# Patient Record
Sex: MALE | Race: WHITE | NOT HISPANIC OR LATINO | Employment: FULL TIME | URBAN - NONMETROPOLITAN AREA
[De-identification: names, ages, dates, MRNs, and addresses within clinical notes are randomized per-mention and may not be internally consistent; named-entity substitution may affect disease eponyms.]

---

## 2020-12-10 DIAGNOSIS — U07.1 COVID-19: ICD-10-CM

## 2020-12-10 PROCEDURE — U0003 INFECTIOUS AGENT DETECTION BY NUCLEIC ACID (DNA OR RNA); SEVERE ACUTE RESPIRATORY SYNDROME CORONAVIRUS 2 (SARS-COV-2) (CORONAVIRUS DISEASE [COVID-19]), AMPLIFIED PROBE TECHNIQUE, MAKING USE OF HIGH THROUGHPUT TECHNOLOGIES AS DESCRIBED BY CMS-2020-01-R: HCPCS | Performed by: INTERNAL MEDICINE

## 2020-12-11 LAB — SARS-COV-2 RNA SPEC QL NAA+PROBE: DETECTED

## 2022-08-18 ENCOUNTER — TELEPHONE (OUTPATIENT)
Dept: NEPHROLOGY | Facility: CLINIC | Age: 62
End: 2022-08-18

## 2022-08-22 ENCOUNTER — TELEPHONE (OUTPATIENT)
Dept: CARDIOLOGY CLINIC | Facility: CLINIC | Age: 62
End: 2022-08-22

## 2022-08-24 ENCOUNTER — APPOINTMENT (EMERGENCY)
Dept: NON INVASIVE DIAGNOSTICS | Facility: HOSPITAL | Age: 62
End: 2022-08-24
Payer: COMMERCIAL

## 2022-08-24 ENCOUNTER — HOSPITAL ENCOUNTER (EMERGENCY)
Facility: HOSPITAL | Age: 62
Discharge: HOME | End: 2022-08-24
Attending: EMERGENCY MEDICINE
Payer: COMMERCIAL

## 2022-08-24 VITALS
HEART RATE: 56 BPM | BODY MASS INDEX: 34.97 KG/M2 | HEIGHT: 70 IN | DIASTOLIC BLOOD PRESSURE: 71 MMHG | TEMPERATURE: 98.1 F | OXYGEN SATURATION: 98 % | RESPIRATION RATE: 20 BRPM | WEIGHT: 244.27 LBS | SYSTOLIC BLOOD PRESSURE: 113 MMHG

## 2022-08-24 DIAGNOSIS — N18.9 CHRONIC RENAL INSUFFICIENCY: Primary | ICD-10-CM

## 2022-08-24 DIAGNOSIS — M79.604 RIGHT LEG PAIN: ICD-10-CM

## 2022-08-24 LAB
ALBUMIN SERPL BCP-MCNC: 3.6 G/DL (ref 3.5–5)
ALP SERPL-CCNC: 59 U/L (ref 46–116)
ALT SERPL W P-5'-P-CCNC: 38 U/L (ref 12–78)
ANION GAP SERPL CALCULATED.3IONS-SCNC: 7 MMOL/L (ref 4–13)
AST SERPL W P-5'-P-CCNC: 26 U/L (ref 5–45)
BASOPHILS # BLD AUTO: 0.02 THOUSANDS/ΜL (ref 0–0.1)
BASOPHILS NFR BLD AUTO: 0 % (ref 0–1)
BILIRUB SERPL-MCNC: 0.48 MG/DL (ref 0.2–1)
BUN SERPL-MCNC: 34 MG/DL (ref 5–25)
CALCIUM SERPL-MCNC: 8.9 MG/DL (ref 8.3–10.1)
CHLORIDE SERPL-SCNC: 106 MMOL/L (ref 96–108)
CO2 SERPL-SCNC: 26 MMOL/L (ref 21–32)
CREAT SERPL-MCNC: 2.41 MG/DL (ref 0.6–1.3)
D DIMER PPP FEU-MCNC: 0.45 UG/ML FEU
EOSINOPHIL # BLD AUTO: 0.31 THOUSAND/ΜL (ref 0–0.61)
EOSINOPHIL NFR BLD AUTO: 5 % (ref 0–6)
ERYTHROCYTE [DISTWIDTH] IN BLOOD BY AUTOMATED COUNT: 12.3 % (ref 11.6–15.1)
GFR SERPL CREATININE-BSD FRML MDRD: 27 ML/MIN/1.73SQ M
GLUCOSE SERPL-MCNC: 129 MG/DL (ref 65–140)
HCT VFR BLD AUTO: 34.1 % (ref 36.5–49.3)
HGB BLD-MCNC: 11.2 G/DL (ref 12–17)
IMM GRANULOCYTES # BLD AUTO: 0.02 THOUSAND/UL (ref 0–0.2)
IMM GRANULOCYTES NFR BLD AUTO: 0 % (ref 0–2)
LYMPHOCYTES # BLD AUTO: 2.38 THOUSANDS/ΜL (ref 0.6–4.47)
LYMPHOCYTES NFR BLD AUTO: 36 % (ref 14–44)
MCH RBC QN AUTO: 33.7 PG (ref 26.8–34.3)
MCHC RBC AUTO-ENTMCNC: 32.8 G/DL (ref 31.4–37.4)
MCV RBC AUTO: 103 FL (ref 82–98)
MONOCYTES # BLD AUTO: 0.55 THOUSAND/ΜL (ref 0.17–1.22)
MONOCYTES NFR BLD AUTO: 8 % (ref 4–12)
NEUTROPHILS # BLD AUTO: 3.43 THOUSANDS/ΜL (ref 1.85–7.62)
NEUTS SEG NFR BLD AUTO: 51 % (ref 43–75)
NRBC BLD AUTO-RTO: 0 /100 WBCS
PLATELET # BLD AUTO: 185 THOUSANDS/UL (ref 149–390)
PMV BLD AUTO: 9.9 FL (ref 8.9–12.7)
POTASSIUM SERPL-SCNC: 4.9 MMOL/L (ref 3.5–5.3)
PROT SERPL-MCNC: 7.6 G/DL (ref 6.4–8.4)
RBC # BLD AUTO: 3.32 MILLION/UL (ref 3.88–5.62)
SODIUM SERPL-SCNC: 139 MMOL/L (ref 135–147)
WBC # BLD AUTO: 6.71 THOUSAND/UL (ref 4.31–10.16)

## 2022-08-24 PROCEDURE — 85379 FIBRIN DEGRADATION QUANT: CPT | Performed by: EMERGENCY MEDICINE

## 2022-08-24 PROCEDURE — 99284 EMERGENCY DEPT VISIT MOD MDM: CPT | Performed by: EMERGENCY MEDICINE

## 2022-08-24 PROCEDURE — 85025 COMPLETE CBC W/AUTO DIFF WBC: CPT | Performed by: EMERGENCY MEDICINE

## 2022-08-24 PROCEDURE — 93971 EXTREMITY STUDY: CPT | Performed by: SURGERY

## 2022-08-24 PROCEDURE — 99284 EMERGENCY DEPT VISIT MOD MDM: CPT

## 2022-08-24 PROCEDURE — 80053 COMPREHEN METABOLIC PANEL: CPT | Performed by: EMERGENCY MEDICINE

## 2022-08-24 PROCEDURE — 36415 COLL VENOUS BLD VENIPUNCTURE: CPT | Performed by: EMERGENCY MEDICINE

## 2022-08-24 PROCEDURE — 93971 EXTREMITY STUDY: CPT

## 2022-08-24 RX ORDER — ALLOPURINOL 100 MG/1
TABLET ORAL
Status: ON HOLD | COMMUNITY

## 2022-08-24 RX ORDER — ASPIRIN 81 MG/1
TABLET ORAL
Status: ON HOLD | COMMUNITY

## 2022-08-24 RX ORDER — LEVOTHYROXINE SODIUM 0.15 MG/1
TABLET ORAL
Status: ON HOLD | COMMUNITY
Start: 2022-06-27

## 2022-08-24 RX ORDER — GLIPIZIDE 5 MG/1
TABLET, FILM COATED, EXTENDED RELEASE ORAL
Status: ON HOLD | COMMUNITY
Start: 2022-03-29

## 2022-08-24 RX ORDER — ATORVASTATIN CALCIUM 20 MG/1
TABLET, FILM COATED ORAL
Status: ON HOLD | COMMUNITY

## 2022-08-24 RX ORDER — LOSARTAN POTASSIUM 50 MG/1
50 TABLET ORAL DAILY
Status: ON HOLD | COMMUNITY

## 2022-08-24 RX ORDER — UBIDECARENONE 100 MG
CAPSULE ORAL
Status: ON HOLD | COMMUNITY

## 2022-08-24 RX ORDER — OMEPRAZOLE 20 MG/1
CAPSULE, DELAYED RELEASE ORAL
Status: ON HOLD | COMMUNITY

## 2022-08-24 RX ORDER — ATENOLOL 50 MG/1
TABLET ORAL
Status: ON HOLD | COMMUNITY
End: 2022-10-30

## 2022-08-24 NOTE — ED PROVIDER NOTES
History  Chief Complaint   Patient presents with    Leg Pain     Saturday started with right leg pain just above the knee radiating into right inner thigh, denies injury, denies SOB     68-year-old male presents emergency room chief complaint of right thigh pain  Patient reports that he started with right upper leg pain on Saturday  Was localized more towards the medial aspect of his right thigh near his knee but has migrated and radiated proximally to his mid thigh  Is tender to palpitation  Is warm to touch  No fevers or chills  Does not feel particularly swollen  No chest pain or shortness of breath  No history of DVT  No risk factors for DVT  Patient did have ankle surgery there many years ago patient has a history of hypertension, hyperlipidemia, and diabetes  Patient does suffer neuropathy  History provided by:  Patient and spouse  Leg Pain  Location:  Leg  Leg location:  R upper leg  Pain details:     Quality:  Aching, pressure and burning    Radiates to:  Does not radiate    Severity:  Moderate    Onset quality:  Gradual    Duration:  4 days    Timing:  Constant    Progression:  Waxing and waning  Chronicity:  New  Relieved by:  None tried  Worsened by:  Bearing weight  Ineffective treatments:  None tried  Associated symptoms: no back pain, no decreased ROM, no fatigue, no fever, no itching, no muscle weakness, no neck pain, no numbness, no stiffness, no swelling and no tingling        Prior to Admission Medications   Prescriptions Last Dose Informant Patient Reported?  Taking?   allopurinol (ZYLOPRIM) 100 mg tablet   Yes No   Sig: Take by mouth   aspirin (Aspir-Low) 81 mg EC tablet   Yes No   Sig: Take by mouth   atenolol (TENORMIN) 50 mg tablet   Yes No   Sig: Take by mouth   atorvastatin (LIPITOR) 20 mg tablet   Yes No   co-enzyme Q-10 100 mg capsule   Yes No   Sig: Take by mouth   glipiZIDE (GLUCOTROL XL) 5 mg 24 hr tablet   Yes Yes   Sig: TAKE 1 TABLET DAILY 30 MINUTES BEFORE A MEAL (REPLACES 2 5 MG)   levothyroxine 150 mcg tablet   Yes Yes   Sig: TAKE 1 TABLET DAILY AT LEAST 30 MINUTES PRIOR TO BREAKFAST OR OTHER MEDICATIONS (REPLACES 137 MCG)   losartan (Cozaar) 100 MG tablet   Yes No   Sig: Take by mouth   omeprazole (PriLOSEC) 20 mg delayed release capsule   Yes No      Facility-Administered Medications: None       Past Medical History:   Diagnosis Date    Chronic kidney failure, stage 2 (mild)     Diabetes mellitus (HCC)     GERD (gastroesophageal reflux disease)     Gout     Hypertension        Past Surgical History:   Procedure Laterality Date    ANKLE FRACTURE SURGERY Right     CHOLECYSTECTOMY         History reviewed  No pertinent family history  I have reviewed and agree with the history as documented  E-Cigarette/Vaping    E-Cigarette Use Never User      E-Cigarette/Vaping Substances     Social History     Tobacco Use    Smoking status: Former Smoker     Types: Cigarettes    Smokeless tobacco: Never Used   Vaping Use    Vaping Use: Never used   Substance Use Topics    Alcohol use: Not Currently    Drug use: Never       Review of Systems   Constitutional: Negative for activity change, fatigue and fever  HENT: Negative for congestion, ear pain, rhinorrhea, sinus pressure and sore throat  Eyes: Negative  Respiratory: Negative  Negative for cough, chest tightness, shortness of breath and wheezing  Cardiovascular: Negative  Negative for chest pain, palpitations and leg swelling  Gastrointestinal: Negative  Negative for abdominal pain, diarrhea, nausea and vomiting  Endocrine: Negative  Genitourinary: Negative  Negative for dysuria, flank pain and frequency  Musculoskeletal: Positive for myalgias  Negative for arthralgias, back pain, gait problem, joint swelling, neck pain, neck stiffness and stiffness  Skin: Negative  Negative for color change, itching, pallor and rash  Allergic/Immunologic: Negative  Neurological: Negative    Negative for dizziness, weakness and headaches  Hematological: Negative  Psychiatric/Behavioral: Negative  All other systems reviewed and are negative  Physical Exam  Physical Exam  Vitals and nursing note reviewed  Constitutional:       General: He is not in acute distress  Appearance: Normal appearance  He is well-developed  He is obese  He is not ill-appearing or toxic-appearing  HENT:      Head: Normocephalic and atraumatic  Hair is normal       Jaw: No pain on movement  Right Ear: External ear normal       Left Ear: External ear normal       Nose: Nose normal       Mouth/Throat:      Mouth: Mucous membranes are moist       Pharynx: Oropharynx is clear  Eyes:      General: Lids are normal  No scleral icterus  Extraocular Movements: Extraocular movements intact  Conjunctiva/sclera: Conjunctivae normal       Pupils: Pupils are equal, round, and reactive to light  Cardiovascular:      Rate and Rhythm: Normal rate and regular rhythm  Heart sounds: Normal heart sounds  No murmur heard  Pulmonary:      Effort: Pulmonary effort is normal  No respiratory distress  Breath sounds: Normal breath sounds  No decreased breath sounds, wheezing, rhonchi or rales  Abdominal:      General: Abdomen is flat  There is no distension  Palpations: Abdomen is soft  Abdomen is not rigid  Tenderness: There is no abdominal tenderness  There is no right CVA tenderness or left CVA tenderness  Musculoskeletal:         General: Tenderness present  No deformity or signs of injury  Normal range of motion  Cervical back: Normal range of motion and neck supple  Thoracic back: Normal       Lumbar back: Normal       Right hip: Normal       Right upper leg: Tenderness present  No swelling, edema, deformity, lacerations or bony tenderness  Right knee: Normal       Right lower leg: Normal    Skin:     General: Skin is warm and dry  Coloration: Skin is not pale        Findings: No rash    Neurological:      General: No focal deficit present  Mental Status: He is alert and oriented to person, place, and time  Mental status is at baseline  Psychiatric:         Attention and Perception: Attention normal          Mood and Affect: Mood normal          Speech: Speech normal          Behavior: Behavior normal          Thought Content: Thought content normal          Judgment: Judgment normal          Vital Signs  ED Triage Vitals [08/24/22 0755]   Temperature Pulse Respirations Blood Pressure SpO2   98 1 °F (36 7 °C) 63 17 154/72 98 %      Temp Source Heart Rate Source Patient Position - Orthostatic VS BP Location FiO2 (%)   Temporal Monitor Lying Right arm --      Pain Score       5           Vitals:    08/24/22 0755 08/24/22 0900 08/24/22 0945 08/24/22 1015   BP: 154/72 122/78 122/76 113/71   Pulse: 63 60 55 56   Patient Position - Orthostatic VS: Lying Lying Lying Lying         Visual Acuity      ED Medications  Medications - No data to display    Diagnostic Studies  Results Reviewed     Procedure Component Value Units Date/Time    D-Dimer [[id number]]  (Normal) Collected: 08/24/22 0816    Lab Status: Final result Specimen: Blood from Arm, Left Updated: 08/24/22 0944     D-Dimer, Quant 0 45 ug/ml FEU     Narrative: In the evaluation for possible pulmonary embolism, in the appropriate (Well's Score of 4 or less) patient, the age adjusted d-dimer cutoff for this patient can be calculated as:    Age x 0 01 (in ug/mL) for Age-adjusted D-dimer exclusion threshold for a patient over 50 years      Comprehensive metabolic panel [[id number]]  (Abnormal) Collected: 08/24/22 0816    Lab Status: Final result Specimen: Blood from Arm, Left Updated: 08/24/22 0840     Sodium 139 mmol/L      Potassium 4 9 mmol/L      Chloride 106 mmol/L      CO2 26 mmol/L      ANION GAP 7 mmol/L      BUN 34 mg/dL      Creatinine 2 41 mg/dL      Glucose 129 mg/dL      Calcium 8 9 mg/dL      AST 26 U/L      ALT 38 U/L Alkaline Phosphatase 59 U/L      Total Protein 7 6 g/dL      Albumin 3 6 g/dL      Total Bilirubin 0 48 mg/dL      eGFR 27 ml/min/1 73sq m     Narrative:      National Kidney Disease Foundation guidelines for Chronic Kidney Disease (CKD):     Stage 1 with normal or high GFR (GFR > 90 mL/min/1 73 square meters)    Stage 2 Mild CKD (GFR = 60-89 mL/min/1 73 square meters)    Stage 3A Moderate CKD (GFR = 45-59 mL/min/1 73 square meters)    Stage 3B Moderate CKD (GFR = 30-44 mL/min/1 73 square meters)    Stage 4 Severe CKD (GFR = 15-29 mL/min/1 73 square meters)    Stage 5 End Stage CKD (GFR <15 mL/min/1 73 square meters)  Note: GFR calculation is accurate only with a steady state creatinine    CBC and differential [[id number]]  (Abnormal) Collected: 08/24/22 0816    Lab Status: Final result Specimen: Blood from Arm, Left Updated: 08/24/22 0821     WBC 6 71 Thousand/uL      RBC 3 32 Million/uL      Hemoglobin 11 2 g/dL      Hematocrit 34 1 %       fL      MCH 33 7 pg      MCHC 32 8 g/dL      RDW 12 3 %      MPV 9 9 fL      Platelets 667 Thousands/uL      nRBC 0 /100 WBCs      Neutrophils Relative 51 %      Immat GRANS % 0 %      Lymphocytes Relative 36 %      Monocytes Relative 8 %      Eosinophils Relative 5 %      Basophils Relative 0 %      Neutrophils Absolute 3 43 Thousands/µL      Immature Grans Absolute 0 02 Thousand/uL      Lymphocytes Absolute 2 38 Thousands/µL      Monocytes Absolute 0 55 Thousand/µL      Eosinophils Absolute 0 31 Thousand/µL      Basophils Absolute 0 02 Thousands/µL                  VAS lower limb venous duplex study, unilateral/limited    (Results Pending)              Procedures  Procedures         ED Course  ED Course as of 08/24/22 1331   Wed Aug 24, 2022   7461 No DVT noted     1019 D-Dimer, Quant: 0 45                               SBIRT 22yo+    Flowsheet Row Most Recent Value   SBIRT (25 yo +)    In order to provide better care to our patients, we are screening all of our patients for alcohol and drug use  Would it be okay to ask you these screening questions? Yes Filed at: 08/24/2022 1873   Initial Alcohol Screen: US AUDIT-C     1  How often do you have a drink containing alcohol? 0 Filed at: 08/24/2022 0802   2  How many drinks containing alcohol do you have on a typical day you are drinking? 0 Filed at: 08/24/2022 0802   3a  Male UNDER 65: How often do you have five or more drinks on one occasion? 0 Filed at: 08/24/2022 0802   3b  FEMALE Any Age, or MALE 65+: How often do you have 4 or more drinks on one occassion? 0 Filed at: 08/24/2022 0802   Audit-C Score 0 Filed at: 08/24/2022 1768   NICOLE: How many times in the past year have you    Used an illegal drug or used a prescription medication for non-medical reasons? Never Filed at: 08/24/2022 0802                    MDM  Number of Diagnoses or Management Options  Chronic renal insufficiency: new and requires workup  Right leg pain: new and requires workup  Diagnosis management comments: No evidence of DVT  No evidence of infection  No clinical evidence arterial compromise  Consider superficial thrombophlebitis  Also consider muscle strain  No further emergent intervention required  Patient is aware of his chronic renal insufficiency         Amount and/or Complexity of Data Reviewed  Clinical lab tests: ordered and reviewed  Tests in the radiology section of CPT®: ordered    Risk of Complications, Morbidity, and/or Mortality  Presenting problems: high  Diagnostic procedures: moderate  Management options: moderate    Patient Progress  Patient progress: improved      Disposition  Final diagnoses:   Right leg pain   Chronic renal insufficiency     Time reflects when diagnosis was documented in both MDM as applicable and the Disposition within this note     Time User Action Codes Description Comment    8/24/2022  8:42 AM Manju Rainey Add [M79 604] Right leg pain     8/24/2022 10:19 AM Alysteve May Modify [N72 708] Right leg pain     8/24/2022 10:19 AM Chandu Ramirez [N18 9] Chronic renal insufficiency       ED Disposition     ED Disposition   Discharge    Condition   Stable    Date/Time   Wed Aug 24, 2022 10:19 AM    Comment   Melva Barba discharge to home/self care  Follow-up Information     Follow up With Specialties Details Why Contact Info    Dimitris De Luna MD Family Medicine In 1 week As needed [address]  [phone]            Discharge Medication List as of 8/24/2022 10:20 AM      CONTINUE these medications which have NOT CHANGED    Details   glipiZIDE (GLUCOTROL XL) 5 mg 24 hr tablet TAKE 1 TABLET DAILY 30 MINUTES BEFORE A MEAL (REPLACES 2 5 MG), Historical Med      levothyroxine 150 mcg tablet TAKE 1 TABLET DAILY AT LEAST 30 MINUTES PRIOR TO BREAKFAST OR OTHER MEDICATIONS (REPLACES 137 MCG), Historical Med      allopurinol (ZYLOPRIM) 100 mg tablet Take by mouth, Historical Med      aspirin (Aspir-Low) 81 mg EC tablet Take by mouth, Historical Med      atenolol (TENORMIN) 50 mg tablet Take by mouth, Historical Med      atorvastatin (LIPITOR) 20 mg tablet Historical Med      co-enzyme Q-10 100 mg capsule Take by mouth, Historical Med      losartan (Cozaar) 100 MG tablet Take by mouth, Historical Med      omeprazole (PriLOSEC) 20 mg delayed release capsule Historical Med             No discharge procedures on file      PDMP Review     None          ED Provider  Electronically Signed by           Suhail Hampton DO  08/24/22 6292

## 2022-08-24 NOTE — DISCHARGE INSTRUCTIONS
Please follow-up with your primary care doctor  Use Tylenol for pain  You also may use warm compresses to the area 3 to 4 times a day for 15 minutes at a time  Return with any worsening  Thank you for choosing the emergency department at [facility]  We appreciated the opportunity and privilege to address your healthcare needs  We remain available to you should you require additional evaluation or assistance  We value your feedback and would appreciate the opportunity to address anything you identified as an opportunity to improve or where we excelled  If there are colleagues who deserve special recognition, please let us know! We hope you are feeling better soon! Please also note that sometimes there are subtle abnormalities in your lab values that you may observe when you access your record online  These are frequently not worrisome and if they are of concern we will have discussed them with you  However, we always encourage that you discuss any concerns you may have or observe on your record with your primary care provider  Please also be aware that voice transcription will occasionally recognize words or grammar differently than what was spoken

## 2022-09-29 ENCOUNTER — HOSPITAL ENCOUNTER (OUTPATIENT)
Dept: RADIOLOGY | Facility: HOSPITAL | Age: 62
End: 2022-09-29
Payer: COMMERCIAL

## 2022-09-29 DIAGNOSIS — E11.69 DIABETIC FOOT ULCER WITH OSTEOMYELITIS (HCC): ICD-10-CM

## 2022-09-29 DIAGNOSIS — L97.411 ULCER OF RIGHT HEEL, LIMITED TO BREAKDOWN OF SKIN (HCC): ICD-10-CM

## 2022-09-29 DIAGNOSIS — M86.9 DIABETIC FOOT ULCER WITH OSTEOMYELITIS (HCC): ICD-10-CM

## 2022-09-29 DIAGNOSIS — L97.509 DIABETIC FOOT ULCER WITH OSTEOMYELITIS (HCC): ICD-10-CM

## 2022-09-29 DIAGNOSIS — E11.621 DIABETIC FOOT ULCER WITH OSTEOMYELITIS (HCC): ICD-10-CM

## 2022-09-29 DIAGNOSIS — N50.811 RIGHT TESTICULAR PAIN: ICD-10-CM

## 2022-09-29 PROCEDURE — 73630 X-RAY EXAM OF FOOT: CPT

## 2022-10-06 ENCOUNTER — HOSPITAL ENCOUNTER (OUTPATIENT)
Dept: ULTRASOUND IMAGING | Facility: HOSPITAL | Age: 62
End: 2022-10-06
Payer: COMMERCIAL

## 2022-10-06 DIAGNOSIS — N50.811 RIGHT TESTICULAR PAIN: ICD-10-CM

## 2022-10-06 PROCEDURE — 76870 US EXAM SCROTUM: CPT

## 2022-10-13 ENCOUNTER — HOSPITAL ENCOUNTER (OUTPATIENT)
Dept: NON INVASIVE DIAGNOSTICS | Facility: HOSPITAL | Age: 62
Discharge: HOME | End: 2022-10-13
Payer: COMMERCIAL

## 2022-10-13 DIAGNOSIS — E08.621 DIABETIC FOOT ULCER ASSOCIATED WITH DIABETES MELLITUS DUE TO UNDERLYING CONDITION, UNSPECIFIED LATERALITY, UNSPECIFIED PART OF FOOT, UNSPECIFIED ULCER STAGE (HCC): ICD-10-CM

## 2022-10-13 DIAGNOSIS — L97.509 DIABETIC FOOT ULCER ASSOCIATED WITH DIABETES MELLITUS DUE TO UNDERLYING CONDITION, UNSPECIFIED LATERALITY, UNSPECIFIED PART OF FOOT, UNSPECIFIED ULCER STAGE (HCC): ICD-10-CM

## 2022-10-13 PROCEDURE — 93923 UPR/LXTR ART STDY 3+ LVLS: CPT

## 2022-10-13 PROCEDURE — 93925 LOWER EXTREMITY STUDY: CPT

## 2022-10-14 PROCEDURE — 93925 LOWER EXTREMITY STUDY: CPT | Performed by: SURGERY

## 2022-10-14 PROCEDURE — 93922 UPR/L XTREMITY ART 2 LEVELS: CPT | Performed by: SURGERY

## 2022-10-29 ENCOUNTER — APPOINTMENT (EMERGENCY)
Dept: RADIOLOGY | Facility: HOSPITAL | Age: 62
End: 2022-10-29

## 2022-10-29 ENCOUNTER — HOSPITAL ENCOUNTER (INPATIENT)
Facility: HOSPITAL | Age: 62
LOS: 5 days | Discharge: HOME | End: 2022-11-03
Attending: STUDENT IN AN ORGANIZED HEALTH CARE EDUCATION/TRAINING PROGRAM | Admitting: FAMILY MEDICINE

## 2022-10-29 DIAGNOSIS — A41.9 SEPSIS (HCC): ICD-10-CM

## 2022-10-29 DIAGNOSIS — E11.621 DIABETIC FOOT ULCER (HCC): Primary | ICD-10-CM

## 2022-10-29 DIAGNOSIS — N18.4 STAGE 4 CHRONIC KIDNEY DISEASE (HCC): ICD-10-CM

## 2022-10-29 DIAGNOSIS — E11.52 TYPE 2 DIABETES MELLITUS WITH DIABETIC PERIPHERAL ANGIOPATHY AND GANGRENE, WITHOUT LONG-TERM CURRENT USE OF INSULIN (HCC): ICD-10-CM

## 2022-10-29 DIAGNOSIS — L97.509 DIABETIC FOOT ULCER (HCC): Primary | ICD-10-CM

## 2022-10-29 DIAGNOSIS — M86.171 OSTEOMYELITIS OF FOOT, RIGHT, ACUTE (HCC): ICD-10-CM

## 2022-10-29 LAB
ALBUMIN SERPL BCP-MCNC: 3.3 G/DL (ref 3.5–5)
ALP SERPL-CCNC: 67 U/L (ref 46–116)
ALT SERPL W P-5'-P-CCNC: 30 U/L (ref 12–78)
ANION GAP SERPL CALCULATED.3IONS-SCNC: 10 MMOL/L (ref 4–13)
AST SERPL W P-5'-P-CCNC: 16 U/L (ref 5–45)
BASOPHILS # BLD AUTO: 0.02 THOUSANDS/ÂΜL (ref 0–0.1)
BASOPHILS NFR BLD AUTO: 0 % (ref 0–1)
BILIRUB SERPL-MCNC: 0.99 MG/DL (ref 0.2–1)
BUN SERPL-MCNC: 28 MG/DL (ref 5–25)
CALCIUM ALBUM COR SERPL-MCNC: 9.1 MG/DL (ref 8.3–10.1)
CALCIUM SERPL-MCNC: 8.5 MG/DL (ref 8.3–10.1)
CHLORIDE SERPL-SCNC: 101 MMOL/L (ref 96–108)
CO2 SERPL-SCNC: 25 MMOL/L (ref 21–32)
CREAT SERPL-MCNC: 2.42 MG/DL (ref 0.6–1.3)
CRP SERPL QL: 120.5 MG/L
EOSINOPHIL # BLD AUTO: 0.05 THOUSAND/ÂΜL (ref 0–0.61)
EOSINOPHIL NFR BLD AUTO: 0 % (ref 0–6)
ERYTHROCYTE [DISTWIDTH] IN BLOOD BY AUTOMATED COUNT: 12.2 % (ref 11.6–15.1)
ERYTHROCYTE [SEDIMENTATION RATE] IN BLOOD: 71 MM/HOUR (ref 0–19)
GFR SERPL CREATININE-BSD FRML MDRD: 27 ML/MIN/1.73SQ M
GLUCOSE SERPL-MCNC: 126 MG/DL (ref 65–140)
GLUCOSE SERPL-MCNC: 151 MG/DL (ref 65–140)
HCT VFR BLD AUTO: 33 % (ref 36.5–49.3)
HGB BLD-MCNC: 11.1 G/DL (ref 12–17)
IMM GRANULOCYTES # BLD AUTO: 0.06 THOUSAND/UL (ref 0–0.2)
IMM GRANULOCYTES NFR BLD AUTO: 0 % (ref 0–2)
LACTATE SERPL-SCNC: 1.2 MMOL/L (ref 0.5–2)
LYMPHOCYTES # BLD AUTO: 1.72 THOUSANDS/ÂΜL (ref 0.6–4.47)
LYMPHOCYTES NFR BLD AUTO: 12 % (ref 14–44)
MCH RBC QN AUTO: 33.6 PG (ref 26.8–34.3)
MCHC RBC AUTO-ENTMCNC: 33.6 G/DL (ref 31.4–37.4)
MCV RBC AUTO: 100 FL (ref 82–98)
MONOCYTES # BLD AUTO: 1.96 THOUSAND/ÂΜL (ref 0.17–1.22)
MONOCYTES NFR BLD AUTO: 13 % (ref 4–12)
NEUTROPHILS # BLD AUTO: 10.89 THOUSANDS/ÂΜL (ref 1.85–7.62)
NEUTS SEG NFR BLD AUTO: 75 % (ref 43–75)
NRBC BLD AUTO-RTO: 0 /100 WBCS
PLATELET # BLD AUTO: 241 THOUSANDS/UL (ref 149–390)
PMV BLD AUTO: 10.2 FL (ref 8.9–12.7)
POTASSIUM SERPL-SCNC: 4.6 MMOL/L (ref 3.5–5.3)
PROCALCITONIN SERPL-MCNC: 0.27 NG/ML
PROT SERPL-MCNC: 8 G/DL (ref 6.4–8.4)
RBC # BLD AUTO: 3.3 MILLION/UL (ref 3.88–5.62)
SODIUM SERPL-SCNC: 136 MMOL/L (ref 135–147)
WBC # BLD AUTO: 14.7 THOUSAND/UL (ref 4.31–10.16)

## 2022-10-29 RX ORDER — CEFEPIME HYDROCHLORIDE 2 G/50ML
2000 INJECTION, SOLUTION INTRAVENOUS ONCE
Status: COMPLETED | OUTPATIENT
Start: 2022-10-29 | End: 2022-10-29

## 2022-10-29 RX ORDER — HYDROCODONE BITARTRATE AND ACETAMINOPHEN 5; 325 MG/1; MG/1
1 TABLET ORAL EVERY 6 HOURS PRN
Status: DISCONTINUED | OUTPATIENT
Start: 2022-10-29 | End: 2022-11-03 | Stop reason: HOSPADM

## 2022-10-29 RX ORDER — ALLOPURINOL 100 MG/1
100 TABLET ORAL DAILY
Status: DISCONTINUED | OUTPATIENT
Start: 2022-10-30 | End: 2022-11-03 | Stop reason: HOSPADM

## 2022-10-29 RX ORDER — ASPIRIN 81 MG/1
81 TABLET ORAL DAILY
Status: DISCONTINUED | OUTPATIENT
Start: 2022-10-30 | End: 2022-11-03 | Stop reason: HOSPADM

## 2022-10-29 RX ORDER — METRONIDAZOLE 500 MG/100ML
500 INJECTION, SOLUTION INTRAVENOUS EVERY 8 HOURS
Status: DISCONTINUED | OUTPATIENT
Start: 2022-10-30 | End: 2022-11-02

## 2022-10-29 RX ORDER — ACETAMINOPHEN 325 MG/1
650 TABLET ORAL ONCE
Status: COMPLETED | OUTPATIENT
Start: 2022-10-29 | End: 2022-10-29

## 2022-10-29 RX ORDER — INSULIN LISPRO 100 [IU]/ML
1-6 INJECTION, SOLUTION INTRAVENOUS; SUBCUTANEOUS
Status: DISCONTINUED | OUTPATIENT
Start: 2022-10-29 | End: 2022-11-03 | Stop reason: HOSPADM

## 2022-10-29 RX ORDER — HEPARIN SODIUM 5000 [USP'U]/ML
5000 INJECTION, SOLUTION INTRAVENOUS; SUBCUTANEOUS EVERY 8 HOURS SCHEDULED
Status: DISCONTINUED | OUTPATIENT
Start: 2022-10-30 | End: 2022-11-01

## 2022-10-29 RX ORDER — INSULIN LISPRO 100 [IU]/ML
1-6 INJECTION, SOLUTION INTRAVENOUS; SUBCUTANEOUS
Status: DISCONTINUED | OUTPATIENT
Start: 2022-10-30 | End: 2022-11-03 | Stop reason: HOSPADM

## 2022-10-29 RX ORDER — PANTOPRAZOLE SODIUM 40 MG/1
40 TABLET, DELAYED RELEASE ORAL
Status: DISCONTINUED | OUTPATIENT
Start: 2022-10-30 | End: 2022-11-03 | Stop reason: HOSPADM

## 2022-10-29 RX ORDER — LEVOTHYROXINE SODIUM 0.15 MG/1
150 TABLET ORAL
Status: DISCONTINUED | OUTPATIENT
Start: 2022-10-30 | End: 2022-11-03 | Stop reason: HOSPADM

## 2022-10-29 RX ORDER — CEFAZOLIN SODIUM 2 G/50ML
2000 SOLUTION INTRAVENOUS EVERY 8 HOURS
Status: DISCONTINUED | OUTPATIENT
Start: 2022-10-30 | End: 2022-11-01

## 2022-10-29 RX ADMIN — ACETAMINOPHEN 650 MG: 325 TABLET ORAL at 20:39

## 2022-10-29 RX ADMIN — SODIUM CHLORIDE 1000 ML: 0.9 INJECTION, SOLUTION INTRAVENOUS at 22:22

## 2022-10-29 RX ADMIN — SODIUM CHLORIDE 1000 ML: 0.9 INJECTION, SOLUTION INTRAVENOUS at 21:27

## 2022-10-29 RX ADMIN — VANCOMYCIN HYDROCHLORIDE 1250 MG: 5 INJECTION, POWDER, LYOPHILIZED, FOR SOLUTION INTRAVENOUS at 22:11

## 2022-10-29 RX ADMIN — SODIUM CHLORIDE 1000 ML: 0.9 INJECTION, SOLUTION INTRAVENOUS at 22:52

## 2022-10-29 RX ADMIN — CEFEPIME HYDROCHLORIDE 2000 MG: 2 INJECTION, SOLUTION INTRAVENOUS at 21:28

## 2022-10-30 PROBLEM — N18.4 STAGE 4 CHRONIC KIDNEY DISEASE (HCC): Status: ACTIVE | Noted: 2022-10-30

## 2022-10-30 PROBLEM — N18.32 STAGE 3B CHRONIC KIDNEY DISEASE (HCC): Status: ACTIVE | Noted: 2022-10-30

## 2022-10-30 PROBLEM — D64.9 CHRONIC ANEMIA: Status: ACTIVE | Noted: 2022-10-30

## 2022-10-30 PROBLEM — M86.172 OSTEOMYELITIS OF FOOT, LEFT, ACUTE (HCC): Status: ACTIVE | Noted: 2022-10-30

## 2022-10-30 PROBLEM — E03.9 ACQUIRED HYPOTHYROIDISM: Status: ACTIVE | Noted: 2022-10-30

## 2022-10-30 PROBLEM — I10 ESSENTIAL HYPERTENSION: Status: ACTIVE | Noted: 2022-10-30

## 2022-10-30 PROBLEM — E11.59 TYPE 2 DIABETES MELLITUS WITH CIRCULATORY DISORDER, WITHOUT LONG-TERM CURRENT USE OF INSULIN (HCC): Status: ACTIVE | Noted: 2022-10-30

## 2022-10-30 PROBLEM — L03.115 CELLULITIS OF RIGHT FOOT: Status: ACTIVE | Noted: 2022-10-30

## 2022-10-30 PROBLEM — A41.9 SEPSIS (HCC): Status: ACTIVE | Noted: 2022-10-30

## 2022-10-30 PROBLEM — M86.171 OSTEOMYELITIS OF FOOT, RIGHT, ACUTE (HCC): Status: ACTIVE | Noted: 2022-10-30

## 2022-10-30 LAB
ANION GAP SERPL CALCULATED.3IONS-SCNC: 8 MMOL/L (ref 4–13)
BACTERIA UR QL AUTO: NORMAL /HPF
BASOPHILS # BLD AUTO: 0.02 THOUSANDS/ÂΜL (ref 0–0.1)
BASOPHILS NFR BLD AUTO: 0 % (ref 0–1)
BILIRUB UR QL STRIP: NEGATIVE
BUN SERPL-MCNC: 27 MG/DL (ref 5–25)
CALCIUM SERPL-MCNC: 8 MG/DL (ref 8.3–10.1)
CHLORIDE SERPL-SCNC: 106 MMOL/L (ref 96–108)
CLARITY UR: CLEAR
CO2 SERPL-SCNC: 25 MMOL/L (ref 21–32)
COLOR UR: YELLOW
CREAT SERPL-MCNC: 2.22 MG/DL (ref 0.6–1.3)
CREAT UR-MCNC: 87.2 MG/DL
CREAT UR-MCNC: 87.2 MG/DL
EOSINOPHIL # BLD AUTO: 0.08 THOUSAND/ÂΜL (ref 0–0.61)
EOSINOPHIL NFR BLD AUTO: 1 % (ref 0–6)
ERYTHROCYTE [DISTWIDTH] IN BLOOD BY AUTOMATED COUNT: 12.3 % (ref 11.6–15.1)
GFR SERPL CREATININE-BSD FRML MDRD: 30 ML/MIN/1.73SQ M
GLUCOSE SERPL-MCNC: 162 MG/DL (ref 65–140)
GLUCOSE SERPL-MCNC: 167 MG/DL (ref 65–140)
GLUCOSE SERPL-MCNC: 172 MG/DL (ref 65–140)
GLUCOSE SERPL-MCNC: 84 MG/DL (ref 65–140)
GLUCOSE SERPL-MCNC: 85 MG/DL (ref 65–140)
GLUCOSE UR STRIP-MCNC: NEGATIVE MG/DL
HCT VFR BLD AUTO: 29.4 % (ref 36.5–49.3)
HGB BLD-MCNC: 9.7 G/DL (ref 12–17)
HGB UR QL STRIP.AUTO: NEGATIVE
IMM GRANULOCYTES # BLD AUTO: 0.08 THOUSAND/UL (ref 0–0.2)
IMM GRANULOCYTES NFR BLD AUTO: 1 % (ref 0–2)
KETONES UR STRIP-MCNC: NEGATIVE MG/DL
LEUKOCYTE ESTERASE UR QL STRIP: NEGATIVE
LYMPHOCYTES # BLD AUTO: 2.08 THOUSANDS/ÂΜL (ref 0.6–4.47)
LYMPHOCYTES NFR BLD AUTO: 17 % (ref 14–44)
MAGNESIUM SERPL-MCNC: 1.6 MG/DL (ref 1.6–2.6)
MCH RBC QN AUTO: 34.4 PG (ref 26.8–34.3)
MCHC RBC AUTO-ENTMCNC: 33 G/DL (ref 31.4–37.4)
MCV RBC AUTO: 104 FL (ref 82–98)
MICROALBUMIN UR-MCNC: 99.9 MG/L (ref 0–20)
MICROALBUMIN/CREAT 24H UR: 115 MG/G CREATININE (ref 0–30)
MONOCYTES # BLD AUTO: 1.52 THOUSAND/ÂΜL (ref 0.17–1.22)
MONOCYTES NFR BLD AUTO: 13 % (ref 4–12)
NEUTROPHILS # BLD AUTO: 8.42 THOUSANDS/ÂΜL (ref 1.85–7.62)
NEUTS SEG NFR BLD AUTO: 68 % (ref 43–75)
NITRITE UR QL STRIP: NEGATIVE
NON-SQ EPI CELLS URNS QL MICRO: NORMAL /HPF
NRBC BLD AUTO-RTO: 0 /100 WBCS
PH UR STRIP.AUTO: 6 [PH]
PHOSPHATE SERPL-MCNC: 3.4 MG/DL (ref 2.3–4.1)
PLATELET # BLD AUTO: 173 THOUSANDS/UL (ref 149–390)
PMV BLD AUTO: 9.9 FL (ref 8.9–12.7)
POTASSIUM SERPL-SCNC: 4.5 MMOL/L (ref 3.5–5.3)
PROT UR STRIP-MCNC: NEGATIVE MG/DL
PROT UR-MCNC: 32 MG/DL
PROT/CREAT UR: 0.37 MG/G{CREAT} (ref 0–0.1)
RBC # BLD AUTO: 2.82 MILLION/UL (ref 3.88–5.62)
RBC #/AREA URNS AUTO: NORMAL /HPF
SODIUM SERPL-SCNC: 139 MMOL/L (ref 135–147)
SP GR UR STRIP.AUTO: 1.01 (ref 1–1.03)
TSH SERPL DL<=0.05 MIU/L-ACNC: 0.62 UIU/ML (ref 0.45–4.5)
UROBILINOGEN UR QL STRIP.AUTO: 0.2 E.U./DL
WBC # BLD AUTO: 12.2 THOUSAND/UL (ref 4.31–10.16)
WBC #/AREA URNS AUTO: NORMAL /HPF

## 2022-10-30 RX ORDER — HYDROMORPHONE HCL/PF 1 MG/ML
0.5 SYRINGE (ML) INJECTION ONCE
Status: COMPLETED | OUTPATIENT
Start: 2022-10-30 | End: 2022-10-30

## 2022-10-30 RX ORDER — METOPROLOL SUCCINATE 100 MG/1
100 TABLET, EXTENDED RELEASE ORAL DAILY
Status: DISCONTINUED | OUTPATIENT
Start: 2022-10-30 | End: 2022-11-03 | Stop reason: HOSPADM

## 2022-10-30 RX ORDER — METOPROLOL SUCCINATE 100 MG/1
100 TABLET, EXTENDED RELEASE ORAL DAILY
COMMUNITY

## 2022-10-30 RX ADMIN — INFLUENZA A VIRUS A/WISCONSIN/588/2019 (H1N1) RECOMBINANT HEMAGGLUTININ ANTIGEN, INFLUENZA A VIRUS A/DARWIN/6/2021 (H3N2) RECOMBINANT HEMAGGLUTININ ANTIGEN, INFLUENZA B VIRUS B/AUSTRIA/1359417/2021 RECOMBINANT HEMAGGLUTININ ANTIGEN, AND INFLUENZA B VIRUS B/PHUKET/3073/2013 RECOMBINANT HEMAGGLUTININ ANTIGEN 0.5 ML: 45; 45; 45; 45 INJECTION INTRAMUSCULAR at 16:38

## 2022-10-30 RX ADMIN — HYDROCODONE BITARTRATE AND ACETAMINOPHEN 1 TABLET: 5; 325 TABLET ORAL at 22:45

## 2022-10-30 RX ADMIN — LEVOTHYROXINE SODIUM 150 MCG: 150 TABLET ORAL at 05:38

## 2022-10-30 RX ADMIN — HYDROCODONE BITARTRATE AND ACETAMINOPHEN 1 TABLET: 5; 325 TABLET ORAL at 00:18

## 2022-10-30 RX ADMIN — METRONIDAZOLE 500 MG: 500 INJECTION, SOLUTION INTRAVENOUS at 00:21

## 2022-10-30 RX ADMIN — HYDROMORPHONE HYDROCHLORIDE 0.5 MG: 1 INJECTION, SOLUTION INTRAMUSCULAR; INTRAVENOUS; SUBCUTANEOUS at 06:13

## 2022-10-30 RX ADMIN — METRONIDAZOLE 500 MG: 500 INJECTION, SOLUTION INTRAVENOUS at 17:42

## 2022-10-30 RX ADMIN — HYDROCODONE BITARTRATE AND ACETAMINOPHEN 1 TABLET: 5; 325 TABLET ORAL at 09:10

## 2022-10-30 RX ADMIN — INSULIN LISPRO 1 UNITS: 100 INJECTION, SOLUTION INTRAVENOUS; SUBCUTANEOUS at 16:42

## 2022-10-30 RX ADMIN — CEFAZOLIN SODIUM 2000 MG: 2 SOLUTION INTRAVENOUS at 05:39

## 2022-10-30 RX ADMIN — CEFAZOLIN SODIUM 2000 MG: 2 SOLUTION INTRAVENOUS at 13:57

## 2022-10-30 RX ADMIN — ASPIRIN 81 MG: 81 TABLET, COATED ORAL at 09:10

## 2022-10-30 RX ADMIN — PANTOPRAZOLE SODIUM 40 MG: 40 TABLET, DELAYED RELEASE ORAL at 05:38

## 2022-10-30 RX ADMIN — HYDROCODONE BITARTRATE AND ACETAMINOPHEN 1 TABLET: 5; 325 TABLET ORAL at 16:38

## 2022-10-30 RX ADMIN — ALLOPURINOL 100 MG: 100 TABLET ORAL at 09:10

## 2022-10-30 RX ADMIN — VANCOMYCIN HYDROCHLORIDE 1750 MG: 1 INJECTION, POWDER, LYOPHILIZED, FOR SOLUTION INTRAVENOUS at 15:13

## 2022-10-30 RX ADMIN — METOPROLOL SUCCINATE 100 MG: 100 TABLET, EXTENDED RELEASE ORAL at 09:10

## 2022-10-30 RX ADMIN — CEFAZOLIN SODIUM 2000 MG: 2 SOLUTION INTRAVENOUS at 20:51

## 2022-10-30 RX ADMIN — INSULIN LISPRO 1 UNITS: 100 INJECTION, SOLUTION INTRAVENOUS; SUBCUTANEOUS at 12:34

## 2022-10-30 RX ADMIN — METRONIDAZOLE 500 MG: 500 INJECTION, SOLUTION INTRAVENOUS at 09:11

## 2022-10-30 RX ADMIN — HEPARIN SODIUM 5000 UNITS: 5000 INJECTION INTRAVENOUS; SUBCUTANEOUS at 20:51

## 2022-10-30 RX ADMIN — HEPARIN SODIUM 5000 UNITS: 5000 INJECTION INTRAVENOUS; SUBCUTANEOUS at 13:57

## 2022-10-30 NOTE — ASSESSMENT & PLAN NOTE
Lab Results   Component Value Date    EGFR 30 10/30/2022    EGFR 27 10/29/2022    EGFR 27 08/24/2022    CREATININE 2 22 (H) 10/30/2022    CREATININE 2 42 (H) 10/29/2022    CREATININE 2 41 (H) 08/24/2022     · Review of care everywhere, patient has CKD with creatinine > 2 since 2020  · Creatinine at baseline on admission- history CKD 4  · Referred to Nephrology by PCP but declined  · Follow nephrology recommends  · Trend creatinine  · Renally dose medications  · PTA losartan on hold  · Check urinalysis

## 2022-10-30 NOTE — UTILIZATION REVIEW
Initial Clinical Review    Admission: Date/Time/Statement:   Admission Orders (From admission, onward)     Ordered        10/29/22 2117  INPATIENT ADMISSION  Once                      Orders Placed This Encounter   Procedures   • INPATIENT ADMISSION     Standing Status:   Standing     Number of Occurrences:   1     Order Specific Question:   Level of Care     Answer:   Med Surg [16]     Order Specific Question:   Estimated length of stay     Answer:   More than 2 Midnights     Order Specific Question:   Certification     Answer:   I certify that inpatient services are medically necessary for this patient for a duration of greater than two midnights  See H&P and MD Progress Notes for additional information about the patient's course of treatment  ED Arrival Information     Expected   -    Arrival   10/29/2022 20:03    Acuity   Urgent            Means of arrival   Walk-In    Escorted by   Spouse    Service   Hospitalist    Admission type   Emergency            Arrival complaint   wound check           Chief Complaint   Patient presents with   • Fever - 9 weeks to 74 years     Pt reports he is receiving treatment for diabetic ulcer on R foot  Pr reports swelling in affected extremity, fever beginning yesterday        Initial Presentation: 57 y o  male to ED from home w/ developing fever, chills , R foot swelling w/ redness and pain on 10/28  In ED found to have suspected OM along w/ significant cellulitis w/ possible abscess of R foot  PMHX DM , CKD , HTN , R ankle ORIF recent right plantar aspect diabetic foot infection   Admitted IP status w/ suspected OM + leukocytosis and elevated inflammatory markers , lactic acid 1 2  Plan for podiatry eval , IV abx , wound cx pending , MRI pending   CKD Cr 2 42 , baseline cont to trend , check ua,losartan on hold, consult nephrology   BC pending   DM SSI and monitor   PE: mm dry , swelling tenderness and deformity , erythema                  Date: 10/30   Day 2: cont to have DC from foot infection   Feels feverish   Cr improved to 2 22 con to trend   Wound cx and MRI pending   Cont IV abx   ED Triage Vitals [10/29/22 2010]   Temperature Pulse Respirations Blood Pressure SpO2   (!) 102 4 °F (39 1 °C) 98 16 138/85 98 %      Temp Source Heart Rate Source Patient Position - Orthostatic VS BP Location FiO2 (%)   Oral -- Lying Right arm --      Pain Score       8          Wt Readings from Last 1 Encounters:   10/29/22 112 kg (247 lb 2 2 oz)     Additional Vital Signs:   10/30/22 0900 -- -- -- -- -- 96 % None (Room air) --   10/30/22 07:38:11 98 8 °F (37 1 °C) 72 16 141/70 94 95 % -- --   10/30/22 05:54:18 98 8 °F (37 1 °C) 75 -- -- -- 99 % -- --   10/29/22 2345 -- -- -- -- -- 95 % None (Room air) --   10/29/22 23:44:56 99 5 °F (37 5 °C) 87 18 127/66 86 95 % -- --   10/29/22 2315 -- 92 18 142/70 100 97 % -- --   10/29/22 2300 -- 91 -- 139/73 97 98 % -- --   10/29/22 2230 -- 93 -- 118/58 84 96 % -- --   10/29/22 2200 -- 91 18 142/69 99 97 % -- --   10/29/22 2130 99 5 °F (37 5 °C) 93 -- 126/60 86 96 %         Pertinent Labs/Diagnostic Test Results:   10/30 MRI   XR foot 2 views RIGHT   Final Result by Lamar Greer MD (10/30 8856)      No sign of osteomyelitis        Workstation performed: [id number]         MRI inpatient order    (Results Pending)     Results from last 7 days   Lab Units 10/30/22  0552 10/29/22  2032   WBC Thousand/uL 12 20* 14 70*   HEMOGLOBIN g/dL 9 7* 11 1*   HEMATOCRIT % 29 4* 33 0*   PLATELETS Thousands/uL 173 241   NEUTROS ABS Thousands/µL 8 42* 10 89*     Results from last 7 days   Lab Units 10/30/22  0552 10/29/22  2032   SODIUM mmol/L 139 136   POTASSIUM mmol/L 4 5 4 6   CHLORIDE mmol/L 106 101   CO2 mmol/L 25 25   ANION GAP mmol/L 8 10   BUN mg/dL 27* 28*   CREATININE mg/dL 2 22* 2 42*   EGFR ml/min/1 73sq m 30 27   CALCIUM mg/dL 8 0* 8 5   MAGNESIUM mg/dL 1 6  --    PHOSPHORUS mg/dL 3 4  --      Results from last 7 days   Lab Units 10/29/22  2032   AST U/L 16   ALT U/L 30   ALK PHOS U/L 67   TOTAL PROTEIN g/dL 8 0   ALBUMIN g/dL 3 3*   TOTAL BILIRUBIN mg/dL 0 99     Results from last 7 days   Lab Units 10/30/22  1112 10/30/22  0732 10/29/22  2343   POC GLUCOSE mg/dl 172* 85 126     Results from last 7 days   Lab Units 10/30/22  0552 10/29/22  2032   GLUCOSE RANDOM mg/dL 84 151*     Results from last 7 days   Lab Units 10/30/22  0552   TSH 3RD GENERATON uIU/mL 0 618     Results from last 7 days   Lab Units 10/29/22  2032   PROCALCITONIN ng/ml 0 27*     Results from last 7 days   Lab Units 10/29/22  2032   LACTIC ACID mmol/L 1 2     Results from last 7 days   Lab Units 10/29/22  2032   CRP mg/L 120 5*   SED RATE mm/hour 71*     Results from last 7 days   Lab Units 10/30/22  0909   CLARITY UA  Clear   COLOR UA  Yellow   SPEC GRAV UA  1 015   PH UA  6 0   GLUCOSE UA mg/dl Negative   KETONES UA mg/dl Negative   BLOOD UA  Negative   PROTEIN UA mg/dl Negative   NITRITE UA  Negative   BILIRUBIN UA  Negative   UROBILINOGEN UA E U /dl 0 2   LEUKOCYTES UA  Negative   WBC UA /hpf None Seen   RBC UA /hpf None Seen   BACTERIA UA /hpf None Seen   EPITHELIAL CELLS WET PREP /hpf None Seen       ED Treatment:   Medication Administration from 10/29/2022 2002 to 10/29/2022 2336       Date/Time Order Dose Route Action     10/29/2022 2039 acetaminophen (TYLENOL) tablet 650 mg 650 mg Oral Given     10/29/2022 2211 vancomycin (VANCOCIN) 1250 mg in sodium chloride 0 9% 250 mL IVPB 1,250 mg Intravenous New Bag     10/29/2022 2128 cefepime (MAXIPIME) IVPB (premix in dextrose) 2,000 mg 50 mL 2,000 mg Intravenous New Bag     10/29/2022 2127 sodium chloride 0 9 % bolus 1,000 mL 1,000 mL Intravenous New Bag     10/29/2022 2222 sodium chloride 0 9 % bolus 1,000 mL 1,000 mL Intravenous New Bag     10/29/2022 2252 sodium chloride 0 9 % bolus 1,000 mL 1,000 mL Intravenous New Bag        Past Medical History:   Diagnosis Date   • Chronic kidney failure, stage 2 (mild)    • Diabetes mellitus ([place] [address] )    • GERD (gastroesophageal reflux disease)    • Gout    • Hypertension      Present on Admission:  • Acquired hypothyroidism  • Stage 4 chronic kidney disease ([place] [address] )  • Essential hypertension  • Chronic anemia  • Type 2 diabetes mellitus with circulatory disorder, without long-term current use of insulin ([facility])      Admitting Diagnosis: Diabetic foot ulcer ([place] [address] ) [V59 784, L97 509]  Fever [R50 9]  Sepsis ([place] [address] ) [A41 9]  Age/Sex: 57 y o  male  Admission Orders:  Scheduled Medications:  allopurinol, 100 mg, Oral, Daily  aspirin, 81 mg, Oral, Daily  cefazolin, 2,000 mg, Intravenous, Q8H  heparin (porcine), 5,000 Units, Subcutaneous, Q8H TRAVIS  insulin lispro, 1-6 Units, Subcutaneous, TID AC  insulin lispro, 1-6 Units, Subcutaneous, HS  levothyroxine, 150 mcg, Oral, Early Morning  metoprolol succinate, 100 mg, Oral, Daily  metroNIDAZOLE, 500 mg, Intravenous, Q8H  pantoprazole, 40 mg, Oral, Early Morning  vancomycin, 20 mg/kg (Adjusted), Intravenous, Q24H  Iv dilaudid 0 5 mg IV x1     Continuous IV Infusions:     PRN Meds:  HYDROcodone-acetaminophen, 1 tablet, Oral, Q6H PRN  influenza vaccine, 0 5 mL, Intramuscular, Prior to discharge      Fingerstick ac and hs     IP CONSULT TO CASE MANAGEMENT  IP CONSULT TO PHARMACY  IP CONSULT TO PODIATRY  IP CONSULT TO NEPHROLOGY    Network Utilization Review Department  ATTENTION: Please call with any questions or concerns to [phone] and carefully listen to the prompts so that you are directed to the right person  All voicemails are confidential   Nadira Bold all requests for admission clinical reviews, approved or denied determinations and any other requests to dedicated fax number below belonging to the campus where the patient is receiving treatment   List of dedicated fax numbers for the Facilities:  [address] [place] [address] DENIALS (Administrative/Medical Necessity) [phone]   [address] (Maternity/NICU/Pediatrics) [phone]     [address] Carleen [place]ne [address] [phone]   [address] [place] [address] Al[place]  [facility] [address] [place] [address] 310 [facility] 134 365 [facility] [phone]

## 2022-10-30 NOTE — PROGRESS NOTES
Vancomycin Assessment    Gianna Rivera is a 57 y o  male who is currently receiving vancomycin 1250mg load in ED (15mg/kg ABW) for skin-soft tissue infection     Relevant clinical data and objective history reviewed:  Creatinine   Date Value Ref Range Status   10/29/2022 2 42 (H) 0 60 - 1 30 mg/dL Final     Comment:     Standardized to IDMS reference method   08/24/2022 2 41 (H) 0 60 - 1 30 mg/dL Final     Comment:     Standardized to IDMS reference method     /66   Pulse 87   Temp 99 5 °F (37 5 °C) (Oral)   Resp 18   Wt 109 kg (240 lb)   SpO2 95%   BMI 34 44 kg/m²   No intake/output data recorded  Lab Results   Component Value Date/Time    BUN 28 (H) 10/29/2022 08:32 PM    WBC 14 70 (H) 10/29/2022 08:32 PM    HGB 11 1 (L) 10/29/2022 08:32 PM    HCT 33 0 (L) 10/29/2022 08:32 PM     (H) 10/29/2022 08:32 PM     10/29/2022 08:32 PM     Temp Readings from Last 3 Encounters:   10/29/22 99 5 °F (37 5 °C) (Oral)   08/24/22 98 1 °F (36 7 °C) (Temporal)     Vancomycin Days of Therapy: 1    Assessment/Plan  The patient is currently on vancomycin utilizing scheduled dosing based on adjusted body weight (due to obesity)  Baseline risks associated with therapy include: dehydration  The patient is currently receiving 1250mg load in ED (15mg/kg ABW) and after clinical evaluation will be changed to 1750mg q24h (20mg/kg ABW), starting 17hr from first dose  [facility] will also follow closely for s/sx of nephrotoxicity, infusion reactions, and appropriateness of therapy  BMP and CBC will be ordered per protocol  Plan for trough as patient approaches steady state, prior to the 4th  dose at approximately 1430 on 11/1/22  Due to infection severity, will target a trough of 15-20 (appropriate for most indications)   Pharmacy will continue to follow the patient’s culture results and clinical progress daily      Nile Patel, Pharmacist

## 2022-10-30 NOTE — ASSESSMENT & PLAN NOTE
· Secondary to cellulitis of foot-suspected osteomyelitis  · Presents with leukocytosis, tachycardia, fever  · Lactic acid 1 2  · Right foot osteomyelitis  · Blood cultures pending  · Empiric antibiotic therapy  · Trend fever curve, leukocytosis

## 2022-10-30 NOTE — PROGRESS NOTES
[address]  Progress Note - Rosemary Fearing 1960, 57 y o  male MRN: [id number]  Unit/Bed#: [facility] Encounter: [id number]  Primary Care Provider: Alison Ruggiero MD   Date and time admitted to hospital: 10/29/2022  8:12 PM    Suspected osteomyelitis of foot, right, acute [facility])  Assessment & Plan  · Infected diabetic plantar ulcer  · Plantar wound present for 1 month and has been followed by [facility] outpatient  · Vascular duplex obtained bilaterally on 10/13/2022 without evidence of significant stenosis  · Completed ABX course of Keflex  · Developed acute pain, fever, chills, redness and swelling of right foot on 10/28  · Presents with significant edema/erythema of right foot, 2nd and 3rd digits with sausage like appearance  · Leukocytosis, elevated inflammatory markers  · X-ray right foot- bony erosion of distal 2nd digit  · Follow-up podiatry recommendation, continue IV antibiotic  · Wound culture pending  · MRI when available for further characterization and evaluation of possible abscess-as per patient, he does have plate and screw and right ankle from previous fracture    * Sepsis ([address] )  Assessment & Plan  · Secondary to cellulitis of foot-suspected osteomyelitis  · Presents with leukocytosis, tachycardia, fever  · Lactic acid 1 2  · Right foot osteomyelitis  · Blood cultures pending  · Empiric antibiotic therapy  · Trend fever curve, leukocytosis    Type 2 diabetes mellitus with circulatory disorder, without long-term current use of insulin [facility])  Assessment & Plan  Lab Results   Component Value Date    HGBA1C 6 5 (H) 08/17/2022       Recent Labs     10/29/22  2343 10/30/22  0732 10/30/22  1112   POCGLU 126 85 172*       Blood Sugar Average: Last 72 hrs:  (P) 931 [id number]     · Glipizide on hold with CKD 4  ·  sliding scale insulin coverage with Accu-Cheks  · Carbohydrate controlled diet  · Hypoglycemia protocol    Chronic anemia  Assessment & Plan  · Chronic anemia  · Hemoglobin 11 1 on admission appears baseline  · Probable secondary to CKD 4  · Monitor closely    Essential hypertension  Assessment & Plan  · Continue PTA metoprolol succinate 100 mg daily  · PTA losartan on hold due to CKD 4  · Trend blood pressures    Stage 4 chronic kidney disease [facility])  Assessment & Plan  Lab Results   Component Value Date    EGFR 30 10/30/2022    EGFR 27 10/29/2022    EGFR 27 08/24/2022    CREATININE 2 22 (H) 10/30/2022    CREATININE 2 42 (H) 10/29/2022    CREATININE 2 41 (H) 08/24/2022     · Review of care everywhere, patient has CKD with creatinine > 2 since 2020  · Creatinine at baseline on admission- history CKD 4  · Referred to Nephrology by PCP but declined  · Follow nephrology recommends  · Trend creatinine  · Renally dose medications  · PTA losartan on hold  · Check urinalysis    Acquired hypothyroidism  Assessment & Plan  · Continue PTA levothyroxine 150 mcg daily          VTE Pharmacologic Prophylaxis: VTE Score: 5 Moderate Risk (Score 3-4) - Pharmacological DVT Prophylaxis Ordered: heparin  Patient Centered Rounds: I performed bedside rounds with nursing staff today  Discussions with Specialists or Other Care Team Provider:  None    Education and Discussions with Family / Patient: Updated  (friend) via phone -left voice message    Time Spent for Care: 15 minutes  More than 50% of total time spent on counseling and coordination of care as described above  Current Length of Stay: 1 day(s)  Current Patient Status: Inpatient   Certification Statement: The patient will continue to require additional inpatient hospital stay due to To monitor above condition  Discharge Plan: Anticipate discharge in 48-72 hrs to To be determined    Code Status: Level 1 - Full Code    Subjective:   Seen and evaluated during the round  Resting comfortably  Feels feverish  Denies any nausea, vomiting    But reports he has this foot infection and recently has department but still having some discharge  Objective:     Vitals:   Temp (24hrs), Av 8 °F (37 7 °C), Min:98 8 °F (37 1 °C), Max:102 4 °F (39 1 °C)    Temp:  [98 8 °F (37 1 °C)-102 4 °F (39 1 °C)] 98 8 °F (37 1 °C)  HR:  [72-98] 72  Resp:  [16-18] 16  BP: (118-142)/(58-85) 141/70  SpO2:  [95 %-99 %] 96 %  Body mass index is 35 46 kg/m²  Input and Output Summary (last 24 hours): Intake/Output Summary (Last 24 hours) at 10/30/2022 1143  Last data filed at 10/30/2022 0910  Gross per 24 hour   Intake 2600 ml   Output 400 ml   Net 2200 ml       Physical Exam:   Physical Exam  Vitals and nursing note reviewed  Constitutional:       Appearance: Normal appearance  He is obese  He is not ill-appearing or diaphoretic  HENT:      Mouth/Throat:      Mouth: Mucous membranes are moist       Pharynx: Oropharynx is clear  No oropharyngeal exudate  Eyes:      General: No scleral icterus  Left eye: No discharge  Extraocular Movements: Extraocular movements intact  Conjunctiva/sclera: Conjunctivae normal       Pupils: Pupils are equal, round, and reactive to light  Neck:      Vascular: No carotid bruit  Cardiovascular:      Rate and Rhythm: Normal rate  Heart sounds: Normal heart sounds  No murmur heard  No friction rub  No gallop  Pulmonary:      Effort: Pulmonary effort is normal  No respiratory distress  Breath sounds: No stridor  No wheezing or rhonchi  Abdominal:      General: Abdomen is flat  Bowel sounds are normal  There is no distension  Palpations: There is no mass  Tenderness: There is no abdominal tenderness  Hernia: No hernia is present  Musculoskeletal:         General: Tenderness (Right foot) present  Cervical back: Normal range of motion  No rigidity or tenderness  Right lower leg: Edema present  Left lower leg: No edema        Comments: Right foot is covered with dry, clean, intact dressing   Lymphadenopathy:      Cervical: No cervical adenopathy  Skin:     General: Skin is warm  Capillary Refill: Capillary refill takes less than 2 seconds  Findings: Erythema present  No lesion  Neurological:      General: No focal deficit present  Mental Status: He is alert and oriented to person, place, and time  Cranial Nerves: No cranial nerve deficit  Sensory: No sensory deficit  Motor: No weakness  Psychiatric:         Mood and Affect: Mood normal          Additional Data:     Labs:  Results from last 7 days   Lab Units 10/30/22  0552   WBC Thousand/uL 12 20*   HEMOGLOBIN g/dL 9 7*   HEMATOCRIT % 29 4*   PLATELETS Thousands/uL 173   NEUTROS PCT % 68   LYMPHS PCT % 17   MONOS PCT % 13*   EOS PCT % 1     Results from last 7 days   Lab Units 10/30/22  0552 10/29/22  2032   SODIUM mmol/L 139 136   POTASSIUM mmol/L 4 5 4 6   CHLORIDE mmol/L 106 101   CO2 mmol/L 25 25   BUN mg/dL 27* 28*   CREATININE mg/dL 2 22* 2 42*   ANION GAP mmol/L 8 10   CALCIUM mg/dL 8 0* 8 5   ALBUMIN g/dL  --  3 3*   TOTAL BILIRUBIN mg/dL  --  0 99   ALK PHOS U/L  --  67   ALT U/L  --  30   AST U/L  --  16   GLUCOSE RANDOM mg/dL 84 151*         Results from last 7 days   Lab Units 10/30/22  1112 10/30/22  0732 10/29/22  2343   POC GLUCOSE mg/dl 172* 85 126         Results from last 7 days   Lab Units 10/29/22  2032   LACTIC ACID mmol/L 1 2   PROCALCITONIN ng/ml 0 27*       Lines/Drains:  Invasive Devices  Report    Peripheral Intravenous Line  Duration           Peripheral IV 08/24/22 Left Antecubital 67 days    Peripheral IV 10/29/22 Left Antecubital 1 day                      Imaging: No pertinent imaging reviewed      Recent Cultures (last 7 days):         Last 24 Hours Medication List:   Current Facility-Administered Medications   Medication Dose Route Frequency Provider Last Rate   • allopurinol  100 mg Oral Daily Yolanda S Sudhir, CRNP     • aspirin  81 mg Oral Daily Yolanda S Sudhir, CRNP     • cefazolin  2,000 mg Intravenous Q8H Yolanda S Sudhir, JUANYNP 2,000 mg (10/30/22 0539)   • heparin (porcine)  5,000 Units Subcutaneous Q8H [facility] Yolanda S Sudhir, CRNP     • HYDROcodone-acetaminophen  1 tablet Oral Q6H PRN Yolanda S Sudhir, CRNP     • influenza vaccine  0 5 mL Intramuscular Prior to discharge Sena Muñiz MD     • insulin lispro  1-6 Units Subcutaneous TID AC Yolanda S Sudhir, CRNP     • insulin lispro  1-6 Units Subcutaneous HS Yolanda S Sudhir, CRNP     • levothyroxine  150 mcg Oral Early Morning Yolanda S Sudhir, CRNP     • metoprolol succinate  100 mg Oral Daily Yolanda S Sudhir, CRNP     • metroNIDAZOLE  500 mg Intravenous Q8H Yolanda S Sudhir, CRNP 500 mg (10/30/22 0911)   • pantoprazole  40 mg Oral Early Morning Yolanda S Sudhir, CRNP     • vancomycin  20 mg/kg (Adjusted) Intravenous Q24H Yolanda S Sudhir, CRNP          Today, Patient Was Seen By: Sena Muñiz    **Please Note: This note may have been constructed using a voice recognition system  **

## 2022-10-30 NOTE — ASSESSMENT & PLAN NOTE
· Infected diabetic plantar ulcer  · Plantar wound present for 1 month and has been followed by [facility] Care outpatient  · Vascular duplex obtained bilaterally on 10/13/2022 without evidence of significant stenosis  · Completed ABX course of Keflex  · Developed acute pain, fever, chills, redness and swelling of right foot on 10/28  · Presents with significant edema/erythema of right foot, 2nd and 3rd digits with sausage like appearance  · Leukocytosis, elevated inflammatory markers  · X-ray right foot- bony erosion of distal 2nd digit  · Follow-up podiatry recommendation, continue IV antibiotic  · Wound culture pending  · MRI when available for further characterization and evaluation of possible abscess-as per patient, he does have plate and screw and right ankle from previous fracture

## 2022-10-30 NOTE — ASSESSMENT & PLAN NOTE
· Continue PTA metoprolol succinate 100 mg daily  · PTA losartan on hold due to CKD 4  · Trend blood pressures

## 2022-10-30 NOTE — PLAN OF CARE
Problem: PAIN - ADULT  Goal: Verbalizes/displays adequate comfort level or baseline comfort level  Description: Interventions:  - Encourage patient to monitor pain and request assistance  - Assess pain using appropriate pain scale  - Administer analgesics based on type and severity of pain and evaluate response  - Implement non-pharmacological measures as appropriate and evaluate response  - Consider cultural and social influences on pain and pain management  - Notify physician/advanced practitioner if interventions unsuccessful or patient reports new pain  Outcome: Progressing     Problem: INFECTION - ADULT  Goal: Absence or prevention of progression during hospitalization  Description: INTERVENTIONS:  - Assess and monitor for signs and symptoms of infection  - Monitor lab/diagnostic results  - Monitor all insertion sites, i e  indwelling lines, tubes, and drains  - Monitor endotracheal if appropriate and nasal secretions for changes in amount and color  - Windber appropriate cooling/warming therapies per order  - Administer medications as ordered  - Instruct and encourage patient and family to use good hand hygiene technique  - Identify and instruct in appropriate isolation precautions for identified infection/condition  Outcome: Progressing  Goal: Absence of fever/infection during neutropenic period  Description: INTERVENTIONS:  - Monitor WBC    Outcome: Progressing     Problem: SAFETY ADULT  Goal: Patient will remain free of falls  Description: INTERVENTIONS:  - Educate patient/family on patient safety including physical limitations  - Instruct patient to call for assistance with activity   - Consult OT/PT to assist with strengthening/mobility   - Keep Call bell within reach  - Keep bed low and locked with side rails adjusted as appropriate  - Keep care items and personal belongings within reach  - Initiate and maintain comfort rounds  - Make Fall Risk Sign visible to staff  - Offer Toileting every 2 Hours, in advance of need    - Apply yellow socks and bracelet for high fall risk patients  - Consider moving patient to room near nurses station  Outcome: Progressing  Goal: Maintain or return to baseline ADL function  Description: INTERVENTIONS:  -  Assess patient's ability to carry out ADLs; assess patient's baseline for ADL function and identify physical deficits which impact ability to perform ADLs (bathing, care of mouth/teeth, toileting, grooming, dressing, etc )  - Assess/evaluate cause of self-care deficits   - Assess range of motion  - Assess patient's mobility; develop plan if impaired  - Assess patient's need for assistive devices and provide as appropriate  - Encourage maximum independence but intervene and supervise when necessary  - Involve family in performance of ADLs  - Assess for home care needs following discharge   - Consider OT consult to assist with ADL evaluation and planning for discharge  - Provide patient education as appropriate  Outcome: Progressing  Goal: Maintains/Returns to pre admission functional level  Description: INTERVENTIONS:  - Perform BMAT or MOVE assessment daily    - Set and communicate daily mobility goal to care team and patient/family/caregiver     - Collaborate with rehabilitation services on mobility goals if consulted  - Out of bed for toileting  - Record patient progress and toleration of activity level   Outcome: Progressing     Problem: DISCHARGE PLANNING  Goal: Discharge to home or other facility with appropriate resources  Description: INTERVENTIONS:  - Identify barriers to discharge w/patient and caregiver  - Arrange for needed discharge resources and transportation as appropriate  - Identify discharge learning needs (meds, wound care, etc )  - Arrange for interpretive services to assist at discharge as needed  - Refer to Case Management Department for coordinating discharge planning if the patient needs post-hospital services based on physician/advanced practitioner order or complex needs related to functional status, cognitive ability, or social support system  Outcome: Progressing     Problem: Knowledge Deficit  Goal: Patient/family/caregiver demonstrates understanding of disease process, treatment plan, medications, and discharge instructions  Description: Complete learning assessment and assess knowledge base    Interventions:  - Provide teaching at level of understanding  - Provide teaching via preferred learning methods  Outcome: Progressing     Problem: METABOLIC, FLUID AND ELECTROLYTES - ADULT  Goal: Electrolytes maintained within normal limits  Description: INTERVENTIONS:  - Monitor labs and assess patient for signs and symptoms of electrolyte imbalances  - Administer electrolyte replacement as ordered  - Monitor response to electrolyte replacements, including repeat lab results as appropriate  - Instruct patient on fluid and nutrition as appropriate  Outcome: Progressing  Goal: Fluid balance maintained  Description: INTERVENTIONS:  - Monitor labs   - Monitor I/O and WT  - Instruct patient on fluid and nutrition as appropriate  - Assess for signs & symptoms of volume excess or deficit  Outcome: Progressing  Goal: Glucose maintained within target range  Description: INTERVENTIONS:  - Monitor Blood Glucose as ordered  - Assess for signs and symptoms of hyperglycemia and hypoglycemia  - Administer ordered medications to maintain glucose within target range  - Assess nutritional intake and initiate nutrition service referral as needed  Outcome: Progressing     Problem: SKIN/TISSUE INTEGRITY - ADULT  Goal: Skin Integrity remains intact(Skin Breakdown Prevention)  Description: Assess:  -Perform [place] assessment every shift  -Clean and moisturize skin every shift and PRN  -Inspect skin when repositioning, toileting, and assisting with ADLS=  -Assess extremities for adequate circulation and sensation         Activity:  -Mobilize patient 3 times a day  -Encourage activity and walks on unit  -Encourage or provide ROM exercises   -Use appropriate equipment to lift or move patient in bed    Skin Care:  -Avoid use of baby powder, tape, friction and shearing, hot water or constrictive clothing  -Do not massage red bony areas    Outcome: Progressing  Goal: Incision(s), wounds(s) or drain site(s) healing without S/S of infection  Description: INTERVENTIONS  - Assess and document dressing, incision, wound bed, drain sites and surrounding tissue  - Provide patient and family education  - Perform skin care every shift and prn    Outcome: Progressing

## 2022-10-30 NOTE — ASSESSMENT & PLAN NOTE
Lab Results   Component Value Date    EGFR 27 10/29/2022    EGFR 27 08/24/2022    CREATININE 2 42 (H) 10/29/2022    CREATININE 2 41 (H) 08/24/2022     · Review of care everywhere, patient has CKD with creatinine > 2 since 2020  · Creatinine at baseline on admission- history CKD 4  · Referred to Nephrology by PCP but declined  · Will ask Nephrology to evaluate during admission due to probable surgical procedure for optimization  · Trend creatinine  · Renally dose medications  · PTA losartan on hold  · Check urinalysis

## 2022-10-30 NOTE — ED PROVIDER NOTES
History  Chief Complaint   Patient presents with   • Fever - 9 weeks to 74 years     Pt reports he is receiving treatment for diabetic ulcer on R foot  Pr reports swelling in affected extremity, fever beginning yesterday      57year old male presents emergency department for evaluation of fever  Patient is diabetic with foot ulcer on the bottom of the right foot and the top of the right 2nd toe  Has been following with Wound Care with recent debridement  Patient reports over the past 2 days he has had fever which has not responded to Tylenol  States that has been more red and swollen than previously  Reports he does have neuropathy in the foot however does have pain due to wound  History provided by:  Patient  Medical Problem  Location:  Right foot  Quality:  Wound  Severity:  Severe  Onset quality:  Gradual  Timing:  Constant  Progression:  Worsening  Chronicity:  New  Context:  Diabetic foot ulcer  Ineffective treatments:  Wound care, debridement  Associated symptoms: fever    Associated symptoms: no fatigue        Prior to Admission Medications   Prescriptions Last Dose Informant Patient Reported?  Taking?   allopurinol (ZYLOPRIM) 100 mg tablet   Yes No   Sig: Take by mouth   aspirin (Aspir-Low) 81 mg EC tablet   Yes No   Sig: Take by mouth   atenolol (TENORMIN) 50 mg tablet   Yes No   Sig: Take by mouth   atorvastatin (LIPITOR) 20 mg tablet   Yes No   co-enzyme Q-10 100 mg capsule   Yes No   Sig: Take by mouth   glipiZIDE (GLUCOTROL XL) 5 mg 24 hr tablet   Yes No   Sig: TAKE 1 TABLET DAILY 30 MINUTES BEFORE A MEAL (REPLACES 2 5 MG)   levothyroxine 150 mcg tablet   Yes No   Sig: TAKE 1 TABLET DAILY AT LEAST 30 MINUTES PRIOR TO BREAKFAST OR OTHER MEDICATIONS (REPLACES 137 MCG)   losartan (Cozaar) 100 MG tablet   Yes No   Sig: Take by mouth   omeprazole (PriLOSEC) 20 mg delayed release capsule   Yes No      Facility-Administered Medications: None       Past Medical History:   Diagnosis Date   • Chronic kidney failure, stage 2 (mild)    • Diabetes mellitus (HCC)    • GERD (gastroesophageal reflux disease)    • Gout    • Hypertension        Past Surgical History:   Procedure Laterality Date   • ANKLE FRACTURE SURGERY Right    • CHOLECYSTECTOMY         History reviewed  No pertinent family history  I have reviewed and agree with the history as documented  E-Cigarette/Vaping   • E-Cigarette Use Never User      E-Cigarette/Vaping Substances     Social History     Tobacco Use   • Smoking status: Former Smoker     Types: Cigarettes   • Smokeless tobacco: Never Used   Vaping Use   • Vaping Use: Never used   Substance Use Topics   • Alcohol use: Not Currently   • Drug use: Never       Review of Systems   Constitutional: Positive for fever  Negative for appetite change, chills, diaphoresis and fatigue  HENT: Negative  Respiratory: Negative  Cardiovascular: Negative  Musculoskeletal:        Right foot swelling, pain   Skin: Positive for color change and wound  All other systems reviewed and are negative  Physical Exam  Physical Exam  Vitals and nursing note reviewed  Constitutional:       General: He is not in acute distress  Appearance: Normal appearance  He is normal weight  He is not ill-appearing, toxic-appearing or diaphoretic  HENT:      Head: Normocephalic and atraumatic  Eyes:      Conjunctiva/sclera: Conjunctivae normal       Pupils: Pupils are equal, round, and reactive to light  Cardiovascular:      Rate and Rhythm: Normal rate and regular rhythm  Pulmonary:      Effort: Pulmonary effort is normal       Breath sounds: Normal breath sounds  Musculoskeletal:         General: Swelling and tenderness present  Normal range of motion  Comments: Patient with swelling and tenderness of the right foot  Please see skin exam   Skin:     General: Skin is warm and dry  Capillary Refill: Capillary refill takes less than 2 seconds  Findings: Erythema present        Comments: Patient with foot ulcer on the plantar aspect of the right foot as well as ulcer on distal aspect of the 2nd right toe  Please see photos below   Neurological:      General: No focal deficit present  Mental Status: He is alert and oriented to person, place, and time     Psychiatric:         Mood and Affect: Mood normal          Behavior: Behavior normal                  Vital Signs  ED Triage Vitals [10/29/22 2010]   Temperature Pulse Respirations Blood Pressure SpO2   (!) 102 4 °F (39 1 °C) 98 16 138/85 98 %      Temp Source Heart Rate Source Patient Position - Orthostatic VS BP Location FiO2 (%)   Oral -- Lying Right arm --      Pain Score       8           Vitals:    10/29/22 2010 10/29/22 2130   BP: 138/85 126/60   Pulse: 98 93   Patient Position - Orthostatic VS: Lying          Visual Acuity      ED Medications  Medications   vancomycin (VANCOCIN) 1250 mg in sodium chloride 0 9% 250 mL IVPB (has no administration in time range)   cefepime (MAXIPIME) IVPB (premix in dextrose) 2,000 mg 50 mL (2,000 mg Intravenous New Bag 10/29/22 2128)   sodium chloride 0 9 % bolus 1,000 mL (1,000 mL Intravenous New Bag 10/29/22 2127)     Followed by   sodium chloride 0 9 % bolus 1,000 mL (has no administration in time range)     Followed by   sodium chloride 0 9 % bolus 1,000 mL (has no administration in time range)   acetaminophen (TYLENOL) tablet 650 mg (650 mg Oral Given 10/29/22 2039)       Diagnostic Studies  Results Reviewed     Procedure Component Value Units Date/Time    Procalcitonin [[id number]]  (Abnormal) Collected: 10/29/22 2032    Lab Status: Final result Specimen: Blood from Arm, Left Updated: 10/29/22 2133     Procalcitonin 0 27 ng/ml     Comprehensive metabolic panel [[id number]]  (Abnormal) Collected: 10/29/22 2032    Lab Status: Final result Specimen: Blood from Arm, Left Updated: 10/29/22 2109     Sodium 136 mmol/L      Potassium 4 6 mmol/L      Chloride 101 mmol/L      CO2 25 mmol/L      ANION GAP 10 mmol/L      BUN 28 mg/dL      Creatinine 2 42 mg/dL      Glucose 151 mg/dL      Calcium 8 5 mg/dL      Corrected Calcium 9 1 mg/dL      AST 16 U/L      ALT 30 U/L      Alkaline Phosphatase 67 U/L      Total Protein 8 0 g/dL      Albumin 3 3 g/dL      Total Bilirubin 0 99 mg/dL      eGFR 27 ml/min/1 73sq m     Narrative:      [place] guidelines for Chronic Kidney Disease (CKD):   •  Stage 1 with normal or high GFR (GFR > 90 mL/min/1 73 square meters)  •  Stage 2 Mild CKD (GFR = 60-89 mL/min/1 73 square meters)  •  Stage 3A Moderate CKD (GFR = 45-59 mL/min/1 73 square meters)  •  Stage 3B Moderate CKD (GFR = 30-44 mL/min/1 73 square meters)  •  Stage 4 Severe CKD (GFR = 15-29 mL/min/1 73 square meters)  •  Stage 5 End Stage CKD (GFR <15 mL/min/1 73 square meters)  Note: GFR calculation is accurate only with a steady state creatinine    C-reactive protein [[id number]]  (Abnormal) Collected: 10/29/22 2032    Lab Status: Final result Specimen: Blood from Arm, Left Updated: 10/29/22 2109      5 mg/L     Lactic acid [[id number]]  (Normal) Collected: 10/29/22 2032    Lab Status: Final result Specimen: Blood from Arm, Left Updated: 10/29/22 2103     LACTIC ACID 1 2 mmol/L     Narrative:      Result may be elevated if tourniquet was used during collection      Sedimentation rate, automated [[id number]]  (Abnormal) Collected: 10/29/22 2032    Lab Status: Final result Specimen: Blood from Arm, Left Updated: 10/29/22 2050     Sed Rate 71 mm/hour     CBC and differential [[id number]]  (Abnormal) Collected: 10/29/22 2032    Lab Status: Final result Specimen: Blood from Arm, Left Updated: 10/29/22 2043     WBC 14 70 Thousand/uL      RBC 3 30 Million/uL      Hemoglobin 11 1 g/dL      Hematocrit 33 0 %       fL      MCH 33 6 pg      MCHC 33 6 g/dL      RDW 12 2 %      MPV 10 2 fL      Platelets 333 Thousands/uL      nRBC 0 /100 WBCs      Neutrophils Relative 75 %      Immat GRANS % 0 % Lymphocytes Relative 12 %      Monocytes Relative 13 %      Eosinophils Relative 0 %      Basophils Relative 0 %      Neutrophils Absolute 10 89 Thousands/µL      Immature Grans Absolute 0 06 Thousand/uL      Lymphocytes Absolute 1 72 Thousands/µL      Monocytes Absolute 1 96 Thousand/µL      Eosinophils Absolute 0 05 Thousand/µL      Basophils Absolute 0 02 Thousands/µL     Wound culture and Gram stain [[id number]] Collected: 10/29/22 2032    Lab Status: In process Specimen: Wound from Foot, Right Updated: 10/29/22 2040    Blood culture #1 [[id number]] Collected: 10/29/22 2032    Lab Status: In process Specimen: Blood from Hand, Left Updated: 10/29/22 2039    Blood culture #2 [[id number]] Collected: 10/29/22 2032    Lab Status: In process Specimen: Blood from Arm, Left Updated: 10/29/22 2039                 XR foot 2 views RIGHT    (Results Pending)              Procedures  Procedures         ED Course  ED Course as of 10/29/22 2138   Sat Oct 29, 2022   2012 Temperature(!): 102 4 °F (39 1 °C)   2053 WBC(!): 14 70   2111 C-REACTIVE PROTEIN(!): 120 5   2111 Creatinine(!): 2 42  baseline   2114 TT sent to medicine   2114 Accepted by Sudhir under Dr Mary Jo Payne on medicine service               MDM  Number of Diagnoses or Management Options  Diabetic foot ulcer [facility]): new and requires workup  Sepsis [facility]): new and requires workup  Diagnosis management comments: 57year old diabetic male presents to the emergency department for evaluation of right foot wound  Vitals and medical record reviewed  Patient with sepsis, source of the right foot wound  Please see photos of wound above  Interpretation of x-ray was questionable for possible osteomyelitis of the tip of the 2nd right toe  Patient was treated with fluids, antibiotics, Tylenol    Discussed with medicine who accepted the patient for admission       Amount and/or Complexity of Data Reviewed  Clinical lab tests: ordered and reviewed  Tests in the radiology section of CPT®: ordered and reviewed  Review and summarize past medical records: yes  Independent visualization of images, tracings, or specimens: yes        Disposition  Final diagnoses:   Diabetic foot ulcer ([facility] [address] )   Sepsis [facility] )     Time reflects when diagnosis was documented in both MDM as applicable and the Disposition within this note     Time User Action Codes Description Comment    10/29/2022  9:16 PM Radha Page [[id number],  [id number]] Diabetic foot ulcer ([facility] )     10/29/2022  9:16 PM Radha Page [[id number]] Sepsis [facility])       ED Disposition     ED Disposition   Admit    Condition   Stable    Date/Time   Sat Oct 29, 2022  9:16 PM    Comment   Case was discussed with Sudhir and the patient's admission status was agreed to be Admission Status: inpatient status to the service of Dr Briseyda Gaspar             Follow-up Information    None         Patient's Medications   Discharge Prescriptions    No medications on file       No discharge procedures on file      PDMP Review     None          ED Provider  Electronically Signed by           Lucille Alvarez PA-C  10/29/22 2649

## 2022-10-30 NOTE — PLAN OF CARE
Problem: Potential for Falls  Goal: Patient will remain free of falls  Description: INTERVENTIONS:  - Educate patient/family on patient safety including physical limitations  - Instruct patient to call for assistance with activity   - Consult OT/PT to assist with strengthening/mobility   - Keep Call bell within reach  - Keep bed low and locked with side rails adjusted as appropriate  - Keep care items and personal belongings within reach  - Initiate and maintain comfort rounds  - Make Fall Risk Sign visible to staff  - Offer Toileting every 2 Hours, in advance of need  - Initiate/Maintain alarm  - Obtain necessary fall risk management equipment  - Apply yellow socks and bracelet for high fall risk patients  - Consider moving patient to room near nurses station  Outcome: Progressing     Problem: PAIN - ADULT  Goal: Verbalizes/displays adequate comfort level or baseline comfort level  Description: Interventions:  - Encourage patient to monitor pain and request assistance  - Assess pain using appropriate pain scale  - Administer analgesics based on type and severity of pain and evaluate response  - Implement non-pharmacological measures as appropriate and evaluate response  - Consider cultural and social influences on pain and pain management  - Notify physician/advanced practitioner if interventions unsuccessful or patient reports new pain  Outcome: Progressing     Problem: INFECTION - ADULT  Goal: Absence or prevention of progression during hospitalization  Description: INTERVENTIONS:  - Assess and monitor for signs and symptoms of infection  - Monitor lab/diagnostic results  - Monitor all insertion sites, i e  indwelling lines, tubes, and drains  - Monitor endotracheal if appropriate and nasal secretions for changes in amount and color  - [place] appropriate cooling/warming therapies per order  - Administer medications as ordered  - Instruct and encourage patient and family to use good hand hygiene technique  - Identify and instruct in appropriate isolation precautions for identified infection/condition  Outcome: Progressing

## 2022-10-30 NOTE — ASSESSMENT & PLAN NOTE
· Infected diabetic plantar ulcer  · Plantar wound present for 1 month and has been followed by [facility] outpatient  · Vascular duplex obtained bilaterally on 10/13/2022 without evidence of significant stenosis  · Completed ABX course of Keflex  · Developed acute pain, fever, chills, redness and swelling of right foot on 10/28  · Presents with significant edema/erythema of right foot, 2nd and 3rd digits with sausage like appearance  · Leukocytosis, elevated inflammatory markers  · X-ray right foot- bony erosion of distal 2nd digit  · Appreciate podiatry evaluation  · Empiric IV antibiotic therapy  · Wound culture pending  · MRI when available for further characterization and evaluation of possible abscess

## 2022-10-30 NOTE — ASSESSMENT & PLAN NOTE
· Presents with leukocytosis, tachycardia, fever  · Lactic acid 1 2  · Right foot osteomyelitis  · Blood cultures pending  · Empiric antibiotic therapy  · Trend fever curve, leukocytosis

## 2022-10-30 NOTE — ASSESSMENT & PLAN NOTE
Lab Results   Component Value Date    HGBA1C 6 5 (H) 08/17/2022       Recent Labs     10/29/22  2343 10/30/22  0732 10/30/22  1112   POCGLU 126 85 172*       Blood Sugar Average: Last 72 hrs:  (P) 035 [id number]     · Glipizide on hold with CKD 4  ·  sliding scale insulin coverage with Accu-Cheks  · Carbohydrate controlled diet  · Hypoglycemia protocol

## 2022-10-30 NOTE — H&P
[address] [facility] 1960, 57 y o  male MRN: [id number]  Unit/Bed#: -01 Encounter: [id number]  Primary Care Provider: Juliana Mc MD   Date and time admitted to hospital: 10/29/2022  8:12 PM    * Suspected osteomyelitis of foot, right, acute [facility])  Assessment & Plan  · Infected diabetic plantar ulcer  · Plantar wound present for 1 month and has been followed by [facility] outpatient  · Vascular duplex obtained bilaterally on 10/13/2022 without evidence of significant stenosis  · Completed ABX course of Keflex  · Developed acute pain, fever, chills, redness and swelling of right foot on 10/28  · Presents with significant edema/erythema of right foot, 2nd and 3rd digits with sausage like appearance  · Leukocytosis, elevated inflammatory markers  · X-ray right foot- bony erosion of distal 2nd digit  · Appreciate podiatry evaluation  · Empiric IV antibiotic therapy  · Wound culture pending  · MRI when available for further characterization and evaluation of possible abscess    Stage 4 chronic kidney disease ([facility] [address] )  Assessment & Plan  Lab Results   Component Value Date    EGFR 27 10/29/2022    EGFR 27 08/24/2022    CREATININE 2 42 (H) 10/29/2022    CREATININE 2 41 (H) 08/24/2022     · Review of care everywhere, patient has CKD with creatinine > 2 since 2020  · Creatinine at baseline on admission- history CKD 4  · Referred to Nephrology by PCP but declined  · Will ask Nephrology to evaluate during admission due to probable surgical procedure for optimization  · Trend creatinine  · Renally dose medications  · PTA losartan on hold  · Check urinalysis    Sepsis ([facility] [address] )  Assessment & Plan  · Presents with leukocytosis, tachycardia, fever  · Lactic acid 1 2  · Right foot osteomyelitis  · Blood cultures pending  · Empiric antibiotic therapy  · Trend fever curve, leukocytosis    Type 2 diabetes mellitus with circulatory disorder, without long-term current use of insulin [facility])  Assessment & Plan  Lab Results   Component Value Date    HGBA1C 6 5 (H) 08/17/2022       Recent Labs     10/29/22  2343   POCGLU 126       Blood Sugar Average: Last 72 hrs:  (P) 126     · Glipizide on hold with CKD 4  · Will utilize sliding scale insulin coverage with Accu-Cheks  · Carbohydrate controlled diet  · Hypoglycemia protocol    Chronic anemia  Assessment & Plan  · Chronic anemia  · Hemoglobin 11 1 on admission appears baseline  · Probable secondary to CKD 4  · Monitor closely    Essential hypertension  Assessment & Plan  · Continue PTA metoprolol succinate 100 mg daily  · PTA losartan on hold due to CKD 4  · Trend blood pressures    Acquired hypothyroidism  Assessment & Plan  · Continue PTA levothyroxine 150 mcg daily    VTE Pharmacologic Prophylaxis: VTE Score: 5 High Risk (Score >/= 5) - Pharmacological DVT Prophylaxis Ordered: heparin  Sequential Compression Devices Ordered  Code Status: Level 1 - Full Code   Discussion with family: Patient declined call to   Anticipated Length of Stay: Patient will be admitted on an inpatient basis with an anticipated length of stay of greater than 2 midnights secondary to Sepsis, osteomyelitis suspected  Total Time for Visit, including Counseling / Coordination of Care: 30 minutes Greater than 50% of this total time spent on direct patient counseling and coordination of care  Chief Complaint:  Fevers chills right foot infection    History of Present Illness:  Almas Vizcarra is a 57 y o  male with a PMH of diabetes mellitus, CKD, hypertension, obesity, right ankle ORIF, recent right plantar aspect diabetic foot infection that is followed by outpatient podiatry and wound care  Patient has been evaluated with arterial duplex showing no significant stenosis and completed oral course of antibiotic therapy  He presents to the ED after acutely developing fever, chills, right foot swelling with redness and pain on 10/28    In the ED found to have suspected osteomyelitis along with significant cellulitis with possible abscess of right foot  Presented to Medical Service for further evaluation  Review of Systems:  Review of Systems   Constitutional: Positive for chills and fever  HENT: Negative for ear pain and sore throat  Eyes: Negative for pain and visual disturbance  Respiratory: Negative for cough and shortness of breath  Cardiovascular: Negative for chest pain and palpitations  Gastrointestinal: Negative for abdominal pain and vomiting  Genitourinary: Negative for dysuria and hematuria  Musculoskeletal: Positive for joint swelling  Negative for arthralgias and back pain  Skin: Positive for rash and wound  Negative for color change  Neurological: Negative for seizures and syncope  All other systems reviewed and are negative  Past Medical and Surgical History:   Past Medical History:   Diagnosis Date   • Chronic kidney failure, stage 2 (mild)    • Diabetes mellitus (HCC)    • GERD (gastroesophageal reflux disease)    • Gout    • Hypertension        Past Surgical History:   Procedure Laterality Date   • ANKLE FRACTURE SURGERY Right    • CHOLECYSTECTOMY         Meds/Allergies:  Prior to Admission medications    Medication Sig Start Date End Date Taking?  Authorizing Provider   allopurinol (ZYLOPRIM) 100 mg tablet Take by mouth   Yes Historical Provider, MD   aspirin (ECOTRIN LOW STRENGTH) 81 mg EC tablet Take by mouth   Yes Historical Provider, MD   atorvastatin (LIPITOR) 20 mg tablet    Yes Historical Provider, MD   co-enzyme Q-10 100 mg capsule Take by mouth   Yes Historical Provider, MD   glipiZIDE (GLUCOTROL XL) 5 mg 24 hr tablet TAKE 1 TABLET DAILY 30 MINUTES BEFORE A MEAL (REPLACES 2 5 MG) 3/29/22  Yes Historical Provider, MD   levothyroxine 150 mcg tablet TAKE 1 TABLET DAILY AT LEAST 30 MINUTES PRIOR TO BREAKFAST OR OTHER MEDICATIONS (REPLACES 137 MCG) 6/27/22  Yes Historical Provider, MD   losartan (COZAAR) 50 mg tablet Take 50 mg by mouth daily   Yes Historical Provider, MD   metoprolol succinate (TOPROL-XL) 100 mg 24 hr tablet Take 100 mg by mouth daily   Yes Historical Provider, MD   omeprazole (PriLOSEC) 20 mg delayed release capsule    Yes Historical Provider, MD   atenolol (TENORMIN) 50 mg tablet Take by mouth  10/30/22 Yes Historical Provider, MD     I have reviewed home medications with patient personally  Allergies: No Known Allergies    Social History:  Marital Status: Single   Patient Pre-hospital Living Situation: Home  Patient Pre-hospital Level of Mobility: walks  Patient Pre-hospital Diet Restrictions:  None  Substance Use History:   Social History     Substance and Sexual Activity   Alcohol Use Not Currently     Social History     Tobacco Use   Smoking Status Former Smoker   • Packs/day: 1 00   • Types: Cigarettes   Smokeless Tobacco Never Used     Social History     Substance and Sexual Activity   Drug Use Never       Family History:  History reviewed  No pertinent family history  Physical Exam:     Vitals:   Blood Pressure: 127/66 (10/29/22 2344)  Pulse: 87 (10/29/22 2344)  Temperature: 99 5 °F (37 5 °C) (10/29/22 2344)  Temp Source: Oral (10/29/22 2130)  Respirations: 18 (10/29/22 2344)  Weight - Scale: 112 kg (247 lb 2 2 oz) (10/29/22 2344)  SpO2: 95 % (10/29/22 2344)    Physical Exam  Vitals and nursing note reviewed  Constitutional:       Appearance: He is well-developed  He is obese  He is ill-appearing  HENT:      Head: Normocephalic and atraumatic  Mouth/Throat:      Mouth: Mucous membranes are dry  Eyes:      Conjunctiva/sclera: Conjunctivae normal    Cardiovascular:      Rate and Rhythm: Regular rhythm  Tachycardia present  Heart sounds: No murmur heard  Pulmonary:      Effort: Pulmonary effort is normal  No respiratory distress  Breath sounds: Normal breath sounds  Abdominal:      Palpations: Abdomen is soft  Tenderness: There is no abdominal tenderness  Musculoskeletal:         General: Swelling, tenderness and deformity present  Cervical back: Neck supple  Comments: See photos in media   Skin:     General: Skin is warm and dry  Capillary Refill: Capillary refill takes less than 2 seconds  Findings: Erythema present  Neurological:      General: No focal deficit present  Mental Status: He is alert and oriented to person, place, and time  Psychiatric:         Mood and Affect: Mood normal          Behavior: Behavior normal                   Additional Data:     Lab Results:  Results from last 7 days   Lab Units 10/29/22  2032   WBC Thousand/uL 14 70*   HEMOGLOBIN g/dL 11 1*   HEMATOCRIT % 33 0*   PLATELETS Thousands/uL 241   NEUTROS PCT % 75   LYMPHS PCT % 12*   MONOS PCT % 13*   EOS PCT % 0     Results from last 7 days   Lab Units 10/29/22  2032   SODIUM mmol/L 136   POTASSIUM mmol/L 4 6   CHLORIDE mmol/L 101   CO2 mmol/L 25   BUN mg/dL 28*   CREATININE mg/dL 2 42*   ANION GAP mmol/L 10   CALCIUM mg/dL 8 5   ALBUMIN g/dL 3 3*   TOTAL BILIRUBIN mg/dL 0 99   ALK PHOS U/L 67   ALT U/L 30   AST U/L 16   GLUCOSE RANDOM mg/dL 151*         Results from last 7 days   Lab Units 10/29/22  2343   POC GLUCOSE mg/dl 126         Results from last 7 days   Lab Units 10/29/22  2032   LACTIC ACID mmol/L 1 2   PROCALCITONIN ng/ml 0 27*       Imaging: Reviewed radiology reports from this admission including: xray(s)  XR foot 2 views RIGHT    (Results Pending)   MRI inpatient order    (Results Pending)   Right foot osseous erosions of 2nd digit distal aspect    EKG and Other Studies Reviewed on Admission:   · EKG:  Pending    ** Please Note: This note has been constructed using a voice recognition system   **

## 2022-10-30 NOTE — ASSESSMENT & PLAN NOTE
· Chronic anemia  · Hemoglobin 11 1 on admission appears baseline  · Probable secondary to CKD 4  · Monitor closely

## 2022-10-30 NOTE — CONSULTS
Sergio Almonte 57 y o  male MRN: [id number]  Unit/Bed#: -01 Encounter: [id number]        Assessment and Plan:    1  CKD3b/4 Cr 2 2-2 4 at baseline  CR 2 22 today with egfr 30 ml/min  Likely etiology DM nephropathy  Reviewed CKD stages, Cr and eGFR trends  Quantify proteinuria and check renal US for baseline  Can check PTH as op  He is optimized from a renal perspective for surgery if needed  Hold ACE/ARB 24 hr pre surgery and day of surgery if able  2  Essential HTN under reasonable control on ACE  3  Type 2 D without insulin use   Goal A1C of 7 in CKD to avoid hypoglycemia  A1C 6 5 in August 4  Anemia macrocytic 2/2 renal disease vs MARTHA vs nutritional deificency  Would avoid IV iron with ongoing infection even if deficient  5  Suspected osteomyelitis of foot right acute,   Dose abx with help of clinical pharmacy for Cr Cl < 30 ml/min  Follow vanco by level  Podiatry eval     HPI:    Angel Paz is a 57 y o  male with hx CKD presented to ED with concern for fever, chills, right foot swelling on 10/28  He is being evaluated for suspected right foot osteomyelitis  He is followed by podiatry and wound care as an outpatient for right plantar diabetic foot infection  He had arterial duplex without significant stenosis  Completed abx as op  Denies following with nephrology as op  Stated he has appt 11/15 in [facility] for CKD eval   Denies hematuria,protienuria,nephrolithiasis,pyelo  Denies NSAID use  He has very foamy urine  Cr 2 4 in August  Cr 2 4 on 10/29 and 2 22 today  Cr at baseline with eGFR 30ML/MIN  No recent renal imaging  Ua done on 10/30 negative for protein/blood  No RBC/WBC  Reason for Consult: CKD    Review of Systems:    A complete 10-point review of systems was performed  Aside from what was mentioned in the HPI, it is otherwise negative        Historical Information   Past Medical History:   Diagnosis Date   • Chronic kidney failure, stage 2 (mild)    • Diabetes mellitus (HCC)    • GERD (gastroesophageal reflux disease)    • Gout    • Hypertension      Past Surgical History:   Procedure Laterality Date   • ANKLE FRACTURE SURGERY Right    • CHOLECYSTECTOMY       Social History   Social History     Substance and Sexual Activity   Alcohol Use Not Currently     Social History     Substance and Sexual Activity   Drug Use Never     Social History     Tobacco Use   Smoking Status Former Smoker   • Packs/day: 1 00   • Types: Cigarettes   Smokeless Tobacco Never Used       Family History:   History reviewed  No pertinent family history      Medications:  Pertinent medications were reviewed  Current Facility-Administered Medications   Medication Dose Route Frequency Provider Last Rate   • allopurinol  100 mg Oral Daily Yolanda S Sudhir, CRNP     • aspirin  81 mg Oral Daily Yolanda S Sudhir, CRNP     • cefazolin  2,000 mg Intravenous Q8H Yolanda S Sudhir, CRNP 2,000 mg (10/30/22 1357)   • heparin (porcine)  5,000 Units Subcutaneous Q8H [facility] Yolanda S Sudhir, CRNP     • HYDROcodone-acetaminophen  1 tablet Oral Q6H PRN Yolanda S Sudhir, CRNP     • influenza vaccine  0 5 mL Intramuscular Prior to discharge Hali Morrison MD     • influenza vaccine  0 5 mL Intramuscular Once Vito Berrios MD     • insulin lispro  1-6 Units Subcutaneous TID AC Yolanda S Sudhir, CRNP     • insulin lispro  1-6 Units Subcutaneous HS Yolanda S Sudhir, CRNP     • levothyroxine  150 mcg Oral Early Morning Yolanda S Sudhir, CRNP     • metoprolol succinate  100 mg Oral Daily Yolanda S Sudhir, CRNP     • metroNIDAZOLE  500 mg Intravenous Q8H Yolanda S Sudhir, CRNP 500 mg (10/30/22 0911)   • pantoprazole  40 mg Oral Early Morning Yolanda S Sudhir, CRNP     • vancomycin  20 mg/kg (Adjusted) Intravenous Q24H Yolanda S Sudhir, CRNP 1,750 mg (10/30/22 1513)         No Known Allergies      Vitals:   /68   Pulse 67   Temp 98 1 °F (36 7 °C)   Resp 20   Wt 112 kg (247 lb 2 2 oz)   SpO2 99%   BMI 35 46 kg/m²   Body mass index is 35 46 kg/m²  SpO2: 99 %,   SpO2 Activity: At Rest,   O2 Device: None (Room air)      Intake/Output Summary (Last 24 hours) at 10/30/2022 1620  Last data filed at 10/30/2022 1457  Gross per 24 hour   Intake 3080 ml   Output 1150 ml   Net 1930 ml     Invasive Devices  Report    Peripheral Intravenous Line  Duration           Peripheral IV 10/29/22 Left Antecubital 1 day                Physical Exam:  General: conscious, cooperative, in no acute distress  Eyes: conjunctivae pink, anicteric sclerae  ENT: lips and mucous membranes moist  Neck: supple, no JVD, no masses  Chest: clear breath sounds bilateral, no crackles, ronchus or wheezings  CVS: distinct S1 & S2, normal rate, regular rhythm  Abdomen: soft, non-tender, non-distended, normoactive bowel sounds  Extremities: +1 RLE edema, Right foot wrapped   Skin: no rash  Neuro: awake, alert, oriented      Diagnostic Data:  Lab: I have personally reviewed pertinent lab results  ,   CBC:  Results from last 7 days   Lab Units 10/30/22  0552   WBC Thousand/uL 12 20*   HEMOGLOBIN g/dL 9 7*   HEMATOCRIT % 29 4*   PLATELETS Thousands/uL 173      CMP:   Lab Results   Component Value Date    SODIUM 139 10/30/2022    K 4 5 10/30/2022     10/30/2022    CO2 25 10/30/2022    BUN 27 (H) 10/30/2022    CREATININE 2 22 (H) 10/30/2022    CALCIUM 8 0 (L) 10/30/2022    AST 16 10/29/2022    ALT 30 10/29/2022    ALKPHOS 67 10/29/2022    EGFR 30 10/30/2022   ,   PT/INR: No results found for: PT, INR,   Magnesium: No components found for: MAG,  Phosphorous:   Lab Results   Component Value Date    PHOS 3 4 10/30/2022       Microbiology:  @[email or web address],(urinecx:7)@        Mery Murillo    Portions of the record may have been created with voice recognition software  Occasional wrong word or "sound a like" substitutions may have occurred due to the inherent limitations of voice recognition software   Read the chart carefully and recognize, using context, where substitutions have occurred

## 2022-10-30 NOTE — PROGRESS NOTES
Vancomycin IV Pharmacy-to-Dose Consultation    Alaina Diallo is a 57 y o  male who is currently receiving Vancomycin IV with management by the Pharmacy Consult service  Assessment/Plan:  The patient was reviewed  Renal function is stable and no signs or symptoms of nephrotoxicity and/or infusion reactions were documented in the chart  Based on today’s assessment, continue current vancomycin (day # 2) dosing of 1750 mg IV q 24 hrs, with a plan for trough to be drawn at 1430 on 11/01/2022  We will continue to follow the patient’s culture results and clinical progress daily      Pop Paz

## 2022-10-30 NOTE — ASSESSMENT & PLAN NOTE
Lab Results   Component Value Date    HGBA1C 6 5 (H) 08/17/2022       Recent Labs     10/29/22  2343   POCGLU 126       Blood Sugar Average: Last 72 hrs:  (P) 126     · Glipizide on hold with CKD 4  · Will utilize sliding scale insulin coverage with Accu-Cheks  · Carbohydrate controlled diet  · Hypoglycemia protocol

## 2022-10-30 NOTE — PROGRESS NOTES
Vancomycin Assessment    Amber Friend is a 57 y o  male who is currently receiving vancomycin 1250mg load in ED (15mg/kg ABW) for skin-soft tissue infection     Relevant clinical data and objective history reviewed:  Creatinine   Date Value Ref Range Status   10/29/2022 2 42 (H) 0 60 - 1 30 mg/dL Final     Comment:     Standardized to IDMS reference method   08/24/2022 2 41 (H) 0 60 - 1 30 mg/dL Final     Comment:     Standardized to IDMS reference method     /66   Pulse 87   Temp 99 5 °F (37 5 °C) (Oral)   Resp 18   Wt 109 kg (240 lb)   SpO2 95%   BMI 34 44 kg/m²   No intake/output data recorded  Lab Results   Component Value Date/Time    BUN 28 (H) 10/29/2022 08:32 PM    WBC 14 70 (H) 10/29/2022 08:32 PM    HGB 11 1 (L) 10/29/2022 08:32 PM    HCT 33 0 (L) 10/29/2022 08:32 PM     (H) 10/29/2022 08:32 PM     10/29/2022 08:32 PM     Temp Readings from Last 3 Encounters:   10/29/22 99 5 °F (37 5 °C) (Oral)   08/24/22 98 1 °F (36 7 °C) (Temporal)     Vancomycin Days of Therapy: 1    Assessment/Plan  The patient is currently on vancomycin utilizing scheduled dosing based on adjusted body weight (due to obesity)  Baseline risks associated with therapy include: dehydration  The patient is currently receiving 1250mg load in ED (15mg/kg ABW) and after clinical evaluation will be changed to 1750mg q24h (20mg/kg ABW), starting 17hr from first dose  [facility] for s/sx of nephrotoxicity, infusion reactions, and appropriateness of therapy  BMP and CBC will be ordered per protocol  Plan for trough as patient approaches steady state, prior to the 4th  dose at approximately 1430 on 11/1/22  Due to infection severity, will target a trough of 15-20 (appropriate for most indications)   Pharmacy will continue to follow the patient’s culture results and clinical progress daily      Dar Tijerina, Pharmacist

## 2022-10-31 ENCOUNTER — APPOINTMENT (INPATIENT)
Dept: ULTRASOUND IMAGING | Facility: HOSPITAL | Age: 62
End: 2022-10-31

## 2022-10-31 ENCOUNTER — APPOINTMENT (INPATIENT)
Dept: MRI IMAGING | Facility: HOSPITAL | Age: 62
End: 2022-10-31

## 2022-10-31 LAB
ANION GAP SERPL CALCULATED.3IONS-SCNC: 6 MMOL/L (ref 4–13)
BUN SERPL-MCNC: 30 MG/DL (ref 5–25)
CALCIUM SERPL-MCNC: 8.3 MG/DL (ref 8.3–10.1)
CHLORIDE SERPL-SCNC: 106 MMOL/L (ref 96–108)
CO2 SERPL-SCNC: 25 MMOL/L (ref 21–32)
CREAT SERPL-MCNC: 2.28 MG/DL (ref 0.6–1.3)
ERYTHROCYTE [DISTWIDTH] IN BLOOD BY AUTOMATED COUNT: 12.3 % (ref 11.6–15.1)
FERRITIN SERPL-MCNC: 299 NG/ML (ref 8–388)
FOLATE SERPL-MCNC: 14.3 NG/ML (ref 3.1–17.5)
GFR SERPL CREATININE-BSD FRML MDRD: 29 ML/MIN/1.73SQ M
GLUCOSE SERPL-MCNC: 104 MG/DL (ref 65–140)
GLUCOSE SERPL-MCNC: 105 MG/DL (ref 65–140)
GLUCOSE SERPL-MCNC: 121 MG/DL (ref 65–140)
GLUCOSE SERPL-MCNC: 131 MG/DL (ref 65–140)
GLUCOSE SERPL-MCNC: 145 MG/DL (ref 65–140)
HCT VFR BLD AUTO: 28.4 % (ref 36.5–49.3)
HCV AB SER QL: NORMAL
HGB BLD-MCNC: 9.3 G/DL (ref 12–17)
IRON SATN MFR SERPL: 12 % (ref 20–50)
IRON SERPL-MCNC: 20 UG/DL (ref 65–175)
MCH RBC QN AUTO: 33.9 PG (ref 26.8–34.3)
MCHC RBC AUTO-ENTMCNC: 32.7 G/DL (ref 31.4–37.4)
MCV RBC AUTO: 104 FL (ref 82–98)
PLATELET # BLD AUTO: 179 THOUSANDS/UL (ref 149–390)
PMV BLD AUTO: 9.9 FL (ref 8.9–12.7)
POTASSIUM SERPL-SCNC: 4.5 MMOL/L (ref 3.5–5.3)
RBC # BLD AUTO: 2.74 MILLION/UL (ref 3.88–5.62)
SODIUM SERPL-SCNC: 137 MMOL/L (ref 135–147)
TIBC SERPL-MCNC: 164 UG/DL (ref 250–450)
VIT B12 SERPL-MCNC: 379 PG/ML (ref 100–900)
WBC # BLD AUTO: 10.49 THOUSAND/UL (ref 4.31–10.16)

## 2022-10-31 RX ADMIN — METOPROLOL SUCCINATE 100 MG: 100 TABLET, EXTENDED RELEASE ORAL at 08:26

## 2022-10-31 RX ADMIN — ALLOPURINOL 100 MG: 100 TABLET ORAL at 08:26

## 2022-10-31 RX ADMIN — CEFAZOLIN SODIUM 2000 MG: 2 SOLUTION INTRAVENOUS at 21:11

## 2022-10-31 RX ADMIN — METRONIDAZOLE 500 MG: 500 INJECTION, SOLUTION INTRAVENOUS at 07:36

## 2022-10-31 RX ADMIN — CEFAZOLIN SODIUM 2000 MG: 2 SOLUTION INTRAVENOUS at 06:21

## 2022-10-31 RX ADMIN — VANCOMYCIN HYDROCHLORIDE 1750 MG: 1 INJECTION, POWDER, LYOPHILIZED, FOR SOLUTION INTRAVENOUS at 14:33

## 2022-10-31 RX ADMIN — METRONIDAZOLE 500 MG: 500 INJECTION, SOLUTION INTRAVENOUS at 16:58

## 2022-10-31 RX ADMIN — HEPARIN SODIUM 5000 UNITS: 5000 INJECTION INTRAVENOUS; SUBCUTANEOUS at 06:22

## 2022-10-31 RX ADMIN — CEFAZOLIN SODIUM 2000 MG: 2 SOLUTION INTRAVENOUS at 13:08

## 2022-10-31 RX ADMIN — HEPARIN SODIUM 5000 UNITS: 5000 INJECTION INTRAVENOUS; SUBCUTANEOUS at 21:11

## 2022-10-31 RX ADMIN — PANTOPRAZOLE SODIUM 40 MG: 40 TABLET, DELAYED RELEASE ORAL at 06:22

## 2022-10-31 RX ADMIN — LEVOTHYROXINE SODIUM 150 MCG: 150 TABLET ORAL at 06:22

## 2022-10-31 RX ADMIN — HEPARIN SODIUM 5000 UNITS: 5000 INJECTION INTRAVENOUS; SUBCUTANEOUS at 13:07

## 2022-10-31 RX ADMIN — HYDROCODONE BITARTRATE AND ACETAMINOPHEN 1 TABLET: 5; 325 TABLET ORAL at 13:06

## 2022-10-31 RX ADMIN — ASPIRIN 81 MG: 81 TABLET, COATED ORAL at 08:26

## 2022-10-31 RX ADMIN — METRONIDAZOLE 500 MG: 500 INJECTION, SOLUTION INTRAVENOUS at 00:10

## 2022-10-31 NOTE — ASSESSMENT & PLAN NOTE
Lab Results   Component Value Date    EGFR 29 10/31/2022    EGFR 30 10/30/2022    EGFR 27 10/29/2022    CREATININE 2 28 (H) 10/31/2022    CREATININE 2 22 (H) 10/30/2022    CREATININE 2 42 (H) 10/29/2022     · Review of care everywhere, patient has CKD with creatinine > 2 since 2020  · Creatinine at baseline on admission- history CKD 4  · Referred to Nephrology by PCP but declined  · Follow nephrology recommends  · Trend creatinine  · Renally dose medications  · PTA losartan on hold

## 2022-10-31 NOTE — CONSULTS
Consult - Podiatry   Kylee Bagley 57 y o  male MRN: [id number]  Unit/Bed#: -01 Encounter: [id number]    Assessment/Plan     Assessment:  1  Right 2nd toe diabetic ulceration, suspected osteomyelitis    2  Right plantar forefoot diabetic ulceration  3  Right foot cellulitis and septic on admission due to #1/2  4  DM (A1c 6 5% 8/17/22)  5  CKD 4    Plan:  - Await results of 10/31/22 MRI right foot to determine further plan  Right 2nd toe wound does probe to bone of distal phalanx with erosion to bone noted, likely OM  Right plantar foot ulceration does probe deep however not straight to bone, R/O abscess  - 10/29/22 XR right foot 2 views: There appears to be some osseous erosion to distal phalanx of 2nd toe  No GABRIELA noted  Large plantar soft tissue deficit corresponding with ulceration clinically  - 10/13/22 LEADs: R 1 15/128/122 WNL  L 1 13/>255/120 WNL  - ESR/CRP elevated, leukocytosis on admission  - Rest of care per primary team  Thank you for consultation  Abx: per primary team  Wcx pending, tailor per results  WBS: WBAT toe unloading shoe    History of Present Illness     HPI:  Kylee Bagley is a 57 y o  male w/ PMH of diabetes mellitus, CKD, hypertension, obesity, right ankle ORIF presenting to ED 10/29/22 for right foot redness, pain, swelling with fever, chills  Note she has had right plantar foot and 2nd toe ulcerations for over one month  Going to advanced wound care center in [facility] care  Recently had LEADs done  Inpatient consult to Podiatry  Consult performed by: Jennifer Alves DPM  Consult ordered by: Pranay Reyes MD        Review of Systems   Constitutional: Chills, fever on admission    HENT: Negative  Eyes: Negative  Respiratory: Negative  Cardiovascular: Negative  Gastrointestinal: Negative  Musculoskeletal: right foot pain  Skin: Right foot ulceration x2   Neurological: Negative  Psych: negative         Historical Information   Past Medical History:   Diagnosis Date   • Chronic kidney failure, stage 2 (mild)    • Diabetes mellitus (HCC)    • GERD (gastroesophageal reflux disease)    • Gout    • Hypertension      Past Surgical History:   Procedure Laterality Date   • ANKLE FRACTURE SURGERY Right    • CHOLECYSTECTOMY       Social History   Social History     Substance and Sexual Activity   Alcohol Use Not Currently     Social History     Substance and Sexual Activity   Drug Use Never     Social History     Tobacco Use   Smoking Status Former Smoker   • Packs/day: 1 00   • Types: Cigarettes   Smokeless Tobacco Never Used     Family History: History reviewed  No pertinent family history      Meds/Allergies   Medications Prior to Admission   Medication   • allopurinol (ZYLOPRIM) 100 mg tablet   • aspirin (ECOTRIN LOW STRENGTH) 81 mg EC tablet   • atorvastatin (LIPITOR) 20 mg tablet   • co-enzyme Q-10 100 mg capsule   • glipiZIDE (GLUCOTROL XL) 5 mg 24 hr tablet   • levothyroxine 150 mcg tablet   • losartan (COZAAR) 50 mg tablet   • metoprolol succinate (TOPROL-XL) 100 mg 24 hr tablet   • omeprazole (PriLOSEC) 20 mg delayed release capsule     No Known Allergies    Objective   First Vitals:   Blood Pressure: 138/85 (10/29/22 2010)  Pulse: 98 (10/29/22 2010)  Temperature: (!) 102 4 °F (39 1 °C) (10/29/22 2010)  Temp Source: Oral (10/29/22 2010)  Respirations: 16 (10/29/22 2010)  Weight - Scale: 109 kg (240 lb) (10/29/22 2010)  SpO2: 98 % (10/29/22 2010)    Current Vitals:   Blood Pressure: 119/72 (10/31/22 0714)  Pulse: 64 (10/31/22 0714)  Temperature: 98 4 °F (36 9 °C) (10/31/22 0714)  Temp Source: Temporal (10/30/22 2328)  Respirations: 18 (10/31/22 0714)  Weight - Scale: 112 kg (247 lb 2 2 oz) (10/29/22 2344)  SpO2: 97 % (10/31/22 0714)        /72   Pulse 64   Temp 98 4 °F (36 9 °C)   Resp 18   Wt 112 kg (247 lb 2 2 oz)   SpO2 97%   BMI 35 46 kg/m²      General Appearance:    Alert, cooperative, no distress   Head:    Normocephalic, without obvious abnormality, atraumatic   Eyes:    PERRL, conjunctiva/corneas clear, EOM's intact        Nose:   Moist mucous membranes   Neck:   Supple, symmetrical, trachea midline   Back:     Symmetric   Lungs:     Respirations unlabored   Heart:    Regular rate and rhythm, S1 and S2 normal, no murmur, rub   or gallop   Abdomen:     Soft, non-tender      B/L LE EXAM   Extremities:   ROM ankles and toes WNL  Mild right foot pain  Pulses:   B/L DP & PT pulses 1/4  Legs to toes warm to warm  Pedal hair diminished  CRT WNL  Skin:   No open wounds left foot  There is edema, erythema to dorsal 2/3 toes and forefoot  Some erythema to plantar foot  No purulence, crepitus, fluctuance noted to sites of wounds  Right distal 2nd toe ulceration measuring 0 2x0 2x0 1cm with direct probe to bone of distal phalanx, fibrous base  Right plantar foot ulceration measures approx 1 0x1 0x3 0cm with full thickness deficit, does probe deep and undermines at 6 o'clock, this does not probe directly to bone however   Neurologic:   Gross sensation is intact  Protective sensation is Diminished  Does noted minor N/T/B to feet                         Lab Results:   Admission on 10/29/2022   Component Date Value   • WBC 10/29/2022 14 70 (A)   • RBC 10/29/2022 3 30 (A)   • Hemoglobin 10/29/2022 11 1 (A)   • Hematocrit 10/29/2022 33 0 (A)   • MCV 10/29/2022 100 (A)   • MCH 10/29/2022 33 6    • MCHC 10/29/2022 33 6    • RDW 10/29/2022 12 2    • MPV 10/29/2022 10 2    • Platelets 78/97/1628 241    • nRBC 10/29/2022 0    • Neutrophils Relative 10/29/2022 75    • Immat GRANS % 10/29/2022 0    • Lymphocytes Relative 10/29/2022 12 (A)   • Monocytes Relative 10/29/2022 13 (A)   • Eosinophils Relative 10/29/2022 0    • Basophils Relative 10/29/2022 0    • Neutrophils Absolute 10/29/2022 10 89 (A)   • Immature Grans Absolute 10/29/2022 0 06    • Lymphocytes Absolute 10/29/2022 1 72    • Monocytes Absolute 10/29/2022 1 96 (A)   • Eosinophils Absolute 10/29/2022 0 05    • Basophils Absolute 10/29/2022 0 02    • Sodium 10/29/2022 136    • Potassium 10/29/2022 4 6    • Chloride 10/29/2022 101    • CO2 10/29/2022 25    • ANION GAP 10/29/2022 10    • BUN 10/29/2022 28 (A)   • Creatinine 10/29/2022 2 42 (A)   • Glucose 10/29/2022 151 (A)   • Calcium 10/29/2022 8 5    • Corrected Calcium 10/29/2022 9 1    • AST 10/29/2022 16    • ALT 10/29/2022 30    • Alkaline Phosphatase 10/29/2022 67    • Total Protein 10/29/2022 8 0    • Albumin 10/29/2022 3 3 (A)   • Total Bilirubin 10/29/2022 0 99    • eGFR 10/29/2022 27    • LACTIC ACID 10/29/2022 1 2    • Procalcitonin 10/29/2022 0 27 (A)   • Sed Rate 10/29/2022 71 (A)   • CRP 10/29/2022 120 5 (A)   • Blood Culture 10/29/2022 Received in Microbiology Lab  Culture in Progress  • Blood Culture 10/29/2022 Received in Microbiology Lab  Culture in Progress      • Gram Stain Result 10/29/2022 4+ Gram positive cocci in pairs and chains (A)   • Gram Stain Result 10/29/2022 4+ Gram negative coccobacilli (A)   • Gram Stain Result 10/29/2022 No polys seen (A)   • POC Glucose 10/29/2022 126    • Color, UA 10/30/2022 Yellow    • Clarity, UA 10/30/2022 Clear    • Specific Gravity, UA 10/30/2022 1 015    • pH, UA 10/30/2022 6 0    • Leukocytes, UA 10/30/2022 Negative    • Nitrite, UA 10/30/2022 Negative    • Protein, UA 10/30/2022 Negative    • Glucose, UA 10/30/2022 Negative    • Ketones, UA 10/30/2022 Negative    • Urobilinogen, UA 10/30/2022 0 2    • Bilirubin, UA 10/30/2022 Negative    • Occult Blood, UA 10/30/2022 Negative    • RBC, UA 10/30/2022 None Seen    • WBC, UA 10/30/2022 None Seen    • Epithelial Cells 10/30/2022 None Seen    • Bacteria, UA 10/30/2022 None Seen    • Sodium 10/30/2022 139    • Potassium 10/30/2022 4 5    • Chloride 10/30/2022 106    • CO2 10/30/2022 25    • ANION GAP 10/30/2022 8    • BUN 10/30/2022 27 (A)   • Creatinine 10/30/2022 2 22 (A)   • Glucose 10/30/2022 84    • Calcium 10/30/2022 8 0 (A)   • eGFR 10/30/2022 30    • Magnesium 10/30/2022 1 6    • Phosphorus 10/30/2022 3 4    • WBC 10/30/2022 12 20 (A)   • RBC 10/30/2022 2 82 (A)   • Hemoglobin 10/30/2022 9 7 (A)   • Hematocrit 10/30/2022 29 4 (A)   • MCV 10/30/2022 104 (A)   • MCH 10/30/2022 34 4 (A)   • MCHC 10/30/2022 33 0    • RDW 10/30/2022 12 3    • MPV 10/30/2022 9 9    • Platelets 74/62/0223 173    • nRBC 10/30/2022 0    • Neutrophils Relative 10/30/2022 68    • Immat GRANS % 10/30/2022 1    • Lymphocytes Relative 10/30/2022 17    • Monocytes Relative 10/30/2022 13 (A)   • Eosinophils Relative 10/30/2022 1    • Basophils Relative 10/30/2022 0    • Neutrophils Absolute 10/30/2022 8 42 (A)   • Immature Grans Absolute 10/30/2022 0 08    • Lymphocytes Absolute 10/30/2022 2 08    • Monocytes Absolute 10/30/2022 1 52 (A)   • Eosinophils Absolute 10/30/2022 0 08    • Basophils Absolute 10/30/2022 0 02    • Hepatitis C Ab 10/30/2022 Non-reactive    • TSH 3RD GENERATON 10/30/2022 0 618    • POC Glucose 10/30/2022 85    • POC Glucose 10/30/2022 172 (A)   • POC Glucose 10/30/2022 162 (A)   • Creatinine, Ur 10/30/2022 87 2    • Protein Urine Random 10/30/2022 32    • Prot/Creat Ratio, Ur 10/30/2022 0 37 (A)   • Creatinine, Ur 10/30/2022 87 2    • Microalbum  ,U,Random 10/30/2022 99 9 (A)   • Microalb Creat Ratio 10/30/2022 115 (A)   • POC Glucose 10/30/2022 167 (A)   • Sodium 10/31/2022 137    • Potassium 10/31/2022 4 5    • Chloride 10/31/2022 106    • CO2 10/31/2022 25    • ANION GAP 10/31/2022 6    • BUN 10/31/2022 30 (A)   • Creatinine 10/31/2022 2 28 (A)   • Glucose 10/31/2022 121    • Calcium 10/31/2022 8 3    • eGFR 10/31/2022 29    • WBC 10/31/2022 10 49 (A)   • RBC 10/31/2022 2 74 (A)   • Hemoglobin 10/31/2022 9 3 (A)   • Hematocrit 10/31/2022 28 4 (A)   • MCV 10/31/2022 104 (A)   • MCH 10/31/2022 33 9    • MCHC 10/31/2022 32 7    • RDW 10/31/2022 12 3    • Platelets 49/12/2104 179    • MPV 10/31/2022 9 9    • Vitamin B-12 10/31/2022 379    • Folate 10/31/2022 14 3    • Iron Saturation 10/31/2022 12 (A)   • TIBC 10/31/2022 164 (A)   • Iron 10/31/2022 20 (A)   • Ferritin 10/31/2022 299    • POC Glucose 10/31/2022 105        Results from last 7 days   Lab Units 10/29/22  2032   GRAM STAIN RESULT  4+ Gram positive cocci in pairs and chains*  4+ Gram negative coccobacilli*  No polys seen*       Results from last 7 days   Lab Units 10/29/22  2032   BLOOD CULTURE  Received in Microbiology Lab  Culture in Progress  Received in Microbiology Lab  Culture in Progress  Invalid input(s): LABAEARO            Imaging: I have personally reviewed pertinent films in PACS  EKG, Pathology, and Other Studies: I have personally reviewed pertinent reports        Code Status: Level 1 - Full Code  Advance Directive and Living Will:      Power of :    POLST:

## 2022-10-31 NOTE — ASSESSMENT & PLAN NOTE
· Infected diabetic plantar ulcer  · Plantar wound present for 1 month and has been followed by [facility] outpatient  · Vascular duplex obtained bilaterally on 10/13/2022 without evidence of significant stenosis  · Completed ABX course of Keflex  · Developed acute pain, fever, chills, redness and swelling of right foot on 10/28  · Presents with significant edema/erythema of right foot, 2nd and 3rd digits with sausage like appearance  · Leukocytosis, elevated inflammatory markers  · X-ray right foot- bony erosion of distal 2nd digit  · MRI of the foot shows osteomyelitis  · Podiatry consult appreciated-plan is to do surgery on 11/02/22-patient is to be NPO after midnight, and also hold heparin after night dose on 11/01/2022  · Patient remained medically optimize for upcoming podiatry could procedure, patient has 2 points on revised cardiac risk index for preoperative risk

## 2022-10-31 NOTE — ASSESSMENT & PLAN NOTE
· Secondary to cellulitis of foot-suspected osteomyelitis  · Continue IV antibiotic  · Podiatry consult appreciated  · Follow ID recommendation

## 2022-10-31 NOTE — PROGRESS NOTES
Vancomycin IV Pharmacy-to-Dose Consultation    Paul Vicente is a 57 y o  male who is currently receiving Vancomycin IV with management by the Pharmacy Consult service  Assessment/Plan:  The patient was reviewed  Renal function is stable and no signs or symptoms of nephrotoxicity and/or infusion reactions were documented in the chart  Based on today’s assessment, continue current vancomycin (day # 3) dosing of 1750 mg IV q 24 hrs, with a plan for trough to be drawn at 1430 on 11/01/2022  We will continue to follow the patient’s culture results and clinical progress daily      Janet Cisneros

## 2022-10-31 NOTE — PROGRESS NOTES
[address]  Progress Note - Paul Later 1960, 57 y o  male MRN: [id number]  Unit/Bed#: MS Emerson-Guillermo Encounter: [id number]  Primary Care Provider: Sunshine Espitia MD   Date and time admitted to hospital: 10/29/2022  8:12 PM    Osteomyelitis of foot, right, acute [facility])  Assessment & Plan  · Infected diabetic plantar ulcer  · Plantar wound present for 1 month and has been followed by [facility] outpatient  · Vascular duplex obtained bilaterally on 10/13/2022 without evidence of significant stenosis  · Completed ABX course of Keflex  · Developed acute pain, fever, chills, redness and swelling of right foot on 10/28  · Presents with significant edema/erythema of right foot, 2nd and 3rd digits with sausage like appearance  · Leukocytosis, elevated inflammatory markers  · X-ray right foot- bony erosion of distal 2nd digit  · MRI of the foot shows osteomyelitis  · Podiatry consult appreciated-plan is to do surgery on 11/02/22-patient is to be NPO after midnight, and also hold heparin after night dose on 11/01/2022  · Patient remained medically optimize for upcoming podiatry could procedure, patient has 2 points on revised cardiac risk index for preoperative risk    * Sepsis ([facility] [address] )  Assessment & Plan  · Secondary to cellulitis of foot-suspected osteomyelitis  · Continue IV antibiotic  · Podiatry consult appreciated  · Follow ID recommendation    Type 2 diabetes mellitus with circulatory disorder, without long-term current use of insulin [facility])  Assessment & Plan  Lab Results   Component Value Date    HGBA1C 6 5 (H) 08/17/2022       Recent Labs     10/30/22  1557 10/30/22  2054 10/31/22  0711 10/31/22  1051   POCGLU 162* 167* 105 145*       Blood Sugar Average: Last 72 hrs:  (P) 137 [id number]     · Glipizide on hold with CKD 4  ·  sliding scale insulin coverage with Accu-Cheks  · Carbohydrate controlled diet  · Hypoglycemia protocol    Chronic anemia  Assessment & Plan  · Chronic anemia  · Hemoglobin 11 1 on admission appears baseline  · Probable secondary to CKD 4  · Monitor closely    Essential hypertension  Assessment & Plan  · Continue PTA metoprolol succinate 100 mg daily  · PTA losartan on hold due to CKD 4  · Trend blood pressures    Stage 4 chronic kidney disease [facility])  Assessment & Plan  Lab Results   Component Value Date    EGFR 29 10/31/2022    EGFR 30 10/30/2022    EGFR 27 10/29/2022    CREATININE 2 28 (H) 10/31/2022    CREATININE 2 22 (H) 10/30/2022    CREATININE 2 42 (H) 10/29/2022     · Review of care everywhere, patient has CKD with creatinine > 2 since   · Creatinine at baseline on admission- history CKD 4  · Referred to Nephrology by PCP but declined  · Follow nephrology recommends  · Trend creatinine  · Renally dose medications  · PTA losartan on hold      Acquired hypothyroidism  Assessment & Plan  · Continue PTA levothyroxine 150 mcg daily      VTE Pharmacologic Prophylaxis: VTE Score: 5 High Risk (Score >/= 5) - Pharmacological DVT Prophylaxis Ordered: heparin  Sequential Compression Devices Ordered  Patient Centered Rounds: I performed bedside rounds with nursing staff today  Discussions with Specialists or Other Care Team Provider:  Podiatry    Education and Discussions with Family / Patient: Updated  (Family member on the bedside) at bedside  Time Spent for Care: 15 minutes  More than 50% of total time spent on counseling and coordination of care as described above  Current Length of Stay: 2 day(s)  Current Patient Status: Inpatient   Certification Statement: The patient will continue to require additional inpatient hospital stay due to To monitor above condition  Discharge Plan: Anticipate discharge in 48-72 hrs to To be determined    Code Status: Level 1 - Full Code    Subjective:   Seen and evaluated during the round  Resting comfortably  Denies any significant complaint      Objective:     Vitals:   Temp (24hrs), Av 5 °F (36 9 °C), Min:98 1 °F (36 7 °C), Max:99 °F (37 2 °C)    Temp:  [98 1 °F (36 7 °C)-99 °F (37 2 °C)] 98 4 °F (36 9 °C)  HR:  [64-80] 64  Resp:  [18-20] 18  BP: (107-142)/(57-72) 119/72  SpO2:  [95 %-99 %] 97 %  Body mass index is 35 46 kg/m²  Input and Output Summary (last 24 hours): Intake/Output Summary (Last 24 hours) at 10/31/2022 1410  Last data filed at 10/31/2022 1319  Gross per 24 hour   Intake 900 ml   Output 3075 ml   Net -2175 ml       Physical Exam:   Physical Exam  Vitals and nursing note reviewed  Exam conducted with a chaperone present  Constitutional:       Appearance: Normal appearance  He is not ill-appearing or diaphoretic  HENT:      Head: Normocephalic and atraumatic  Mouth/Throat:      Mouth: Mucous membranes are moist       Pharynx: Oropharynx is clear  No oropharyngeal exudate  Eyes:      General: No scleral icterus  Left eye: No discharge  Extraocular Movements: Extraocular movements intact  Conjunctiva/sclera: Conjunctivae normal       Pupils: Pupils are equal, round, and reactive to light  Neck:      Vascular: No carotid bruit  Cardiovascular:      Rate and Rhythm: Normal rate  Heart sounds: Normal heart sounds  No murmur heard  No friction rub  No gallop  Pulmonary:      Effort: Pulmonary effort is normal  No respiratory distress  Breath sounds: No stridor  No wheezing or rhonchi  Abdominal:      General: Abdomen is flat  Bowel sounds are normal  There is no distension  Palpations: There is no mass  Tenderness: There is no abdominal tenderness  Hernia: No hernia is present  Musculoskeletal:         General: No swelling, tenderness, deformity or signs of injury  Cervical back: Normal range of motion  No rigidity or tenderness  Lymphadenopathy:      Cervical: No cervical adenopathy  Skin:     General: Skin is warm        Comments: Foot ulcer documented in the med section   Neurological:      General: No focal deficit present  Mental Status: He is alert and oriented to person, place, and time  Cranial Nerves: No cranial nerve deficit  Sensory: No sensory deficit  Motor: No weakness  Coordination: Coordination normal    Psychiatric:         Mood and Affect: Mood normal          Additional Data:     Labs:  Results from last 7 days   Lab Units 10/31/22  0502 10/30/22  0552   WBC Thousand/uL 10 49* 12 20*   HEMOGLOBIN g/dL 9 3* 9 7*   HEMATOCRIT % 28 4* 29 4*   PLATELETS Thousands/uL 179 173   NEUTROS PCT %  --  68   LYMPHS PCT %  --  17   MONOS PCT %  --  13*   EOS PCT %  --  1     Results from last 7 days   Lab Units 10/31/22  0502 10/30/22  0552 10/29/22  2032   SODIUM mmol/L 137   < > 136   POTASSIUM mmol/L 4 5   < > 4 6   CHLORIDE mmol/L 106   < > 101   CO2 mmol/L 25   < > 25   BUN mg/dL 30*   < > 28*   CREATININE mg/dL 2 28*   < > 2 42*   ANION GAP mmol/L 6   < > 10   CALCIUM mg/dL 8 3   < > 8 5   ALBUMIN g/dL  --   --  3 3*   TOTAL BILIRUBIN mg/dL  --   --  0 99   ALK PHOS U/L  --   --  67   ALT U/L  --   --  30   AST U/L  --   --  16   GLUCOSE RANDOM mg/dL 121   < > 151*    < > = values in this interval not displayed  Results from last 7 days   Lab Units 10/31/22  1051 10/31/22  0711 10/30/22  2054 10/30/22  1557 10/30/22  1112 10/30/22  0732 10/29/22  2343   POC GLUCOSE mg/dl 145* 105 167* 162* 172* 85 126         Results from last 7 days   Lab Units 10/29/22  2032   LACTIC ACID mmol/L 1 2   PROCALCITONIN ng/ml 0 27*       Lines/Drains:  Invasive Devices  Report    Peripheral Intravenous Line  Duration           Peripheral IV 10/29/22 Left Antecubital 2 days                      Imaging: No pertinent imaging reviewed  Recent Cultures (last 7 days):   Results from last 7 days   Lab Units 10/29/22  2032   BLOOD CULTURE  Received in Microbiology Lab  Culture in Progress  Received in Microbiology Lab  Culture in Progress     GRAM STAIN RESULT  4+ Gram positive cocci in pairs and chains*  4+ Gram negative coccobacilli*  No polys seen*   WOUND CULTURE  4+ Growth of Beta Hemolytic Streptococcus Group G*  1+ Growth of Gram Negative Miko*       Last 24 Hours Medication List:   Current Facility-Administered Medications   Medication Dose Route Frequency Provider Last Rate   • allopurinol  100 mg Oral Daily Yolanda S Sudhir, CRNP     • aspirin  81 mg Oral Daily Yolanda S Sudhir, CRNP     • cefazolin  2,000 mg Intravenous Q8H Yolanda S Sudhir, CRNP 2,000 mg (10/31/22 1308)   • heparin (porcine)  5,000 Units Subcutaneous Q8H [address] Yolanda S Sudhir, CRNP     • HYDROcodone-acetaminophen  1 tablet Oral Q6H PRN Yolanda S Sudhir, CRNP     • insulin lispro  1-6 Units Subcutaneous TID AC Yolanda S Sudhir, CRNP     • insulin lispro  1-6 Units Subcutaneous HS Yolanda S Sudhir, CRNP     • levothyroxine  150 mcg Oral Early Morning Yolanda S Sudhir, CRNP     • metoprolol succinate  100 mg Oral Daily Yolanda S Sudhir, CRNP     • metroNIDAZOLE  500 mg Intravenous Q8H Yolanda S Sudhir, CRNP 500 mg (10/31/22 4536)   • pantoprazole  40 mg Oral Early Morning Yolanda S Sudhir, CRNP     • vancomycin  20 mg/kg (Adjusted) Intravenous Q24H Yolanda S Sudhir, CRNP 1,750 mg (10/30/22 4873)        Today, Patient Was Seen By: Leonides Berrios    **Please Note: This note may have been constructed using a voice recognition system  **

## 2022-10-31 NOTE — WOUND OSTOMY CARE
Consult Note - Wound   Kelly Wilde 57 y o  male MRN: [id number]  Unit/Bed#: -01 Encounter: [id number]        History and Present Illness:  57year old male admitted with Sepsis,PMH suspected osteomyelitis of right foot, acute  Stage 4 chronic kidney disease  Type 2 DM ,chronic anemia,Alert and oriented   Seated our of bed in recliner  Seen today for initial wound consult  Patient follows with Dr Carmelina Moon in [place]  States he has this wound about one month which began as a callous  He had the wound debrided about 3 weeks ago at a wound care center in [place]  His next scheduled appointment at the wound center was for tomorrow, 11/01/22  Pt had podiatry consult ordered, no other skin issues will sign off  Independent with mobility  Continent of bowel and bladder  Assessment Findings:   1)Buttocks and sacrum intact  3)Right foot dsd clara intact, wet to dry ordered with consult to podiatry  Skin care plans:  1-Hydraguard to bilateral sacrum, buttock and heels BID and PRN  2-Elevate heels to offload pressure  3-Ehob cushion in chair when out of bed  4-Moisturize skin daily with skin nourishing cream   5-Turn/reposition q2h or when medically stable for pressure re-distribution on skin         Wounds:        Wound Care will sign off  Call or Tigertext with any questions    Edna VELARDE RN

## 2022-10-31 NOTE — ASSESSMENT & PLAN NOTE
Lab Results   Component Value Date    HGBA1C 6 5 (H) 08/17/2022       Recent Labs     10/30/22  1557 10/30/22  2054 10/31/22  0711 10/31/22  1051   POCGLU 162* 167* 105 145*       Blood Sugar Average: Last 72 hrs:  (P) 137 [id number]     · Glipizide on hold with CKD 4  ·  sliding scale insulin coverage with Accu-Cheks  · Carbohydrate controlled diet  · Hypoglycemia protocol

## 2022-10-31 NOTE — PLAN OF CARE
Problem: PAIN - ADULT  Goal: Verbalizes/displays adequate comfort level or baseline comfort level  Description: Interventions:  - Encourage patient to monitor pain and request assistance  - Assess pain using appropriate pain scale  - Administer analgesics based on type and severity of pain and evaluate response  - Implement non-pharmacological measures as appropriate and evaluate response  - Consider cultural and social influences on pain and pain management  - Notify physician/advanced practitioner if interventions unsuccessful or patient reports new pain  Outcome: Progressing     Problem: INFECTION - ADULT  Goal: Absence or prevention of progression during hospitalization  Description: INTERVENTIONS:  - Assess and monitor for signs and symptoms of infection  - Monitor lab/diagnostic results  - Monitor all insertion sites, i e  indwelling lines, tubes, and drains  - Monitor endotracheal if appropriate and nasal secretions for changes in amount and color  - [place] appropriate cooling/warming therapies per order  - Administer medications as ordered  - Instruct and encourage patient and family to use good hand hygiene technique  - Identify and instruct in appropriate isolation precautions for identified infection/condition  Outcome: Progressing  Goal: Absence of fever/infection during neutropenic period  Description: INTERVENTIONS:  - Monitor WBC    Outcome: Progressing     Problem: SAFETY ADULT  Goal: Patient will remain free of falls  Description: INTERVENTIONS:  - Educate patient/family on patient safety including physical limitations  - Instruct patient to call for assistance with activity   - Consult OT/PT to assist with strengthening/mobility   - Keep Call bell within reach  - Keep bed low and locked with side rails adjusted as appropriate  - Keep care items and personal belongings within reach  - Initiate and maintain comfort rounds  - Make Fall Risk Sign visible to staff  - Offer Toileting every 2 Hours, in advance of need  - Initiate/Maintain bed/chair alarm  - Obtain necessary fall risk management equipment:   - Apply yellow socks and bracelet for high fall risk patients  - Consider moving patient to room near nurses station  Outcome: Progressing  Goal: Maintain or return to baseline ADL function  Description: INTERVENTIONS:  - Educate patient/family on patient safety including physical limitations  - Instruct patient to call for assistance with activity   - Consult OT/PT to assist with strengthening/mobility   - Keep Call bell within reach  - Keep bed low and locked with side rails adjusted as appropriate  - Keep care items and personal belongings within reach  - Initiate and maintain comfort rounds  - Make Fall Risk Sign visible to staff  - Offer Toileting every 2 Hours, in advance of need  - Initiate/Maintain bed/chair alarm  - Obtain necessary fall risk management equipment:   - Apply yellow socks and bracelet for high fall risk patients  - Consider moving patient to room near nurses station  Outcome: Progressing  Goal: Maintains/Returns to pre admission functional level  Description: INTERVENTIONS:  - Perform BMAT or MOVE assessment daily    - Set and communicate daily mobility goal to care team and patient/family/caregiver     - Out of bed for meals 3 times a day  - Out of bed for toileting  - Record patient progress and toleration of activity level   Outcome: Progressing     Problem: DISCHARGE PLANNING  Goal: Discharge to home or other facility with appropriate resources  Description: INTERVENTIONS:  - Identify barriers to discharge w/patient and caregiver  - Arrange for needed discharge resources and transportation as appropriate  - Identify discharge learning needs (meds, wound care, etc )  - Arrange for interpretive services to assist at discharge as needed  - Refer to Case Management Department for coordinating discharge planning if the patient needs post-hospital services based on physician/advanced practitioner order or complex needs related to functional status, cognitive ability, or social support system  Outcome: Progressing     Problem: Knowledge Deficit  Goal: Patient/family/caregiver demonstrates understanding of disease process, treatment plan, medications, and discharge instructions  Description: Complete learning assessment and assess knowledge base    Interventions:  - Provide teaching at level of understanding  - Provide teaching via preferred learning methods  Outcome: Progressing     Problem: METABOLIC, FLUID AND ELECTROLYTES - ADULT  Goal: Electrolytes maintained within normal limits  Description: INTERVENTIONS:  - Monitor labs and assess patient for signs and symptoms of electrolyte imbalances  - Administer electrolyte replacement as ordered  - Monitor response to electrolyte replacements, including repeat lab results as appropriate  - Instruct patient on fluid and nutrition as appropriate  Outcome: Progressing  Goal: Fluid balance maintained  Description: INTERVENTIONS:  - Monitor labs   - Monitor I/O and WT  - Instruct patient on fluid and nutrition as appropriate  - Assess for signs & symptoms of volume excess or deficit  Outcome: Progressing  Goal: Glucose maintained within target range  Description: INTERVENTIONS:  - Monitor Blood Glucose as ordered  - Assess for signs and symptoms of hyperglycemia and hypoglycemia  - Administer ordered medications to maintain glucose within target range  - Assess nutritional intake and initiate nutrition service referral as needed  Outcome: Progressing     Problem: SKIN/TISSUE INTEGRITY - ADULT  Goal: Skin Integrity remains intact(Skin Breakdown Prevention)  Description: Assess:  -Perform [place] assessment every shift  -Clean and moisturize skin every shift  -Inspect skin when repositioning, toileting, and assisting with ADLS  -Assess extremities for adequate circulation and sensation     Bed Management:  -Have minimal linens on bed & keep smooth, unwrinkled  -Change linens as needed when moist or perspiring  -Avoid sitting or lying in one position for more than 2 hours while in bed  -Keep HOB at [address]:  -Offer bedside commode  -Assess for incontinence every 3 hours  -Use incontinent care products after each incontinent episode such as pad/condom cath    Activity:  -Mobilize patient 3 times a day  -Encourage activity and walks on unit  -Encourage or provide ROM exercises   -Turn and reposition patient every 2 Hours  -Use appropriate equipment to lift or move patient in bed  -Instruct/ Assist with weight shifting every 15 minutes when out of bed in chair  -Consider limitation of chair time 3 hour intervals    Skin Care:  -Avoid use of baby powder, tape, friction and shearing, hot water or constrictive clothing  -Relieve pressure over bony prominences using mepilex  -Do not massage red bony areas    Outcome: Progressing  Goal: Incision(s), wounds(s) or drain site(s) healing without S/S of infection  Description: INTERVENTIONS  - Assess and document dressing, incision, wound bed, drain sites and surrounding tissue  - Provide patient and family education  - Perform skin care/dressing changes every shift  Outcome: Progressing

## 2022-10-31 NOTE — PLAN OF CARE
Problem: Potential for Falls  Goal: Patient will remain free of falls  Description: INTERVENTIONS:  - Educate patient/family on patient safety including physical limitations  - Instruct patient to call for assistance with activity   - Consult OT/PT to assist with strengthening/mobility   - Keep Call bell within reach  - Keep bed low and locked with side rails adjusted as appropriate  - Keep care items and personal belongings within reach  - Initiate and maintain comfort rounds  - Make Fall Risk Sign visible to staff  - Offer Toileting every 2 Hours, in advance of need  - Initiate/Maintain alarm  - Obtain necessary fall risk management equipment  - Apply yellow socks and bracelet for high fall risk patients  - Consider moving patient to room near nurses station  Outcome: Progressing     Problem: PAIN - ADULT  Goal: Verbalizes/displays adequate comfort level or baseline comfort level  Description: Interventions:  - Encourage patient to monitor pain and request assistance  - Assess pain using appropriate pain scale  - Administer analgesics based on type and severity of pain and evaluate response  - Implement non-pharmacological measures as appropriate and evaluate response  - Consider cultural and social influences on pain and pain management  - Notify physician/advanced practitioner if interventions unsuccessful or patient reports new pain  Outcome: Progressing     Problem: INFECTION - ADULT  Goal: Absence or prevention of progression during hospitalization  Description: INTERVENTIONS:  - Assess and monitor for signs and symptoms of infection  - Monitor lab/diagnostic results  - Monitor all insertion sites, i e  indwelling lines, tubes, and drains  - Monitor endotracheal if appropriate and nasal secretions for changes in amount and color  - [place] appropriate cooling/warming therapies per order  - Administer medications as ordered  - Instruct and encourage patient and family to use good hand hygiene technique  - Identify and instruct in appropriate isolation precautions for identified infection/condition  Outcome: Progressing     Problem: SAFETY ADULT  Goal: Patient will remain free of falls  Description: INTERVENTIONS:  - Educate patient/family on patient safety including physical limitations  - Instruct patient to call for assistance with activity   - Consult OT/PT to assist with strengthening/mobility   - Keep Call bell within reach  - Keep bed low and locked with side rails adjusted as appropriate  - Keep care items and personal belongings within reach  - Initiate and maintain comfort rounds  - Make Fall Risk Sign visible to staff  - Offer Toileting every 2 Hours, in advance of need  - Initiate/Maintain alarm  - Obtain necessary fall risk management equipment  - Apply yellow socks and bracelet for high fall risk patients  - Consider moving patient to room near nurses station  Outcome: Progressing

## 2022-10-31 NOTE — PROGRESS NOTES
Progress Note - Nephrology   Ange Vang 57 y o  male MRN: [id number]  Unit/Bed#: -01 Encounter: [id number]    A/P:  1  Chronic kidney disease stage 4 with baseline creatinine between 2 2-2 4 mg/dL   Kidney function stable, continue to monitor  Continue avoid potential nephrotoxins and optimize care in general   2  Proteinuria   Potentially due to diabetic nephropathy, would reassess in the outpatient setting when he is in his usual state health  Patient may be candidate for an SGLT -2 inhibitor, however he is borderline due to advanced chronic kidney disease at this time  Patient was on losartan in the outpatient setting, is currently on hold  Patient should be ruled out for monoclonal once he is back to his usual state health in the outpatient setting  3  Probable underlying diabetic nephropathy   Optimize care especially once the patient returns to his usual state health  4  Hypertension the setting of chronic kidney disease stage 4   Continue current medications, blood pressures currently appropriate  5  Osteomyelitis of the right foot   Plan for surgical intervention on November 2nd, patient to be optimized from the renal perspective  Follow up reason for today's visit:  Chronic kidney disease stage 4/proteinuria/diabetic nephropathy/hypertension    Sepsis [facility])    Patient Active Problem List   Diagnosis   • Osteomyelitis of foot, right, acute ([facility] [place] [address] )   • Sepsis ([facility] [place] [address] )   • Acquired hypothyroidism   • Stage 4 chronic kidney disease ([facility] [place] [address] )   • Essential hypertension   • Chronic anemia   • Type 2 diabetes mellitus with circulatory disorder, without long-term current use of insulin ([facility])         Subjective:   Patient is eating and drinking with no nausea vomiting at this time  Objective:     Vitals: Blood pressure 119/72, pulse 64, temperature 98 4 °F (36 9 °C), resp  rate 18, height 5' 10" (1 778 m), weight 112 kg (247 lb 2 2 oz), SpO2 97 %  ,Body mass index is 35 46 kg/m²      Weight (last 2 days)     Date/Time Weight    10/29/22 23:44:56 112 (247 14)    10/29/22 2010 109 (240)            Intake/Output Summary (Last 24 hours) at 10/31/2022 1539  Last data filed at 10/31/2022 1436  Gross per 24 hour   Intake 900 ml   Output 2675 ml   Net -1775 ml     I/O last 3 completed shifts: In: 1 [P O :1200; IV Piggyback:2240]  Out: 1850 [Urine:1850]         Physical Exam: /72   Pulse 64   Temp 98 4 °F (36 9 °C)   Resp 18   Ht 5' 10" (1 778 m)   Wt 112 kg (247 lb 2 2 oz)   SpO2 97%   BMI 35 46 kg/m²     General Appearance:    Alert, cooperative, no distress, appears stated age   Head:    Normocephalic, without obvious abnormality, atraumatic   Eyes:    Conjunctiva/corneas clear   Ears:    Normal external ears   Nose:   Nares normal, septum midline, mucosa normal, no drainage    or sinus tenderness   Throat:   Lips, mucosa, and tongue normal; teeth and gums normal   Neck:   Supple   Back:     Symmetric, no curvature, ROM normal, no CVA tenderness   Lungs:     Clear to auscultation bilaterally, respirations unlabored   Chest wall:    No tenderness or deformity   Heart:    Regular rate and rhythm, S1 and S2 normal, no murmur, rub   or gallop   Abdomen:     Soft, non-tender, bowel sounds active   Extremities:   Extremities normal, atraumatic, no cyanosis, mild bilateral lower extremity edema   Skin:   Skin color, texture, turgor normal, no rashes or lesions   Lymph nodes:   Cervical normal   Neurologic:   CNII-XII intact            Lab, Imaging and other studies: I have personally reviewed pertinent labs    CBC:   Lab Results   Component Value Date    WBC 10 49 (H) 10/31/2022    HGB 9 3 (L) 10/31/2022    HCT 28 4 (L) 10/31/2022     (H) 10/31/2022     10/31/2022    MCH 33 9 10/31/2022    MCHC 32 7 10/31/2022    RDW 12 3 10/31/2022    MPV 9 9 10/31/2022     CMP:   Lab Results   Component Value Date    K 4 5 10/31/2022     10/31/2022    CO2 25 10/31/2022    BUN 30 (H) 10/31/2022 CREATININE 2 28 (H) 10/31/2022    CALCIUM 8 3 10/31/2022    EGFR 29 10/31/2022         Results from last 7 days   Lab Units 10/31/22  0502 10/30/22  0552 10/29/22  2032   POTASSIUM mmol/L 4 5 4 5 4 6   CHLORIDE mmol/L 106 106 101   CO2 mmol/L 25 25 25   BUN mg/dL 30* 27* 28*   CREATININE mg/dL 2 28* 2 22* 2 42*   CALCIUM mg/dL 8 3 8 0* 8 5   ALK PHOS U/L  --   --  67   ALT U/L  --   --  30   AST U/L  --   --  16         Phosphorus: No results found for: PHOS  Magnesium: No results found for: MG  Urinalysis: No results found for: COLORU, CLARITYU, SPECGRAV, PHUR, LEUKOCYTESUR, NITRITE, PROTEINUA, GLUCOSEU, KETONESU, BILIRUBINUR, BLOODU  Ionized Calcium: No results found for: CAION  Coagulation: No results found for: PT, INR, APTT  Troponin: No results found for: TROPONINI  ABG: No results found for: PHART, BBP2UCT, PO2ART, BPN3IPN, C8PNRFNC, BEART, SOURCE  Radiology review:     IMAGING  Procedure: XR foot 2 views RIGHT    Result Date: 10/30/2022  Narrative: RIGHT FOOT INDICATION:   Diabetic foot wound, rule out osteomyelitis  COMPARISON:  None VIEWS:  XR FOOT 2 VW RIGHT FINDINGS: Partially imaged fixation hardware at the ankle  There is no acute fracture or dislocation  Mild hallux valgus with mild 1st MTP degenerative changes  No lytic or blastic osseous lesion  Planter ulcer along the forefoot  Impression: No sign of osteomyelitis  Workstation performed: [id number]     Procedure: MRI foot/forefoot toes right wo contrast    Result Date: 10/31/2022  Narrative: MRI FOOT/FOREFOOT TOES RIGHT WO CONTRAST INDICATION:   Right foot infection  COMPARISON: Correlation is made with the prior radiograph dated 10/29/2022  TECHNIQUE:  MR sequences were obtained of the right foot including the following:  Localizer, sagittal T1/STIR, coronal T1/T2 fat/ sat/STIR, axial T2 fat sat/STIR/PD  Images were acquired on a 1 5 Geeta unit   Gadolinium was not used FINDINGS: SUBCUTANEOUS TISSUES: There is ulceration along the tip of the 2nd toe with surrounding cellulitis  No well-defined abscess seen  There is a plantar surface ulceration along the forefoot at the level of the 3rd MTP joint  There is surrounding cellulitis without discrete abscess  There is dorsal subcutaneous edema  BONES:  There is patchy decreased T1 and increased T2 signal through the distal half of the 2nd toe distal phalanx consistent with osteomyelitis  There is minimal marrow edema within the 3rd toe proximal phalanx without confluent decreased T1 marrow signal to suggest osteomyelitis  This minimal marrow edema is nonspecific  FIRST MTP JOINT:  Mild osteoarthrosis with mild hallux valgus  SESAMOID BONES:  Intact  OTHER ARTICULAR SURFACE:  There is a small joint effusion seen within the 3rd and 4th MTP joints  PLANTAR FASCIA:  Intact  LISFRANC LIGAMENT:  Intact  FOREFOOT TENDONS: Intact  INTERMETATARSAL REGIONS: No bursitis or Alicea's neuroma  MUSCULATURE: There is prominent fatty atrophy of the tarsal muscles  Impression: Ulceration tip of the 2nd toe with osteomyelitis involving the distal half of the 2nd toe distal phalanx  Plantar soft tissue ulcer along the forefoot at the level of the 3rd MTP joint  There is minimal marrow edema within the 3rd proximal phalanx, nonspecific without confluent decreased T1 marrow signal to definitively suggest osteomyelitis  Small joint effusions seen within the 3rd and 4th MTP joints  Mild 1st MTP joint osteoarthrosis and moderate hallux valgus  Prominent fatty atrophy of the tarsal muscles   Workstation performed: [id number]       Current Facility-Administered Medications   Medication Dose Route Frequency   • allopurinol (ZYLOPRIM) tablet 100 mg  100 mg Oral Daily   • aspirin (ECOTRIN LOW STRENGTH) EC tablet 81 mg  81 mg Oral Daily   • ceFAZolin (ANCEF) IVPB (premix in dextrose) 2,000 mg 50 mL  2,000 mg Intravenous Q8H   • heparin (porcine) subcutaneous injection 5,000 Units  5,000 Units Subcutaneous Q8H [address]   • HYDROcodone-acetaminophen (NORCO) 5-325 mg per tablet 1 tablet  1 tablet Oral Q6H PRN   • insulin lispro (HumaLOG) 100 units/mL subcutaneous injection 1-6 Units  1-6 Units Subcutaneous TID AC   • insulin lispro (HumaLOG) 100 units/mL subcutaneous injection 1-6 Units  1-6 Units Subcutaneous HS   • levothyroxine tablet 150 mcg  150 mcg Oral Early Morning   • metoprolol succinate (TOPROL-XL) 24 hr tablet 100 mg  100 mg Oral Daily   • metroNIDAZOLE (FLAGYL) IVPB (premix) 500 mg 100 mL  500 mg Intravenous Q8H   • pantoprazole (PROTONIX) EC tablet 40 mg  40 mg Oral Early Morning   • vancomycin (VANCOCIN) 1,750 mg in sodium chloride 0 9 % 500 mL IVPB  20 mg/kg (Adjusted) Intravenous Q24H     Medications Discontinued During This Encounter   Medication Reason   • atenolol (TENORMIN) 50 mg tablet    • influenza vaccine, recombinant, quadrivalent (FLUBLOK) IM injection 0 5 mL        Kym Biggs      This progress note was produced in part using a dictation device which may document imprecise wording from author's original intent

## 2022-11-01 ENCOUNTER — ANESTHESIA EVENT (INPATIENT)
Dept: PERIOP | Facility: HOSPITAL | Age: 62
End: 2022-11-01

## 2022-11-01 LAB
ANION GAP SERPL CALCULATED.3IONS-SCNC: 8 MMOL/L (ref 4–13)
BACTERIA WND AEROBE CULT: ABNORMAL
BACTERIA WND AEROBE CULT: ABNORMAL
BUN SERPL-MCNC: 30 MG/DL (ref 5–25)
CALCIUM SERPL-MCNC: 9 MG/DL (ref 8.3–10.1)
CHLORIDE SERPL-SCNC: 103 MMOL/L (ref 96–108)
CO2 SERPL-SCNC: 27 MMOL/L (ref 21–32)
CREAT SERPL-MCNC: 2.18 MG/DL (ref 0.6–1.3)
ERYTHROCYTE [DISTWIDTH] IN BLOOD BY AUTOMATED COUNT: 12.4 % (ref 11.6–15.1)
GFR SERPL CREATININE-BSD FRML MDRD: 30 ML/MIN/1.73SQ M
GLUCOSE SERPL-MCNC: 118 MG/DL (ref 65–140)
GLUCOSE SERPL-MCNC: 119 MG/DL (ref 65–140)
GLUCOSE SERPL-MCNC: 152 MG/DL (ref 65–140)
GLUCOSE SERPL-MCNC: 168 MG/DL (ref 65–140)
GLUCOSE SERPL-MCNC: 202 MG/DL (ref 65–140)
GRAM STN SPEC: ABNORMAL
HCT VFR BLD AUTO: 29.6 % (ref 36.5–49.3)
HGB BLD-MCNC: 9.7 G/DL (ref 12–17)
MAGNESIUM SERPL-MCNC: 1.9 MG/DL (ref 1.6–2.6)
MCH RBC QN AUTO: 33.7 PG (ref 26.8–34.3)
MCHC RBC AUTO-ENTMCNC: 32.8 G/DL (ref 31.4–37.4)
MCV RBC AUTO: 103 FL (ref 82–98)
PLATELET # BLD AUTO: 205 THOUSANDS/UL (ref 149–390)
PMV BLD AUTO: 9.2 FL (ref 8.9–12.7)
POTASSIUM SERPL-SCNC: 4.5 MMOL/L (ref 3.5–5.3)
RBC # BLD AUTO: 2.88 MILLION/UL (ref 3.88–5.62)
SODIUM SERPL-SCNC: 138 MMOL/L (ref 135–147)
WBC # BLD AUTO: 6.92 THOUSAND/UL (ref 4.31–10.16)

## 2022-11-01 RX ORDER — MAGNESIUM HYDROXIDE/ALUMINUM HYDROXICE/SIMETHICONE 120; 1200; 1200 MG/30ML; MG/30ML; MG/30ML
30 SUSPENSION ORAL EVERY 4 HOURS PRN
Status: DISCONTINUED | OUTPATIENT
Start: 2022-11-01 | End: 2022-11-03 | Stop reason: HOSPADM

## 2022-11-01 RX ORDER — CEFEPIME HYDROCHLORIDE 2 G/50ML
2000 INJECTION, SOLUTION INTRAVENOUS EVERY 12 HOURS
Status: DISCONTINUED | OUTPATIENT
Start: 2022-11-01 | End: 2022-11-02

## 2022-11-01 RX ORDER — ONDANSETRON 2 MG/ML
4 INJECTION INTRAMUSCULAR; INTRAVENOUS ONCE
Status: COMPLETED | OUTPATIENT
Start: 2022-11-01 | End: 2022-11-01

## 2022-11-01 RX ORDER — HEPARIN SODIUM 5000 [USP'U]/ML
5000 INJECTION, SOLUTION INTRAVENOUS; SUBCUTANEOUS EVERY 8 HOURS SCHEDULED
Status: COMPLETED | OUTPATIENT
Start: 2022-11-01 | End: 2022-11-01

## 2022-11-01 RX ORDER — ONDANSETRON 2 MG/ML
4 INJECTION INTRAMUSCULAR; INTRAVENOUS EVERY 4 HOURS PRN
Status: DISCONTINUED | OUTPATIENT
Start: 2022-11-01 | End: 2022-11-01

## 2022-11-01 RX ORDER — ECHINACEA PURPUREA EXTRACT 125 MG
1 TABLET ORAL EVERY 4 HOURS PRN
Status: DISCONTINUED | OUTPATIENT
Start: 2022-11-01 | End: 2022-11-03 | Stop reason: HOSPADM

## 2022-11-01 RX ORDER — LORAZEPAM 2 MG/ML
0.5 INJECTION INTRAMUSCULAR ONCE
Status: DISCONTINUED | OUTPATIENT
Start: 2022-11-01 | End: 2022-11-01

## 2022-11-01 RX ADMIN — HEPARIN SODIUM 5000 UNITS: 5000 INJECTION INTRAVENOUS; SUBCUTANEOUS at 13:45

## 2022-11-01 RX ADMIN — CEFEPIME HYDROCHLORIDE 2000 MG: 2 INJECTION, SOLUTION INTRAVENOUS at 13:45

## 2022-11-01 RX ADMIN — METRONIDAZOLE 500 MG: 500 INJECTION, SOLUTION INTRAVENOUS at 00:09

## 2022-11-01 RX ADMIN — INSULIN LISPRO 1 UNITS: 100 INJECTION, SOLUTION INTRAVENOUS; SUBCUTANEOUS at 21:32

## 2022-11-01 RX ADMIN — HEPARIN SODIUM 5000 UNITS: 5000 INJECTION INTRAVENOUS; SUBCUTANEOUS at 05:34

## 2022-11-01 RX ADMIN — METOPROLOL SUCCINATE 100 MG: 100 TABLET, EXTENDED RELEASE ORAL at 08:26

## 2022-11-01 RX ADMIN — ALLOPURINOL 100 MG: 100 TABLET ORAL at 08:26

## 2022-11-01 RX ADMIN — HEPARIN SODIUM 5000 UNITS: 5000 INJECTION INTRAVENOUS; SUBCUTANEOUS at 21:32

## 2022-11-01 RX ADMIN — CEFAZOLIN SODIUM 2000 MG: 2 SOLUTION INTRAVENOUS at 05:34

## 2022-11-01 RX ADMIN — PANTOPRAZOLE SODIUM 40 MG: 40 TABLET, DELAYED RELEASE ORAL at 05:34

## 2022-11-01 RX ADMIN — METRONIDAZOLE 500 MG: 500 INJECTION, SOLUTION INTRAVENOUS at 08:26

## 2022-11-01 RX ADMIN — ASPIRIN 81 MG: 81 TABLET, COATED ORAL at 08:27

## 2022-11-01 RX ADMIN — METRONIDAZOLE 500 MG: 500 INJECTION, SOLUTION INTRAVENOUS at 23:25

## 2022-11-01 RX ADMIN — METRONIDAZOLE 500 MG: 500 INJECTION, SOLUTION INTRAVENOUS at 15:58

## 2022-11-01 RX ADMIN — ONDANSETRON 4 MG: 2 INJECTION INTRAMUSCULAR; INTRAVENOUS at 09:26

## 2022-11-01 RX ADMIN — INSULIN LISPRO 2 UNITS: 100 INJECTION, SOLUTION INTRAVENOUS; SUBCUTANEOUS at 11:34

## 2022-11-01 RX ADMIN — LEVOTHYROXINE SODIUM 150 MCG: 150 TABLET ORAL at 05:34

## 2022-11-01 NOTE — ASSESSMENT & PLAN NOTE
· Secondary to cellulitis of foot-suspected osteomyelitis  · Continue IV antibiotic  · Podiatry consult appreciated   · Plan for R 2nd toe partial amputation 11/2 - see more under osteomyelitis   · Follow ID recommendation  · Final abx determined by surgical cure   · Dc vanc and cefazolin and switch to cefepime 2g q 12 hours   · Continue flagyl 500 tid

## 2022-11-01 NOTE — PLAN OF CARE
Problem: PAIN - ADULT  Goal: Verbalizes/displays adequate comfort level or baseline comfort level  Description: Interventions:  - Encourage patient to monitor pain and request assistance  - Assess pain using appropriate pain scale  - Administer analgesics based on type and severity of pain and evaluate response  - Implement non-pharmacological measures as appropriate and evaluate response  - Consider cultural and social influences on pain and pain management  - Notify physician/advanced practitioner if interventions unsuccessful or patient reports new pain  Outcome: Progressing     Problem: SAFETY ADULT  Goal: Patient will remain free of falls  Description: INTERVENTIONS:  - Educate patient/family on patient safety including physical limitations  - Instruct patient to call for assistance with activity   - Consult OT/PT to assist with strengthening/mobility   - Keep Call bell within reach  - Keep bed low and locked with side rails adjusted as appropriate  - Keep care items and personal belongings within reach  - Initiate and maintain comfort rounds  - Make Fall Risk Sign visible to staff  - Offer Toileting every 2 Hours, in advance of need  - Initiate/Maintain bed/chair alarm  - Obtain necessary fall risk management equipment:   - Apply yellow socks and bracelet for high fall risk patients  - Consider moving patient to room near nurses station  Outcome: Progressing

## 2022-11-01 NOTE — CASE MANAGEMENT
Case Management Assessment & Discharge Planning Note    Patient name Alaina Diallo  Location /-68 MRN [id number]  : 1960 Date 2022       Current Admission Date: 10/29/2022  Current Admission Diagnosis:Sepsis [facility])   Patient Active Problem List    Diagnosis Date Noted   • Osteomyelitis of foot, right, acute ([facility] ) 10/30/2022   • Sepsis ([facility] ) 10/30/2022   • Acquired hypothyroidism 10/30/2022   • Stage 4 chronic kidney disease ([facility] ) 10/30/2022   • Essential hypertension 10/30/2022   • Chronic anemia 10/30/2022   • Type 2 diabetes mellitus with circulatory disorder, without long-term current use of insulin ([place] [address] ) 10/30/2022      LOS (days): 3  Geometric Mean LOS (GMLOS) (days):   Days to GMLOS:     OBJECTIVE:    Risk of Unplanned Readmission Score: 16 29         Current admission status: Inpatient  Referral Reason:  (discharge planning  homehealth assistance)    Preferred Pharmacy:   [facility]/pharmacy #[id number] [address]  Phone: [phone] Fax: [phone]    Primary Care Provider: Marilou Nathan MD    Primary Insurance: [address] [place] [address]  Secondary Insurance:     ASSESSMENT:    CM met with patient at the bedside,baseline information  was obtained  CM discussed the role of CM in helping the patient develop a discharge plan and assist the patient in carry out their plan  Patient stated he lives his wife Nicoleen [place] who assist with wound care at home    Patient stated he follows with [address] [facility] wound clinic weekly under Dr Christian Villagomez care for right foot wound    Jennifer 26 Proxies    There are no active Health Care Proxies on file         Advance Directives  Does patient have a [address]?: No  Was patient offered paperwork?: Yes (declined)  Does patient currently have a Health Care decision maker?: Yes, please see Health Care Proxy section Michell Latham spouse)  Does patient have Advance Directives?: No  Was patient offered paperwork?: Yes (declined)  Primary Contact: Katha Meigs Spouse         Readmission Root Cause  30 Day Readmission: No    Patient Information  Admitted from[de-identified] Home  Mental Status: Alert  During Assessment patient was accompanied by: Not accompanied during assessment  Assessment information provided by[de-identified] Patient  Primary Caregiver: Spouse  Caregiver's Name[de-identified] Danya Hernandez Relationship to Patient[de-identified] Significant Other  Caregiver's Telephone Number[de-identified] [address] of Residence: One [facility] do you live in?: [facility] entry access options   Select all that apply : Stairs  Number of steps to enter home : 2  Do the steps have railings?: Yes  Type of Current Residence: 2 story home  Upon entering residence, is there a bedroom on the main floor (no further steps)?: No  A bedroom is located on the following floor levels of residence (select all that apply):: 2nd Floor  Upon entering residence, is there a bathroom on the main floor (no further steps)?: Yes  Number of steps to 2nd floor from main floor: One Flight (with rail)  In the last 12 months, was there a time when you were not able to pay the mortgage or rent on time?: No  In the last 12 months, how many places have you lived?: 1  In the last 12 months, was there a time when you did not have a steady place to sleep or slept in a shelter (including now)?: No  Homeless/housing insecurity resource given?: N/A  Living Arrangements: Lives w/ Spouse/significant other  Is patient a ?: No    Activities of Daily Living Prior to Admission  Functional Status: Independent  Completes ADLs independently?: Yes  Ambulates independently?: Yes  Does patient use assisted devices?: Yes  Assisted Devices (DME) used: Straight Cane (:[employer]"   wearing off loading boot)  Does patient currently own DME?: Yes  What DME does the patient currently own?: Straight Cane  Does patient have a history of Outpatient Therapy (PT/OT)?: No  Does the patient have a history of Short-Term Rehab?: No  Does patient have a history of HHC?: No  Does patient currently have Sana Rapp?: No         Patient Information Continued  Income Source: Employed  Does patient have prescription coverage?: Yes  Within the past 12 months, you worried that your food would run out before you got the money to buy more : Never true  Within the past 12 months, the food you bought just didn't last and you didn't have money to get more : Never true  Food insecurity resource given?: N/A  Does patient receive dialysis treatments?: No         Means of Transportation  Means of Transport to Appts[de-identified] Drives Self  In the past 12 months, has lack of transportation kept you from medical appointments or from getting medications?: No  In the past 12 months, has lack of transportation kept you from meetings, work, or from getting things needed for daily living?: No  Was application for public transport provided?: N/A        DISCHARGE DETAILS:    Discharge planning discussed with[de-identified] patient  Freedom of Choice: Yes  Comments - Freedom of Choice: CM discussed FOC for recommended  home health services post hospital  Patient is declining home health services  Patient stated he is going to [address] [place] [address] wound clinic and is under   Dr Janelle Mercado care  weekly  CM informed patient if he changes his mind to let CM know and we can make referrals for home health after hospitalization  patient verbalized understanding   Patient stated he has been using a off loading boot to his right foot    CM contacted family/caregiver?: Yes  Were Treatment Team discharge recommendations reviewed with patient/caregiver?: Yes  Did patient/caregiver verbalize understanding of patient care needs?: Yes  Were patient/caregiver advised of the risks associated with not following Treatment Team discharge recommendations?: Yes    Contacts  Patient Contacts: wife Katherine Hurtado  Relationship to Patient[de-identified] Family  Reason/Outcome: Discharge Planning    Requested Home Health Care         Is the patient interested in [facility] at discharge?: No (declined at this time)      CM discussed Consult for home health services post hospitalization  Patient stated he is active wears off loading shoe to right foot  Patient is declining Home Health on discharge stated his wife assist with wound care and he is follow up with [facility] wound clinic under Dr Zack Marrero care weekly  CM as patient to reconsider Home health along with wound clinic for wound healing  Patient still declined but will let CM know if he changes his mind      CM to follow

## 2022-11-01 NOTE — PROGRESS NOTES
[address]  Progress Note - Karma Arrieta 1960, 57 y o  male MRN: [id number]  Unit/Bed#: -Guillermo Encounter: [id number]  Primary Care Provider: Cathy Garvey MD   Date and time admitted to hospital: 10/29/2022  8:12 PM    * Sepsis [facility])  Assessment & Plan  · Secondary to cellulitis of foot-suspected osteomyelitis  · Continue IV antibiotic  · Podiatry consult appreciated   · Plan for R 2nd toe partial amputation 11/2 - see more under osteomyelitis   · Follow ID recommendation  · Final abx determined by surgical cure   · Dc vanc and cefazolin and switch to cefepime 2g q 12 hours   · Continue flagyl 500 tid     Type 2 diabetes mellitus with circulatory disorder, without long-term current use of insulin [facility])  Assessment & Plan  Lab Results   Component Value Date    HGBA1C 6 5 (H) 08/17/2022       Recent Labs     10/31/22  1553 10/31/22  2126 11/01/22  0726 11/01/22  1055   POCGLU 104 131 119 202*       Blood Sugar Average: Last 72 hrs:  (P) 138     · Glipizide on hold with CKD 4  ·  sliding scale insulin coverage with Accu-Cheks  · Carbohydrate controlled diet  · Hypoglycemia protocol    Chronic anemia  Assessment & Plan  · Chronic anemia  · Hemoglobin 11 1 on admission appears baseline  · Probable secondary to CKD 4  · Monitor closely    Lab Results   Component Value Date    HGB 9 3 (L) 10/31/2022    HGB 9 7 (L) 10/30/2022    HGB 11 1 (L) 10/29/2022         Essential hypertension  Assessment & Plan  · Continue PTA metoprolol succinate 100 mg daily  · PTA losartan on hold due to CKD 4  · Trend blood pressures    Stage 4 chronic kidney disease [facility])  Assessment & Plan  Lab Results   Component Value Date    EGFR 29 10/31/2022    EGFR 30 10/30/2022    EGFR 27 10/29/2022    CREATININE 2 28 (H) 10/31/2022    CREATININE 2 22 (H) 10/30/2022    CREATININE 2 42 (H) 10/29/2022     · Review of care everywhere, patient has CKD with creatinine > 2 since 2020  · Creatinine at baseline on admission- history CKD 4  · Referred to Nephrology by PCP but declined  · Follow nephrology recommends   · Trend creatinine  · Renally dose medications  · PTA losartan on hold      Acquired hypothyroidism  Assessment & Plan  · Continue PTA levothyroxine 150 mcg daily    Osteomyelitis of foot, right, acute (HCC)  Assessment & Plan  · Infected diabetic plantar ulcer  · Plantar wound present for 1 month and has been followed by [facility] outpatient  · Vascular duplex obtained bilaterally on 10/13/2022 without evidence of significant stenosis  · Completed ABX course of Keflex as OP  · Developed acute pain, fever, chills, redness and swelling of right foot on 10/28  · Presents with significant edema/erythema of right foot, 2nd and 3rd digits with sausage like appearance  · Leukocytosis, elevated inflammatory markers  · X-ray right foot- bony erosion of distal 2nd digit  · MRI of the foot shows osteomyelitis  · Podiatry consult appreciated-plan is to do surgery on 11/02/22-patient is to be NPO after midnight, and also hold heparin after night dose on 11/01/2022  · Patient remained medically optimize for upcoming podiatry procedure, patient has 2 points on revised cardiac risk index for preoperative risk  · Final abx course determined by surgery - for now continue abx as above       VTE Pharmacologic Prophylaxis: VTE Score: 5 High Risk (Score >/= 5) - Pharmacological DVT Prophylaxis Ordered: heparin  Sequential Compression Devices Ordered  Patient Centered Rounds: I performed bedside rounds with nursing staff today  Discussions with Specialists or Other Care Team Provider: NANCY    Education and Discussions with Family / Patient: Patient declined call to   Time Spent for Care: 30 minutes  More than 50% of total time spent on counseling and coordination of care as described above      Current Length of Stay: 3 day(s)  Current Patient Status: Inpatient   Certification Statement: The patient will continue to require additional inpatient hospital stay due to Planning surgery with podiatry tomorrow   Discharge Plan: Anticipate discharge in 48-72 hrs to discharge location to be determined pending rehab evaluations  Code Status: Level 1 - Full Code    Subjective:   Seen and examined  Feeling okay, no pain  Does have some congestion and requesting saline spray  Objective:     Vitals:   Temp (24hrs), Av 7 °F (37 1 °C), Min:98 1 °F (36 7 °C), Max:99 3 °F (37 4 °C)    Temp:  [98 1 °F (36 7 °C)-99 3 °F (37 4 °C)] 98 1 °F (36 7 °C)  HR:  [66-81] 74  Resp:  [14-18] 14  BP: (122-149)/(62-81) 149/81  SpO2:  [94 %-99 %] 95 %  Body mass index is 35 46 kg/m²  Input and Output Summary (last 24 hours): Intake/Output Summary (Last 24 hours) at 2022 1345  Last data filed at 2022 1135  Gross per 24 hour   Intake 960 ml   Output 4625 ml   Net -3665 ml       Physical Exam:   Physical Exam  Vitals and nursing note reviewed  Constitutional:       General: He is not in acute distress  Appearance: Normal appearance  HENT:      Head: Normocephalic and atraumatic  Nose: No congestion  Mouth/Throat:      Mouth: Mucous membranes are moist    Eyes:      Conjunctiva/sclera: Conjunctivae normal    Cardiovascular:      Rate and Rhythm: Normal rate and regular rhythm  Pulses: Normal pulses  Heart sounds: Normal heart sounds  No murmur heard  Pulmonary:      Effort: Pulmonary effort is normal  No respiratory distress  Breath sounds: Normal breath sounds  Abdominal:      General: Bowel sounds are normal       Palpations: Abdomen is soft  Tenderness: There is no abdominal tenderness  Musculoskeletal:         General: Normal range of motion  Right lower leg: No edema  Left lower leg: No edema  Skin:     General: Skin is warm and dry  Comments: Wound dressed   Neurological:      Mental Status: He is alert and oriented to person, place, and time            Additional Data: Labs:  Results from last 7 days   Lab Units 10/31/22  0502 10/30/22  0552   WBC Thousand/uL 10 49* 12 20*   HEMOGLOBIN g/dL 9 3* 9 7*   HEMATOCRIT % 28 4* 29 4*   PLATELETS Thousands/uL 179 173   NEUTROS PCT %  --  68   LYMPHS PCT %  --  17   MONOS PCT %  --  13*   EOS PCT %  --  1     Results from last 7 days   Lab Units 10/31/22  0502 10/30/22  0552 10/29/22  2032   SODIUM mmol/L 137   < > 136   POTASSIUM mmol/L 4 5   < > 4 6   CHLORIDE mmol/L 106   < > 101   CO2 mmol/L 25   < > 25   BUN mg/dL 30*   < > 28*   CREATININE mg/dL 2 28*   < > 2 42*   ANION GAP mmol/L 6   < > 10   CALCIUM mg/dL 8 3   < > 8 5   ALBUMIN g/dL  --   --  3 3*   TOTAL BILIRUBIN mg/dL  --   --  0 99   ALK PHOS U/L  --   --  67   ALT U/L  --   --  30   AST U/L  --   --  16   GLUCOSE RANDOM mg/dL 121   < > 151*    < > = values in this interval not displayed  Results from last 7 days   Lab Units 11/01/22  1055 11/01/22  0726 10/31/22  2126 10/31/22  1553 10/31/22  1051 10/31/22  0711 10/30/22  2054 10/30/22  1557 10/30/22  1112 10/30/22  0732 10/29/22  2343   POC GLUCOSE mg/dl 202* 119 131 104 145* 105 167* 162* 172* 85 126         Results from last 7 days   Lab Units 10/29/22  2032   LACTIC ACID mmol/L 1 2   PROCALCITONIN ng/ml 0 27*       Lines/Drains:  Invasive Devices  Report    Peripheral Intravenous Line  Duration           Peripheral IV 10/31/22 Distal;Left;Ventral (anterior) Forearm 1 day                      Imaging: Reviewed radiology reports from this admission including: xray(s) and MRI    Recent Cultures (last 7 days):   Results from last 7 days   Lab Units 10/29/22  2032   BLOOD CULTURE  No Growth at 24 hrs  No Growth at 24 hrs     GRAM STAIN RESULT  4+ Gram positive cocci in pairs and chains*  4+ Gram negative coccobacilli*  No polys seen*   WOUND CULTURE  4+ Growth of Beta Hemolytic Streptococcus Group G*  1+ Growth of Gram Negative Miko*       Last 24 Hours Medication List:   Current Facility-Administered Medications   Medication Dose Route Frequency Provider Last Rate   • allopurinol  100 mg Oral Daily Yolanda S Sudhir, CRNP     • aluminum-magnesium hydroxide-simethicone  30 mL Oral Q4H PRN Pavan Morales PA-C     • aspirin  81 mg Oral Daily Yolanda S Sudhir, CRNP     • cefepime  2,000 mg Intravenous Q12H Pavan Morales PA-C     • heparin (porcine)  5,000 Units Subcutaneous [facility] Pavan Morales PA-C     • HYDROcodone-acetaminophen  1 tablet Oral Q6H PRN Yolanda S Sudhir, CRNP     • insulin lispro  1-6 Units Subcutaneous TID AC Yolanda S Sudhir, CRNP     • insulin lispro  1-6 Units Subcutaneous HS Yolanda S Sudhir, CRNP     • levothyroxine  150 mcg Oral Early Morning Yolanda S Sudhir, CRNP     • metoprolol succinate  100 mg Oral Daily Yolanda S Sudhir, CRNP     • metroNIDAZOLE  500 mg Intravenous Q8H Yolanda S Sudhir, CRNP 500 mg (11/01/22 4929)   • pantoprazole  40 mg Oral Early Morning Yolanda S Sudhir, CRNP     • sodium chloride  1 spray Each Nare Q4H PRN Pavan Morales PA-C          Today, Patient Was Seen By: Pavan Morales    **Please Note: This note may have been constructed using a voice recognition system  **

## 2022-11-01 NOTE — CONSULTS
Consultation - Infectious Disease   Gilbert Salazar 57 y o  male MRN: [id number]  Unit/Bed#: -01 Encounter: [id number]      Inpatient consult to Infectious Diseases  Consult performed by: Emmanuel Argueta MD  Consult ordered by: Jenifer Green MD            REQUIRED DOCUMENTATION:     1  This service was provided via Telemedicine  2  Provider located at [facility]  3  TeleMed provider: Emmanuel Argueta MD   4  Identify all parties in room with patient during tele consult:RN  5  After connecting through [employer], patient was identified by name and date of birth and assistant checked wristband  Patient was then informed that this was a Telemedicine visit and that the exam was being conducted confidentially over secure lines  My office door was closed  No one else was in the room  Patient acknowledged consent and understanding of privacy and security of the Telemedicine visit, and gave us permission to have the assistant stay in the room in order to assist with the history and to conduct the exam   I informed the patient that I have reviewed their record in Epic and presented the opportunity for them to ask any questions regarding the visit today  The patient agreed to participate  Assessment/Recommendations     1  Sepsis, present on admission  - fever, tachycardia, leukocytosis  - Source of sepsis is right diabetic foot infection  - Clinically improving, afebrile without leukocytosis    · Management as below    2  Right foot cellulitis  - in the setting of chronic 2nd toe and midfoot ulcer  - MRI with distal toe osteo but no definitive osteo in the midfoot  - wound cx with Group G strep, gram negative chetan  - clinically mildly improving    · Discontinue vanc, cefazolin and switch to cefepime 1q12  · Continue Flagyl 500 t i d   · Follow-up blood and wound cultures  · Final choice and duration of antibiotics to be determined    3   Right distal phalangeal osteo  - suspected clinically with probe to bone and on MRI  - contiguous infection from overlying ulcer  - LEADs wnl    · Await Podiatry evaluation, recommend amputation of distal phalanx for surgical cure  · Final choice and duration of antibiotics to be determined by operative course    4  Type 2 diabetes, neuropathy  - A1C 6 5  - Risk factor for infection, poor wound healing    · Recommend strict glycemic control to aid with wound healing    5  CKD stage 4  - creatinine stable    · Continue renal dosing of abx    Thank you for involving me in the care of your patient  Please call with questions, change in clinical status or if tests recommended above are abnormal      Discussed with the primary service  History     Reason for Consult:  Diabetic foot infection  HPI: Johana Wu is a 57y o  year old male with type 2 diabetes, neuropathy and a chronic right mid foot and 2nd toe ulcer followed by Podiatry  Recent x-ray imaging and arterial Dopplers were unremarkable, he was undergoing debridement in the office  The patient presented to the ER on 10/29 with acute onset fever, worsening swelling and redness of foot  In the ER, vitals were notable for fever to 102 4 with tachycardia   Labs were notable for leukocytosis and elevated creatinine  XR foot showed no focus of infection  Patient was admitted on vanc, cefazolin, Flagyl  MRI of the foot noted osteo of the distal half of the distal phalanx of the 2nd toe without definitive osteo at the level of the 3rd MTP joint  Blood cultures have been negative, wound cultures growing group G strep and a Gram-negative chetan  Podiatry's surgical plan is awaited  Overnight has had no fever or leukocytosis  The patient currently has no complaints  Denies fevers, chills, or sweats  Denies nausea, vomiting, or diarrhea  Infectious disease is being consulted for diagnostic work up and antibiotic management  Review of Systems  Pertinent positives and negatives as noted in HPI   Rest complete 12 point system-based review of systems is otherwise negative  PAST MEDICAL HISTORY:  Past Medical History:   Diagnosis Date   • Chronic kidney failure, stage 2 (mild)    • Diabetes mellitus (HCC)    • GERD (gastroesophageal reflux disease)    • Gout    • Hypertension      Past Surgical History:   Procedure Laterality Date   • ANKLE FRACTURE SURGERY Right    • CHOLECYSTECTOMY         FAMILY HISTORY:  Non-contributory    SOCIAL HISTORY:  Social History   Single  Social History     Substance and Sexual Activity   Alcohol Use Not Currently     Social History     Substance and Sexual Activity   Drug Use Never     Social History     Tobacco Use   Smoking Status Former Smoker   • Packs/day: 1 00   • Types: Cigarettes   Smokeless Tobacco Never Used       ALLERGIES:  No Known Allergies    MEDICATIONS:  All current active medications have been reviewed      Physical Exam     Temp:  [98 1 °F (36 7 °C)-99 3 °F (37 4 °C)] 98 1 °F (36 7 °C)  HR:  [66-81] 74  Resp:  [14-18] 14  BP: (122-149)/(62-81) 149/81  SpO2:  [94 %-99 %] 94 %  Temp (24hrs), Av 7 °F (37 1 °C), Min:98 1 °F (36 7 °C), Max:99 3 °F (37 4 °C)  Current: Temperature: 98 1 °F (36 7 °C)    Intake/Output Summary (Last 24 hours) at 2022 1006  Last data filed at 2022 1313  Gross per 24 hour   Intake 1200 ml   Output 5075 ml   Net -3875 ml         Physical exam findings reported by bedside and primary medical team staff    General Appearance:  Appearing well, nontoxic, and in no distress, appears stated age   Head:  Normocephalic, without obvious abnormality, atraumatic   Eyes:  PERRL, conjunctiva pink and sclera anicteric, both eyes   Nose: Nares normal, mucosa normal, no drainage   Throat: Oropharynx moist without lesions; lips, mucosa, and tongue normal; teeth and gums normal   Neck: Supple, symmetrical, trachea midline, no adenopathy, no tenderness/mass/nodules   Back:   Symmetric, no curvature, ROM normal, no CVA tenderness   Lungs:   Clear to auscultation bilaterally, no audible wheezes, rhonchi and rales, respirations unlabored   Chest Wall:  No tenderness or deformity   Heart:  Regular rate and rhythm, S1, S2 normal, no murmur, rub or gallop   Abdomen:   Soft, non-tender, non-distended, positive bowel sounds, no masses, no organomegaly    No CVA tenderness   Extremities: Extremities normal, atraumatic, no cyanosis, clubbing   Edema R FOOT   Skin: Erythema from toes to poor foot no purulence  Superficial distal 2nd toe ulcer, probe to bone, fibrous base  3 cm plantar foot ulcer overlying 3rd metatarsal with undermining but no probe to bone  Lymph nodes: Cervical, supraclavicular, and axillary nodes normal   Neurologic: Alert and oriented times 3, extremity strength 5/5 and symmetric       Invasive Devices:   Peripheral IV 10/31/22 Distal;Left;Ventral (anterior) Forearm (Active)   Site Assessment WDL 10/31/22 2020   Dressing Type Transparent 10/31/22 2020   Line Status Flushed;Saline locked 10/31/22 2020   Dressing Status Clean;Dry; Intact 10/31/22 2020       Labs, Imaging, & Other Studies     Lab Results:    I have personally reviewed pertinent labs  Results from last 7 days   Lab Units 10/31/22  0502 10/30/22  0552 10/29/22  2032   WBC Thousand/uL 10 49* 12 20* 14 70*   HEMOGLOBIN g/dL 9 3* 9 7* 11 1*   PLATELETS Thousands/uL 179 173 241     Results from last 7 days   Lab Units 10/31/22  0502 10/30/22  0552 10/29/22  2032   POTASSIUM mmol/L 4 5   < > 4 6   CHLORIDE mmol/L 106   < > 101   CO2 mmol/L 25   < > 25   BUN mg/dL 30*   < > 28*   CREATININE mg/dL 2 28*   < > 2 42*   EGFR ml/min/1 73sq m 29   < > 27   CALCIUM mg/dL 8 3   < > 8 5   AST U/L  --   --  16   ALT U/L  --   --  30   ALK PHOS U/L  --   --  67    < > = values in this interval not displayed  Results from last 7 days   Lab Units 10/29/22  2032   BLOOD CULTURE  No Growth at 24 hrs  No Growth at 24 hrs     GRAM STAIN RESULT  4+ Gram positive cocci in pairs and chains*  4+ Gram negative coccobacilli*  No polys seen*   WOUND CULTURE  4+ Growth of Beta Hemolytic Streptococcus Group G*  1+ Growth of Gram Negative Miko*       Imaging Studies:   I have personally reviewed pertinent imaging study reports and images in PACS  EKG, Pathology, and Other Studies:   I have personally reviewed pertinent reports  Counseling/Coordination of care: Total 70 minutes communication with the patient via telehealth  Labs, medical tests and imaging studies were independently and extensively reviewed by me as noted above in HPI and old records were obtained and summarized as noted above in HPI  My recommendations were discussed with the patient in detail who verbalized understanding

## 2022-11-01 NOTE — ASSESSMENT & PLAN NOTE
· Chronic anemia  · Hemoglobin 11 1 on admission appears baseline  · Probable secondary to CKD 4  · Monitor closely    Lab Results   Component Value Date    HGB 9 3 (L) 10/31/2022    HGB 9 7 (L) 10/30/2022    HGB 11 1 (L) 10/29/2022

## 2022-11-01 NOTE — PROGRESS NOTES
Progress Note - Nephrology   Erendira Medina 57 y o  male MRN: [id number]  Unit/Bed#: -01 Encounter: [id number]    A/P:  1  Chronic kidney disease stage 4 with baseline creatinine between 2 2-2 4 mg/dL              Kidney function remains stable, continue to avoid potential nephrotoxins and other medications that can effect renal perfusion especially within 24 hrs of procedure  2  Proteinuria              As discussed in prior note, patient may benefit from an SGLT-2 inhibitor, however, will need to be initiated in the outpatient setting when he is in his usual state of health  3  Probable underlying diabetic nephropathy              Continue to optimize care  4  Hypertension the setting of chronic kidney disease stage 4              Blood pressure is acceptable at this time  5  Iron deficiency anemia   Patient qualifies for iron infusions, however, currently contraindicated due to active infection  May be placed on oral supplementation with outpatient infusions once recovered from his infection  6  Osteomyelitis of the right foot   Patient for surgical intervention tomorrow, 11/2  Follow up reason for today's visit: CKD 4/proteinuria/diabetic nephropathy/hypertension    Sepsis [facility])    Patient Active Problem List   Diagnosis   • Osteomyelitis of foot, right, acute ([facility] [address] )   • Sepsis ([facility] [address] )   • Acquired hypothyroidism   • Stage 4 chronic kidney disease ([facility] [address] )   • Essential hypertension   • Chronic anemia   • Type 2 diabetes mellitus with circulatory disorder, without long-term current use of insulin ([facility])         Subjective:   Patient offers no complaints at this time, eating and drinking with no n-v      Objective:     Vitals: Blood pressure 149/81, pulse 74, temperature 98 1 °F (36 7 °C), resp  rate 14, height 5' 10" (1 778 m), weight 112 kg (247 lb 2 2 oz), SpO2 95 %  ,Body mass index is 35 46 kg/m²      Weight (last 2 days)     None            Intake/Output Summary (Last 24 hours) at 11/1/2022 1402  Last data filed at 11/1/2022 1135  Gross per 24 hour   Intake 960 ml   Output 4625 ml   Net -3665 ml     I/O last 3 completed shifts: In: 1260 [P O :1260]  Out: 5550 [Urine:5550]         Physical Exam: /81   Pulse 74   Temp 98 1 °F (36 7 °C)   Resp 14   Ht 5' 10" (1 778 m)   Wt 112 kg (247 lb 2 2 oz)   SpO2 95%   BMI 35 46 kg/m²     General Appearance:    Alert, cooperative, no distress, appears stated age   Head:    Normocephalic, without obvious abnormality, atraumatic   Eyes:    Conjunctiva/corneas clear   Ears:    Normal external ears   Nose:   Nares normal, septum midline, mucosa normal, no drainage    or sinus tenderness   Throat:   Lips, mucosa, and tongue normal; teeth and gums normal   Neck:   Supple, symmetrical, trachea midline, no adenopathy;        thyroid:  No enlargement/tenderness/nodules; no carotid    bruit or JVD   Back:     Symmetric, no curvature, ROM normal, no CVA tenderness   Lungs:     Clear to auscultation bilaterally, respirations unlabored   Chest wall:    No tenderness or deformity   Heart:    Regular rate and rhythm, S1 and S2 normal, no murmur, rub   or gallop   Abdomen:     Soft, non-tender, bowel sounds active   Extremities:   Extremities normal, atraumatic, no cyanosis or edema   Skin:   Skin color, texture, turgor normal, no rashes or lesions   Lymph nodes:   Cervical normal   Neurologic:   CNII-XII intact            Lab, Imaging and other studies: I have personally reviewed pertinent labs  CBC:   Lab Results   Component Value Date    WBC 6 92 11/01/2022    HGB 9 7 (L) 11/01/2022    HCT 29 6 (L) 11/01/2022     (H) 11/01/2022     11/01/2022    MCH 33 7 11/01/2022    MCHC 32 8 11/01/2022    RDW 12 4 11/01/2022    MPV 9 2 11/01/2022     CMP: No results found for: NA, K, CL, CO2, ANIONGAP, BUN, CREATININE, GLUCOSE, CALCIUM, AST, ALT, ALKPHOS, PROT, BILITOT, EGFR        Results from last 7 days   Lab Units 10/31/22  0502 10/30/22  7517 10/29/22  2032 POTASSIUM mmol/L 4 5 4 5 4 6   CHLORIDE mmol/L 106 106 101   CO2 mmol/L 25 25 25   BUN mg/dL 30* 27* 28*   CREATININE mg/dL 2 28* 2 22* 2 42*   CALCIUM mg/dL 8 3 8 0* 8 5   ALK PHOS U/L  --   --  67   ALT U/L  --   --  30   AST U/L  --   --  16         Phosphorus: No results found for: PHOS  Magnesium: No results found for: MG  Urinalysis: No results found for: COLORU, CLARITYU, SPECGRAV, PHUR, LEUKOCYTESUR, NITRITE, PROTEINUA, GLUCOSEU, KETONESU, BILIRUBINUR, BLOODU  Ionized Calcium: No results found for: CAION  Coagulation: No results found for: PT, INR, APTT  Troponin: No results found for: TROPONINI  ABG: No results found for: PHART, CYR5DUW, PO2ART, NTE9OHL, M6XADIND, BEART, SOURCE  Radiology review:     IMAGING  Procedure: XR foot 2 views RIGHT    Result Date: 10/30/2022  Narrative: RIGHT FOOT INDICATION:   Diabetic foot wound, rule out osteomyelitis  COMPARISON:  None VIEWS:  XR FOOT 2 VW RIGHT FINDINGS: Partially imaged fixation hardware at the ankle  There is no acute fracture or dislocation  Mild hallux valgus with mild 1st MTP degenerative changes  No lytic or blastic osseous lesion  Planter ulcer along the forefoot  Impression: No sign of osteomyelitis  Workstation performed: [id number]     Procedure: MRI foot/forefoot toes right wo contrast    Result Date: 10/31/2022  Narrative: MRI FOOT/FOREFOOT TOES RIGHT WO CONTRAST INDICATION:   Right foot infection  COMPARISON: Correlation is made with the prior radiograph dated 10/29/2022  TECHNIQUE:  MR sequences were obtained of the right foot including the following:  Localizer, sagittal T1/STIR, coronal T1/T2 fat/ sat/STIR, axial T2 fat sat/STIR/PD  Images were acquired on a 1 5 Geeta unit  Gadolinium was not used FINDINGS: SUBCUTANEOUS TISSUES: There is ulceration along the tip of the 2nd toe with surrounding cellulitis  No well-defined abscess seen  There is a plantar surface ulceration along the forefoot at the level of the 3rd MTP joint    There is surrounding cellulitis without discrete abscess  There is dorsal subcutaneous edema  BONES:  There is patchy decreased T1 and increased T2 signal through the distal half of the 2nd toe distal phalanx consistent with osteomyelitis  There is minimal marrow edema within the 3rd toe proximal phalanx without confluent decreased T1 marrow signal to suggest osteomyelitis  This minimal marrow edema is nonspecific  FIRST MTP JOINT:  Mild osteoarthrosis with mild hallux valgus  SESAMOID BONES:  Intact  OTHER ARTICULAR SURFACE:  There is a small joint effusion seen within the 3rd and 4th MTP joints  PLANTAR FASCIA:  Intact  LISFRANC LIGAMENT:  Intact  FOREFOOT TENDONS: Intact  INTERMETATARSAL REGIONS: No bursitis or Alicea's neuroma  MUSCULATURE: There is prominent fatty atrophy of the tarsal muscles  Impression: Ulceration tip of the 2nd toe with osteomyelitis involving the distal half of the 2nd toe distal phalanx  Plantar soft tissue ulcer along the forefoot at the level of the 3rd MTP joint  There is minimal marrow edema within the 3rd proximal phalanx, nonspecific without confluent decreased T1 marrow signal to definitively suggest osteomyelitis  Small joint effusions seen within the 3rd and 4th MTP joints  Mild 1st MTP joint osteoarthrosis and moderate hallux valgus  Prominent fatty atrophy of the tarsal muscles  Workstation performed: [id number]     Procedure: US kidney and bladder    Result Date: 10/31/2022  Narrative: RENAL ULTRASOUND INDICATION:   CKD  COMPARISON: November 7, 2005 TECHNIQUE:   Ultrasound of the retroperitoneum was performed with a curvilinear transducer utilizing volumetric sweeps and still imaging techniques  FINDINGS: KIDNEYS: Symmetric and normal size  Right kidney:  11 1 x 5 5 x 5 3 cm  Volume 169 1 mL Left kidney:  10 6 x 5 4 x 5 0 cm  Volume 148 6 mL Right kidney Normal echogenicity and contour  No mass is identified  No hydronephrosis  No shadowing calculi   No perinephric fluid collections  Left kidney Normal echogenicity and contour  No mass is identified  No hydronephrosis  No shadowing calculi  No perinephric fluid collections  URETERS: Nonvisualized  BLADDER: Incompletely distended No focal thickening or mass lesions  Bilateral ureteral jets detected       Impression: Unremarkable exam  Workstation performed: [id number]       Current Facility-Administered Medications   Medication Dose Route Frequency   • allopurinol (ZYLOPRIM) tablet 100 mg  100 mg Oral Daily   • aluminum-magnesium hydroxide-simethicone (MYLANTA) oral suspension 30 mL  30 mL Oral Q4H PRN   • aspirin (ECOTRIN LOW STRENGTH) EC tablet 81 mg  81 mg Oral Daily   • cefepime (MAXIPIME) IVPB (premix in dextrose) 2,000 mg 50 mL  2,000 mg Intravenous Q12H   • heparin (porcine) subcutaneous injection 5,000 Units  5,000 Units Subcutaneous Q8H [facility]   • HYDROcodone-acetaminophen (NORCO) 5-325 mg per tablet 1 tablet  1 tablet Oral Q6H PRN   • insulin lispro (HumaLOG) 100 units/mL subcutaneous injection 1-6 Units  1-6 Units Subcutaneous TID AC   • insulin lispro (HumaLOG) 100 units/mL subcutaneous injection 1-6 Units  1-6 Units Subcutaneous HS   • levothyroxine tablet 150 mcg  150 mcg Oral Early Morning   • metoprolol succinate (TOPROL-XL) 24 hr tablet 100 mg  100 mg Oral Daily   • metroNIDAZOLE (FLAGYL) IVPB (premix) 500 mg 100 mL  500 mg Intravenous Q8H   • pantoprazole (PROTONIX) EC tablet 40 mg  40 mg Oral Early Morning   • sodium chloride (OCEAN) 0 65 % nasal spray 1 spray  1 spray Each Nare Q4H PRN     Medications Discontinued During This Encounter   Medication Reason   • atenolol (TENORMIN) 50 mg tablet    • influenza vaccine, recombinant, quadrivalent (FLUBLOK) IM injection 0 5 mL    • LORazepam (ATIVAN) injection 0 5 mg    • ondansetron (ZOFRAN) injection 4 mg    • heparin (porcine) subcutaneous injection 5,000 Units    • vancomycin (VANCOCIN) 1,750 mg in sodium chloride 0 9 % 500 mL IVPB    • ceFAZolin (ANCEF) IVPB (premix in dextrose) 2,000 mg 50 mL        Roberto Rued      This progress note was produced in part using a dictation device which may document imprecise wording from author's original intent

## 2022-11-01 NOTE — ASSESSMENT & PLAN NOTE
Lab Results   Component Value Date    HGBA1C 6 5 (H) 08/17/2022       Recent Labs     10/31/22  1553 10/31/22  2126 11/01/22  0726 11/01/22  1055   POCGLU 104 131 119 202*       Blood Sugar Average: Last 72 hrs:  (P) 138     · Glipizide on hold with CKD 4  ·  sliding scale insulin coverage with Accu-Cheks  · Carbohydrate controlled diet  · Hypoglycemia protocol

## 2022-11-01 NOTE — PLAN OF CARE
Problem: Potential for Falls  Goal: Patient will remain free of falls  Description: INTERVENTIONS:  - Educate patient/family on patient safety including physical limitations  - Instruct patient to call for assistance with activity   - Consult OT/PT to assist with strengthening/mobility   - Keep Call bell within reach  - Keep bed low and locked with side rails adjusted as appropriate  - Keep care items and personal belongings within reach  - Initiate and maintain comfort rounds  - Make Fall Risk Sign visible to staff  - Offer Toileting every 2 Hours, in advance of need  - Initiate/Maintain bed/chair alarm  - Obtain necessary fall risk management equipment:   - Apply yellow socks and bracelet for high fall risk patients  - Consider moving patient to room near nurses station  Outcome: Progressing     Problem: PAIN - ADULT  Goal: Verbalizes/displays adequate comfort level or baseline comfort level  Description: Interventions:  - Encourage patient to monitor pain and request assistance  - Assess pain using appropriate pain scale  - Administer analgesics based on type and severity of pain and evaluate response  - Implement non-pharmacological measures as appropriate and evaluate response  - Consider cultural and social influences on pain and pain management  - Notify physician/advanced practitioner if interventions unsuccessful or patient reports new pain  Outcome: Progressing     Problem: INFECTION - ADULT  Goal: Absence or prevention of progression during hospitalization  Description: INTERVENTIONS:  - Assess and monitor for signs and symptoms of infection  - Monitor lab/diagnostic results  - Monitor all insertion sites, i e  indwelling lines, tubes, and drains  - Monitor endotracheal if appropriate and nasal secretions for changes in amount and color  - [place] appropriate cooling/warming therapies per order  - Administer medications as ordered  - Instruct and encourage patient and family to use good hand hygiene technique  - Identify and instruct in appropriate isolation precautions for identified infection/condition  Outcome: Progressing  Goal: Absence of fever/infection during neutropenic period  Description: INTERVENTIONS:  - Monitor WBC    Outcome: Progressing     Problem: SAFETY ADULT  Goal: Patient will remain free of falls  Description: INTERVENTIONS:  - Educate patient/family on patient safety including physical limitations  - Instruct patient to call for assistance with activity   - Consult OT/PT to assist with strengthening/mobility   - Keep Call bell within reach  - Keep bed low and locked with side rails adjusted as appropriate  - Keep care items and personal belongings within reach  - Initiate and maintain comfort rounds  - Make Fall Risk Sign visible to staff  - Offer Toileting every 2 Hours, in advance of need  - Initiate/Maintain bed/chair alarm  - Obtain necessary fall risk management equipment:   - Apply yellow socks and bracelet for high fall risk patients  - Consider moving patient to room near nurses station  Outcome: Progressing  Goal: Maintain or return to baseline ADL function  Description: INTERVENTIONS:  - Educate patient/family on patient safety including physical limitations  - Instruct patient to call for assistance with activity   - Consult OT/PT to assist with strengthening/mobility   - Keep Call bell within reach  - Keep bed low and locked with side rails adjusted as appropriate  - Keep care items and personal belongings within reach  - Initiate and maintain comfort rounds  - Make Fall Risk Sign visible to staff  - Offer Toileting every 2 Hours, in advance of need  - Initiate/Maintain bed/chair alarm  - Obtain necessary fall risk management equipment:   - Apply yellow socks and bracelet for high fall risk patients  - Consider moving patient to room near nurses station  Outcome: Progressing  Goal: Maintains/Returns to pre admission functional level  Description: INTERVENTIONS:  - Perform BMAT or MOVE assessment daily    - Set and communicate daily mobility goal to care team and patient/family/caregiver  - Collaborate with rehabilitation services on mobility goals if consulted  - Perform Range of Motion    times a day  - Reposition patient every    hours  - Dangle patient    times a day  - Stand patient    times a day  - Ambulate patient times a day  - Out of bed to chair      times a day   - Out of bed for meals    times a day  - Out of bed for toileting  - Record patient progress and toleration of activity level   Outcome: Progressing     Problem: DISCHARGE PLANNING  Goal: Discharge to home or other facility with appropriate resources  Description: INTERVENTIONS:  - Identify barriers to discharge w/patient and caregiver  - Arrange for needed discharge resources and transportation as appropriate  - Identify discharge learning needs (meds, wound care, etc )  - Arrange for interpretive services to assist at discharge as needed  - Refer to Case Management Department for coordinating discharge planning if the patient needs post-hospital services based on physician/advanced practitioner order or complex needs related to functional status, cognitive ability, or social support system  Outcome: Progressing     Problem: Knowledge Deficit  Goal: Patient/family/caregiver demonstrates understanding of disease process, treatment plan, medications, and discharge instructions  Description: Complete learning assessment and assess knowledge base    Interventions:  - Provide teaching at level of understanding  - Provide teaching via preferred learning methods  Outcome: Progressing     Problem: METABOLIC, FLUID AND ELECTROLYTES - ADULT  Goal: Electrolytes maintained within normal limits  Description: INTERVENTIONS:  - Monitor labs and assess patient for signs and symptoms of electrolyte imbalances  - Administer electrolyte replacement as ordered  - Monitor response to electrolyte replacements, including repeat lab results as appropriate  - Instruct patient on fluid and nutrition as appropriate  Outcome: Progressing  Goal: Fluid balance maintained  Description: INTERVENTIONS:  - Monitor labs   - Monitor I/O and WT  - Instruct patient on fluid and nutrition as appropriate  - Assess for signs & symptoms of volume excess or deficit  Outcome: Progressing  Goal: Glucose maintained within target range  Description: INTERVENTIONS:  - Monitor Blood Glucose as ordered  - Assess for signs and symptoms of hyperglycemia and hypoglycemia  - Administer ordered medications to maintain glucose within target range  - Assess nutritional intake and initiate nutrition service referral as needed  Outcome: Progressing     Problem: SKIN/TISSUE INTEGRITY - ADULT  Goal: Skin Integrity remains intact(Skin Breakdown Prevention)  Description: Assess:  -Perform [place] assessment every shift  -Clean and moisturize skin every shift  -Inspect skin when repositioning, toileting, and assisting with ADLS  -Assess extremities for adequate circulation and sensation     Bed Management:  -Have minimal linens on bed & keep smooth, unwrinkled  -Change linens as needed when moist or perspiring  -Avoid sitting or lying in one position for more than 2 hours while in bed  -Keep HOB at 30degrees     Toileting:  -Offer bedside commode  -Assess for incontinence every 3 hours  -Use incontinent care products after each incontinent episode such as pad/condom cath    Activity:  -Mobilize patient 3 times a day  -Encourage activity and walks on unit  -Encourage or provide ROM exercises   -Turn and reposition patient every 2 Hours  -Use appropriate equipment to lift or move patient in bed  -Instruct/ Assist with weight shifting every 15 minutes when out of bed in chair  -Consider limitation of chair time 3 hour intervals    Skin Care:  -Avoid use of baby powder, tape, friction and shearing, hot water or constrictive clothing  -Relieve pressure over bony prominences using mepilex  -Do not massage red bony areas    Outcome: Progressing  Goal: Incision(s), wounds(s) or drain site(s) healing without S/S of infection  Description: INTERVENTIONS  - Assess and document dressing, incision, wound bed, drain sites and surrounding tissue  - Provide patient and family education  - Perform skin care/dressing changes every shift  Outcome: Progressing     Problem: Nutrition/Hydration-ADULT  Goal: Nutrient/Hydration intake appropriate for improving, restoring or maintaining nutritional needs  Description: Monitor and assess patient's nutrition/hydration status for malnutrition  Collaborate with interdisciplinary team and initiate plan and interventions as ordered  Monitor patient's weight and dietary intake as ordered or per policy  Utilize nutrition screening tool and intervene as necessary  Determine patient's food preferences and provide high-protein, high-caloric foods as appropriate       INTERVENTIONS:  - Monitor oral intake, urinary output, labs, and treatment plans  - Assess nutrition and hydration status and recommend course of action  - Evaluate amount of meals eaten  - Assist patient with eating if necessary   - Allow adequate time for meals  - Recommend/ encourage appropriate diets, oral nutritional supplements, and vitamin/mineral supplements  - Order, calculate, and assess calorie counts as needed  - Recommend, monitor, and adjust tube feedings and TPN/PPN based on assessed needs  - Assess need for intravenous fluids  - Provide specific nutrition/hydration education as appropriate  - Include patient/family/caregiver in decisions related to nutrition  Outcome: Progressing

## 2022-11-01 NOTE — ASSESSMENT & PLAN NOTE
· Infected diabetic plantar ulcer  · Plantar wound present for 1 month and has been followed by [facility] outpatient  · Vascular duplex obtained bilaterally on 10/13/2022 without evidence of significant stenosis  · Completed ABX course of Keflex as OP  · Developed acute pain, fever, chills, redness and swelling of right foot on 10/28  · Presents with significant edema/erythema of right foot, 2nd and 3rd digits with sausage like appearance  · Leukocytosis, elevated inflammatory markers  · X-ray right foot- bony erosion of distal 2nd digit  · MRI of the foot shows osteomyelitis  · Podiatry consult appreciated-plan is to do surgery on 11/02/22-patient is to be NPO after midnight, and also hold heparin after night dose on 11/01/2022  · Patient remained medically optimize for upcoming podiatry procedure, patient has 2 points on revised cardiac risk index for preoperative risk  · Final abx course determined by surgery - for now continue abx as above

## 2022-11-02 ENCOUNTER — APPOINTMENT (INPATIENT)
Dept: RADIOLOGY | Facility: HOSPITAL | Age: 62
End: 2022-11-02

## 2022-11-02 ENCOUNTER — ANESTHESIA (INPATIENT)
Dept: PERIOP | Facility: HOSPITAL | Age: 62
End: 2022-11-02

## 2022-11-02 LAB
ANION GAP SERPL CALCULATED.3IONS-SCNC: 8 MMOL/L (ref 4–13)
ATRIAL RATE: 65 BPM
BUN SERPL-MCNC: 32 MG/DL (ref 5–25)
CALCIUM SERPL-MCNC: 8.9 MG/DL (ref 8.3–10.1)
CHLORIDE SERPL-SCNC: 105 MMOL/L (ref 96–108)
CO2 SERPL-SCNC: 25 MMOL/L (ref 21–32)
CREAT SERPL-MCNC: 2.26 MG/DL (ref 0.6–1.3)
ERYTHROCYTE [DISTWIDTH] IN BLOOD BY AUTOMATED COUNT: 12.3 % (ref 11.6–15.1)
GFR SERPL CREATININE-BSD FRML MDRD: 29 ML/MIN/1.73SQ M
GLUCOSE SERPL-MCNC: 105 MG/DL (ref 65–140)
GLUCOSE SERPL-MCNC: 115 MG/DL (ref 65–140)
GLUCOSE SERPL-MCNC: 115 MG/DL (ref 65–140)
GLUCOSE SERPL-MCNC: 123 MG/DL (ref 65–140)
GLUCOSE SERPL-MCNC: 125 MG/DL (ref 65–140)
GLUCOSE SERPL-MCNC: 144 MG/DL (ref 65–140)
HCT VFR BLD AUTO: 29.1 % (ref 36.5–49.3)
HGB BLD-MCNC: 9.5 G/DL (ref 12–17)
MCH RBC QN AUTO: 33.2 PG (ref 26.8–34.3)
MCHC RBC AUTO-ENTMCNC: 32.6 G/DL (ref 31.4–37.4)
MCV RBC AUTO: 102 FL (ref 82–98)
P AXIS: 36 DEGREES
PLATELET # BLD AUTO: 222 THOUSANDS/UL (ref 149–390)
PMV BLD AUTO: 9.4 FL (ref 8.9–12.7)
POTASSIUM SERPL-SCNC: 4.7 MMOL/L (ref 3.5–5.3)
PR INTERVAL: 158 MS
QRS AXIS: 4 DEGREES
QRSD INTERVAL: 94 MS
QT INTERVAL: 440 MS
QTC INTERVAL: 457 MS
RBC # BLD AUTO: 2.86 MILLION/UL (ref 3.88–5.62)
SODIUM SERPL-SCNC: 138 MMOL/L (ref 135–147)
T WAVE AXIS: 5 DEGREES
VENTRICULAR RATE: 65 BPM
WBC # BLD AUTO: 6.99 THOUSAND/UL (ref 4.31–10.16)

## 2022-11-02 PROCEDURE — 0Y6R0Z2 DETACHMENT AT RIGHT 2ND TOE, MID, OPEN APPROACH: ICD-10-PCS | Performed by: STUDENT IN AN ORGANIZED HEALTH CARE EDUCATION/TRAINING PROGRAM

## 2022-11-02 PROCEDURE — 0JBQ0ZZ EXCISION OF RIGHT FOOT SUBCUTANEOUS TISSUE AND FASCIA, OPEN APPROACH: ICD-10-PCS | Performed by: STUDENT IN AN ORGANIZED HEALTH CARE EDUCATION/TRAINING PROGRAM

## 2022-11-02 RX ORDER — PROPOFOL 10 MG/ML
INJECTION, EMULSION INTRAVENOUS CONTINUOUS PRN
Status: DISCONTINUED | OUTPATIENT
Start: 2022-11-02 | End: 2022-11-02

## 2022-11-02 RX ORDER — FENTANYL CITRATE/PF 50 MCG/ML
25 SYRINGE (ML) INJECTION
Status: DISCONTINUED | OUTPATIENT
Start: 2022-11-02 | End: 2022-11-02

## 2022-11-02 RX ORDER — BUPIVACAINE HYDROCHLORIDE 2.5 MG/ML
INJECTION, SOLUTION EPIDURAL; INFILTRATION; INTRACAUDAL AS NEEDED
Status: DISCONTINUED | OUTPATIENT
Start: 2022-11-02 | End: 2022-11-02 | Stop reason: HOSPADM

## 2022-11-02 RX ORDER — SODIUM CHLORIDE 9 MG/ML
INJECTION, SOLUTION INTRAVENOUS CONTINUOUS PRN
Status: DISCONTINUED | OUTPATIENT
Start: 2022-11-02 | End: 2022-11-02

## 2022-11-02 RX ORDER — PROPOFOL 10 MG/ML
INJECTION, EMULSION INTRAVENOUS AS NEEDED
Status: DISCONTINUED | OUTPATIENT
Start: 2022-11-02 | End: 2022-11-02

## 2022-11-02 RX ORDER — LIDOCAINE HYDROCHLORIDE 10 MG/ML
INJECTION, SOLUTION EPIDURAL; INFILTRATION; INTRACAUDAL; PERINEURAL AS NEEDED
Status: DISCONTINUED | OUTPATIENT
Start: 2022-11-02 | End: 2022-11-02 | Stop reason: HOSPADM

## 2022-11-02 RX ORDER — METOCLOPRAMIDE HYDROCHLORIDE 5 MG/ML
10 INJECTION INTRAMUSCULAR; INTRAVENOUS ONCE AS NEEDED
Status: DISCONTINUED | OUTPATIENT
Start: 2022-11-02 | End: 2022-11-02

## 2022-11-02 RX ADMIN — PROPOFOL 50 MG: 10 INJECTION, EMULSION INTRAVENOUS at 12:28

## 2022-11-02 RX ADMIN — PANTOPRAZOLE SODIUM 40 MG: 40 TABLET, DELAYED RELEASE ORAL at 05:23

## 2022-11-02 RX ADMIN — CEFEPIME HYDROCHLORIDE 2000 MG: 2 INJECTION, SOLUTION INTRAVENOUS at 00:59

## 2022-11-02 RX ADMIN — CEFEPIME HYDROCHLORIDE 2000 MG: 2 INJECTION, SOLUTION INTRAVENOUS at 12:33

## 2022-11-02 RX ADMIN — AMPICILLIN SODIUM AND SULBACTAM SODIUM 3 G: 2; 1 INJECTION, POWDER, FOR SOLUTION INTRAMUSCULAR; INTRAVENOUS at 14:37

## 2022-11-02 RX ADMIN — HYDROCODONE BITARTRATE AND ACETAMINOPHEN 1 TABLET: 5; 325 TABLET ORAL at 18:39

## 2022-11-02 RX ADMIN — PROPOFOL 80 MCG/KG/MIN: 10 INJECTION, EMULSION INTRAVENOUS at 12:33

## 2022-11-02 RX ADMIN — AMPICILLIN SODIUM AND SULBACTAM SODIUM 3 G: 2; 1 INJECTION, POWDER, FOR SOLUTION INTRAMUSCULAR; INTRAVENOUS at 18:32

## 2022-11-02 RX ADMIN — PROPOFOL 50 MG: 10 INJECTION, EMULSION INTRAVENOUS at 12:32

## 2022-11-02 RX ADMIN — LEVOTHYROXINE SODIUM 150 MCG: 150 TABLET ORAL at 05:23

## 2022-11-02 RX ADMIN — METOPROLOL SUCCINATE 100 MG: 100 TABLET, EXTENDED RELEASE ORAL at 07:42

## 2022-11-02 RX ADMIN — METRONIDAZOLE 500 MG: 500 INJECTION, SOLUTION INTRAVENOUS at 07:42

## 2022-11-02 RX ADMIN — ALLOPURINOL 100 MG: 100 TABLET ORAL at 07:42

## 2022-11-02 RX ADMIN — Medication: at 12:25

## 2022-11-02 RX ADMIN — ASPIRIN 81 MG: 81 TABLET, COATED ORAL at 07:42

## 2022-11-02 NOTE — DISCHARGE INSTRUCTIONS
Discharge Instructions - Podiatry    Weight Bearing Status: weight bear as tolerated in toe unloading shoe    Follow-up appointment instructions: Please make an appointment within one week of discharge with Dr Pj Ndiaye at [facility]'s wound care  Contact sooner if any increase in pain, or signs of infection occur    Patient Wound Care Instructions: Leave dressings clean, dry, and intact until 1st outpatient office visit

## 2022-11-02 NOTE — PROGRESS NOTES
PRE-Operative Note - Podiatry  Daryl Ordaz 57 y o  male MRN: [id number]  Unit/Bed#: -[facility] Encounter: [id number]    Assessment:  1  Right 2nd toe diabetic ulceration with OM of distal phalanx confirmed on MRI (10/31/22)   2  Right plantar forefoot diabetic ulceration  3  Right foot cellulitis and septic on admission due to #1/2  4  DM (A1c 6 5% 8/17/22)  5  CKD 4    Plan:  1  Consent signed and in chart: Right 2nd toe partial amputation & right plantar foot wound debridement  2  Confirmed NPO status  3  H&P reviewed, no changes  4  Alternatives, risks, and complications discussed with patient  5  All questions answered  No guarantees given to outcome of procedure  6  IV abx on floor per ID      Subjective/Objective   Chief Complaint:   Chief Complaint   Patient presents with   • Fever - 9 weeks to 74 years     Pt reports he is receiving treatment for diabetic ulcer on R foot  Pr reports swelling in affected extremity, fever beginning yesterday        Subjective: Seen resting in pre-op holding  No questions about surgery  Has been NPO midnight  Blood pressure 128/80, pulse 69, temperature 98 1 °F (36 7 °C), resp  rate 14, height 5' 10" (1 778 m), weight 112 kg (247 lb 2 2 oz), SpO2 91 %  ,Body mass index is 35 46 kg/m²  Invasive Devices  Report    Peripheral Intravenous Line  Duration           Peripheral IV 11/01/22 Right Hand <1 day                Physical Exam:   General appearance: alert, cooperative and no distress    Extremity: R foot MSK, NVS baseline  Dressing C/D/I                Lab, Imaging and other studies:   Admission on 10/29/2022   Component Date Value   • WBC 10/29/2022 14 70 (A)   • RBC 10/29/2022 3 30 (A)   • Hemoglobin 10/29/2022 11 1 (A)   • Hematocrit 10/29/2022 33 0 (A)   • MCV 10/29/2022 100 (A)   • MCH 10/29/2022 33 6    • MCHC 10/29/2022 33 6    • RDW 10/29/2022 12 2    • MPV 10/29/2022 10 2    • Platelets 85/15/3396 241    • nRBC 10/29/2022 0    • Neutrophils Relative 10/29/2022 75    • Immat GRANS % 10/29/2022 0    • Lymphocytes Relative 10/29/2022 12 (A)   • Monocytes Relative 10/29/2022 13 (A)   • Eosinophils Relative 10/29/2022 0    • Basophils Relative 10/29/2022 0    • Neutrophils Absolute 10/29/2022 10 89 (A)   • Immature Grans Absolute 10/29/2022 0 06    • Lymphocytes Absolute 10/29/2022 1 72    • Monocytes Absolute 10/29/2022 1 96 (A)   • Eosinophils Absolute 10/29/2022 0 05    • Basophils Absolute 10/29/2022 0 02    • Sodium 10/29/2022 136    • Potassium 10/29/2022 4 6    • Chloride 10/29/2022 101    • CO2 10/29/2022 25    • ANION GAP 10/29/2022 10    • BUN 10/29/2022 28 (A)   • Creatinine 10/29/2022 2 42 (A)   • Glucose 10/29/2022 151 (A)   • Calcium 10/29/2022 8 5    • Corrected Calcium 10/29/2022 9 1    • AST 10/29/2022 16    • ALT 10/29/2022 30    • Alkaline Phosphatase 10/29/2022 67    • Total Protein 10/29/2022 8 0    • Albumin 10/29/2022 3 3 (A)   • Total Bilirubin 10/29/2022 0 99    • eGFR 10/29/2022 27    • LACTIC ACID 10/29/2022 1 2    • Procalcitonin 10/29/2022 0 27 (A)   • Sed Rate 10/29/2022 71 (A)   • CRP 10/29/2022 120 5 (A)   • Blood Culture 10/29/2022 No Growth at 48 hrs  • Blood Culture 10/29/2022 No Growth at 48 hrs      • Wound Culture 10/29/2022 4+ Growth of Beta Hemolytic Streptococcus Group G (A)   • Wound Culture 10/29/2022 1+ Growth of - Presumptive Acinetobacter courvalinii - Acinetobacter species (A)   • Gram Stain Result 10/29/2022 4+ Gram positive cocci in pairs and chains (A)   • Gram Stain Result 10/29/2022 4+ Gram negative coccobacilli (A)   • Gram Stain Result 10/29/2022 No polys seen (A)   • POC Glucose 10/29/2022 126    • Color, UA 10/30/2022 Yellow    • Clarity, UA 10/30/2022 Clear    • Specific Gravity, UA 10/30/2022 1 015    • pH, UA 10/30/2022 6 0    • Leukocytes, UA 10/30/2022 Negative    • Nitrite, UA 10/30/2022 Negative    • Protein, UA 10/30/2022 Negative    • Glucose, UA 10/30/2022 Negative    • Ketones, UA 10/30/2022 Negative    • Urobilinogen, UA 10/30/2022 0 2    • Bilirubin, UA 10/30/2022 Negative    • Occult Blood, UA 10/30/2022 Negative    • RBC, UA 10/30/2022 None Seen    • WBC, UA 10/30/2022 None Seen    • Epithelial Cells 10/30/2022 None Seen    • Bacteria, UA 10/30/2022 None Seen    • Sodium 10/30/2022 139    • Potassium 10/30/2022 4 5    • Chloride 10/30/2022 106    • CO2 10/30/2022 25    • ANION GAP 10/30/2022 8    • BUN 10/30/2022 27 (A)   • Creatinine 10/30/2022 2 22 (A)   • Glucose 10/30/2022 84    • Calcium 10/30/2022 8 0 (A)   • eGFR 10/30/2022 30    • Magnesium 10/30/2022 1 6    • Phosphorus 10/30/2022 3 4    • WBC 10/30/2022 12 20 (A)   • RBC 10/30/2022 2 82 (A)   • Hemoglobin 10/30/2022 9 7 (A)   • Hematocrit 10/30/2022 29 4 (A)   • MCV 10/30/2022 104 (A)   • MCH 10/30/2022 34 4 (A)   • MCHC 10/30/2022 33 0    • RDW 10/30/2022 12 3    • MPV 10/30/2022 9 9    • Platelets 09/29/1753 173    • nRBC 10/30/2022 0    • Neutrophils Relative 10/30/2022 68    • Immat GRANS % 10/30/2022 1    • Lymphocytes Relative 10/30/2022 17    • Monocytes Relative 10/30/2022 13 (A)   • Eosinophils Relative 10/30/2022 1    • Basophils Relative 10/30/2022 0    • Neutrophils Absolute 10/30/2022 8 42 (A)   • Immature Grans Absolute 10/30/2022 0 08    • Lymphocytes Absolute 10/30/2022 2 08    • Monocytes Absolute 10/30/2022 1 52 (A)   • Eosinophils Absolute 10/30/2022 0 08    • Basophils Absolute 10/30/2022 0 02    • Hepatitis C Ab 10/30/2022 Non-reactive    • TSH 3RD GENERATON 10/30/2022 0 618    • POC Glucose 10/30/2022 85    • POC Glucose 10/30/2022 172 (A)   • POC Glucose 10/30/2022 162 (A)   • Creatinine, Ur 10/30/2022 87 2    • Protein Urine Random 10/30/2022 32    • Prot/Creat Ratio, Ur 10/30/2022 0 37 (A)   • Creatinine, Ur 10/30/2022 87 2    • Microalbum  ,U,Random 10/30/2022 99 9 (A)   • Microalb Creat Ratio 10/30/2022 115 (A)   • POC Glucose 10/30/2022 167 (A)   • Sodium 10/31/2022 137    • Potassium 10/31/2022 4 5    • Chloride 10/31/2022 106    • CO2 10/31/2022 25    • ANION GAP 10/31/2022 6    • BUN 10/31/2022 30 (A)   • Creatinine 10/31/2022 2 28 (A)   • Glucose 10/31/2022 121    • Calcium 10/31/2022 8 3    • eGFR 10/31/2022 29    • WBC 10/31/2022 10 49 (A)   • RBC 10/31/2022 2 74 (A)   • Hemoglobin 10/31/2022 9 3 (A)   • Hematocrit 10/31/2022 28 4 (A)   • MCV 10/31/2022 104 (A)   • MCH 10/31/2022 33 9    • MCHC 10/31/2022 32 7    • RDW 10/31/2022 12 3    • Platelets 08/07/1777 179    • MPV 10/31/2022 9 9    • Vitamin B-12 10/31/2022 379    • Folate 10/31/2022 14 3    • Iron Saturation 10/31/2022 12 (A)   • TIBC 10/31/2022 164 (A)   • Iron 10/31/2022 20 (A)   • Ferritin 10/31/2022 299    • POC Glucose 10/31/2022 105    • POC Glucose 10/31/2022 145 (A)   • POC Glucose 10/31/2022 104    • POC Glucose 10/31/2022 131    • POC Glucose 11/01/2022 119    • POC Glucose 11/01/2022 202 (A)   • Sodium 11/01/2022 138    • Potassium 11/01/2022 4 5    • Chloride 11/01/2022 103    • CO2 11/01/2022 27    • ANION GAP 11/01/2022 8    • BUN 11/01/2022 30 (A)   • Creatinine 11/01/2022 2 18 (A)   • Glucose 11/01/2022 152 (A)   • Calcium 11/01/2022 9 0    • eGFR 11/01/2022 30    • WBC 11/01/2022 6 92    • RBC 11/01/2022 2 88 (A)   • Hemoglobin 11/01/2022 9 7 (A)   • Hematocrit 11/01/2022 29 6 (A)   • MCV 11/01/2022 103 (A)   • MCH 11/01/2022 33 7    • MCHC 11/01/2022 32 8    • RDW 11/01/2022 12 4    • Platelets 88/95/1142 205    • MPV 11/01/2022 9 2    • Magnesium 11/01/2022 1 9    • POC Glucose 11/01/2022 118    • POC Glucose 11/01/2022 168 (A)   • Sodium 11/02/2022 138    • Potassium 11/02/2022 4 7    • Chloride 11/02/2022 105    • CO2 11/02/2022 25    • ANION GAP 11/02/2022 8    • BUN 11/02/2022 32 (A)   • Creatinine 11/02/2022 2 26 (A)   • Glucose 11/02/2022 123    • Calcium 11/02/2022 8 9    • eGFR 11/02/2022 29    • WBC 11/02/2022 6 99    • RBC 11/02/2022 2 86 (A)   • Hemoglobin 11/02/2022 9 5 (A)   • Hematocrit 11/02/2022 29 1 (A)   • MCV 11/02/2022 102 (A)   • MCH 11/02/2022 33 2    • MCHC 11/02/2022 32 6    • RDW 11/02/2022 12 3    • Platelets 85/34/9242 222    • MPV 11/02/2022 9 4    • POC Glucose 11/02/2022 125      Imaging: I have personally reviewed pertinent films in PACS

## 2022-11-02 NOTE — ANESTHESIA PREPROCEDURE EVALUATION
Procedure:  RIGHT 2ND TOE PARTIAL AMPUTATION (Right Toe)    Relevant Problems   ANESTHESIA (within normal limits)      CARDIO   (+) Essential hypertension      ENDO   (+) Acquired hypothyroidism   (+) Type 2 diabetes mellitus with circulatory disorder, without long-term current use of insulin (HCC)      GI/HEPATIC   (+) GERD (gastroesophageal reflux disease)      /RENAL   (+) Stage 4 chronic kidney disease (HCC)      HEMATOLOGY   (+) Chronic anemia      PULMONARY (within normal limits)      Other   (+) Class 2 obesity in adult   (+) Osteomyelitis of foot, right, acute (HCC)        Physical Exam    Airway    Mallampati score: II  TM Distance: >3 FB  Neck ROM: full     Dental       Cardiovascular      Pulmonary      Other Findings  Poor dentition      Anesthesia Plan  ASA Score- 3     Anesthesia Type- IV sedation with anesthesia with ASA Monitors  Additional Monitors:   Airway Plan:     Comment: GA/LMA backup  Plan Factors-Exercise tolerance (METS): >4 METS  Chart reviewed  EKG reviewed  Existing labs reviewed  Patient summary reviewed  Patient is not a current smoker  Induction-     Postoperative Plan-     Informed Consent- Anesthetic plan and risks discussed with patient  I personally reviewed this patient with the CRNA  Discussed and agreed on the Anesthesia Plan with the CRNA  Tere Groves

## 2022-11-02 NOTE — ASSESSMENT & PLAN NOTE
Lab Results   Component Value Date    HGBA1C 6 5 (H) 08/17/2022       Recent Labs     11/01/22 2055 11/02/22  0728 11/02/22  1208 11/02/22  1405   POCGLU 168* 125 115 105       Blood Sugar Average: Last 72 hrs:  (P) 811 [id number]     · Glipizide on hold with CKD 4  ·  sliding scale insulin coverage with Accu-Cheks  · Carbohydrate controlled diet  · Hypoglycemia protocol

## 2022-11-02 NOTE — ASSESSMENT & PLAN NOTE
· Chronic anemia  · Hemoglobin 11 1 on admission appears baseline  · Probable secondary to CKD 4  · Monitor closely  · Nephrology notes may benefit from outpatient iron infusions when off of antibiotics    Lab Results   Component Value Date    HGB 9 5 (L) 11/02/2022    HGB 9 7 (L) 11/01/2022    HGB 9 3 (L) 10/31/2022

## 2022-11-02 NOTE — PROGRESS NOTES
[address]  Progress Note - Myles Frey 1960, 57 y o  male MRN: [id number]  Unit/Bed#: -Guillermo Encounter: [id number]  Primary Care Provider: Blanka Burciaga MD   Date and time admitted to hospital: 10/29/2022  8:12 PM    * Sepsis [facility])  Assessment & Plan  · Febrile (102), leukocytosis (14), tachycardia Secondary to cellulitis of foot-suspected osteomyelitis  · Continue IV antibiotic  · Podiatry consult appreciated   ·  R 2nd toe partial amputation 11/2 - see more under osteomyelitis   · Follow ID recommendation  · Final abx determined by surgical cure   · Dc vanc and cefazolin and switch to cefepime 2g q 12 hours   · Continue flagyl 500 tid   · Per ID d/c cefe/flagyl start Unasyn 3g Q6  · Clinically improving with resolved leukocytosis, afebrile     Osteomyelitis of foot, right, acute ([facility] [address] )  Assessment & Plan  · Infected diabetic plantar ulcer  · Plantar wound present for 1 month and has been followed by [facility] outpatient  · Vascular duplex obtained bilaterally on 10/13/2022 without evidence of significant stenosis  · Completed ABX course of Keflex as OP  · Developed acute pain, fever, chills, redness and swelling of right foot on 10/28  · Presents with significant edema/erythema of right foot, 2nd and 3rd digits with sausage like appearance  · Leukocytosis, elevated inflammatory markers, febrile   · X-ray right foot- bony erosion of distal 2nd digit  · MRI of the foot shows osteomyelitis  · Podiatry: OR 11/2 right 2nd partial toe amputation and right foot wound debridement  · Infectious disease following   · Wound cultures group G strep, acinetobacter spp  · Switch cefepime/Flagyl > Unasyn 3 g q 6 today  · If surgical cure will require 7 days of p o  Augmentin 875 b i d   Through 11/9      Class 2 obesity in adult  Assessment & Plan  · Continued education on lifestyle modifications    GERD (gastroesophageal reflux disease)  Assessment & Plan  · Continue PPI    Type 2 diabetes mellitus with circulatory disorder, without long-term current use of insulin [facility])  Assessment & Plan  Lab Results   Component Value Date    HGBA1C 6 5 (H) 08/17/2022       Recent Labs     11/01/22 2055 11/02/22  0728 11/02/22  1208 11/02/22  1405   POCGLU 168* 125 115 105       Blood Sugar Average: Last 72 hrs:  (P) 477 [id number]     · Glipizide on hold with CKD 4  ·  sliding scale insulin coverage with Accu-Cheks  · Carbohydrate controlled diet  · Hypoglycemia protocol    Chronic anemia  Assessment & Plan  · Chronic anemia  · Hemoglobin 11 1 on admission appears baseline  · Probable secondary to CKD 4  · Monitor closely  · Nephrology notes may benefit from outpatient iron infusions when off of antibiotics    Lab Results   Component Value Date    HGB 9 5 (L) 11/02/2022    HGB 9 7 (L) 11/01/2022    HGB 9 3 (L) 10/31/2022         Essential hypertension  Assessment & Plan  · Continue PTA metoprolol succinate 100 mg daily  · PTA losartan on hold due to CKD 4  · Trend blood pressures  · Controlled on current regimen, avoid hypotension    Stage 4 chronic kidney disease [facility])  Assessment & Plan  Lab Results   Component Value Date    EGFR 29 11/02/2022    EGFR 30 11/01/2022    EGFR 29 10/31/2022    CREATININE 2 26 (H) 11/02/2022    CREATININE 2 18 (H) 11/01/2022    CREATININE 2 28 (H) 10/31/2022     · Review of care everywhere, patient has CKD with creatinine > 2 since 2020  · Creatinine at baseline on admission- history CKD 4  · Referred to Nephrology by PCP but declined  · Nephrology consulted appreciate recommendations  · Trend creatinine  · Renally dose medications, avoid hypotension, avoid nephrotoxins  · PTA losartan on hold      Acquired hypothyroidism  Assessment & Plan  · Continue PTA levothyroxine 150 mcg daily  · TSH 0 6      VTE Pharmacologic Prophylaxis: VTE Score: 5 High Risk (Score >/= 5) - Pharmacological DVT Prophylaxis Contraindicated  Sequential Compression Devices Ordered    Heparin held per surgery for OR today    Patient Centered Rounds: I performed bedside rounds with nursing staff today  Discussions with Specialists or Other Care Team Provider: podiatry, infectious disease    Education and Discussions with Family / Patient: Patient declined call to   Time Spent for Care: 45 minutes  More than 50% of total time spent on counseling and coordination of care as described above  Current Length of Stay: 4 day(s)  Current Patient Status: Inpatient   Certification Statement: The patient will continue to require additional inpatient hospital stay due to OR today foot wound debridement and partial toe amputation, IV antibiotics pending culture results  Discharge Plan: Anticipate discharge in 24-48 hrs to TBD possible PT evaluation needed postop    Code Status: Level 1 - Full Code    Subjective:   Denies any shortness of breath, chest pain, vomiting fevers chills  Notes intermittent nausea overnight which resolved patient states he is going to the OR today for toe amputation unsure of timing  Discussed will follow cultures closely along with Infectious Disease for antibiotic recommendation    Objective:     Vitals:   Temp (24hrs), Av 2 °F (36 8 °C), Min:97 8 °F (36 6 °C), Max:98 6 °F (37 °C)    Temp:  [97 8 °F (36 6 °C)-98 6 °F (37 °C)] 98 1 °F (36 7 °C)  HR:  [55-71] 62  Resp:  [14-19] 18  BP: (109-140)/(59-80) 128/77  SpO2:  [91 %-98 %] 98 %  Body mass index is 35 44 kg/m²  Input and Output Summary (last 24 hours): Intake/Output Summary (Last 24 hours) at 2022 1602  Last data filed at 2022 1257  Gross per 24 hour   Intake 340 ml   Output 2500 ml   Net -2160 ml       Physical Exam:   Physical Exam  Vitals and nursing note reviewed  Constitutional:       Appearance: He is well-developed  He is obese  He is not ill-appearing  HENT:      Head: Normocephalic and atraumatic        Mouth/Throat:      Mouth: Mucous membranes are moist    Eyes: Conjunctiva/sclera: Conjunctivae normal       Pupils: Pupils are equal, round, and reactive to light  Cardiovascular:      Rate and Rhythm: Normal rate and regular rhythm  Pulses: Normal pulses  Heart sounds: No murmur heard  Pulmonary:      Effort: Pulmonary effort is normal  No respiratory distress  Breath sounds: Normal breath sounds  Abdominal:      General: Bowel sounds are normal  There is no distension  Palpations: Abdomen is soft  Tenderness: There is no abdominal tenderness  Musculoskeletal:      Cervical back: Neck supple  Right lower leg: No edema  Left lower leg: No edema  Skin:     General: Skin is warm and dry  Capillary Refill: Capillary refill takes less than 2 seconds  Comments: Right foot plantar wound dry, toes erythema notes improved edema   Neurological:      General: No focal deficit present  Mental Status: He is alert  Psychiatric:         Mood and Affect: Mood normal          Additional Data:     Labs:  Results from last 7 days   Lab Units 11/02/22  0515 10/31/22  0502 10/30/22  0552   WBC Thousand/uL 6 99   < > 12 20*   HEMOGLOBIN g/dL 9 5*   < > 9 7*   HEMATOCRIT % 29 1*   < > 29 4*   PLATELETS Thousands/uL 222   < > 173   NEUTROS PCT %  --   --  68   LYMPHS PCT %  --   --  17   MONOS PCT %  --   --  13*   EOS PCT %  --   --  1    < > = values in this interval not displayed  Results from last 7 days   Lab Units 11/02/22  0515 10/30/22  0552 10/29/22  2032   SODIUM mmol/L 138   < > 136   POTASSIUM mmol/L 4 7   < > 4 6   CHLORIDE mmol/L 105   < > 101   CO2 mmol/L 25   < > 25   BUN mg/dL 32*   < > 28*   CREATININE mg/dL 2 26*   < > 2 42*   ANION GAP mmol/L 8   < > 10   CALCIUM mg/dL 8 9   < > 8 5   ALBUMIN g/dL  --   --  3 3*   TOTAL BILIRUBIN mg/dL  --   --  0 99   ALK PHOS U/L  --   --  67   ALT U/L  --   --  30   AST U/L  --   --  16   GLUCOSE RANDOM mg/dL 123   < > 151*    < > = values in this interval not displayed  Results from last 7 days   Lab Units 11/02/22  1405 11/02/22  1208 11/02/22  0728 11/01/22  2055 11/01/22  1615 11/01/22  1055 11/01/22  0726 10/31/22  2126 10/31/22  1553 10/31/22  1051 10/31/22  0711 10/30/22  2054   POC GLUCOSE mg/dl 105 115 125 168* 118 202* 119 131 104 145* 105 167*         Results from last 7 days   Lab Units 10/29/22  2032   LACTIC ACID mmol/L 1 2   PROCALCITONIN ng/ml 0 27*       Lines/Drains:  Invasive Devices  Report    Peripheral Intravenous Line  Duration           Peripheral IV 11/01/22 Right Hand <1 day                      Imaging: Reviewed radiology reports from this admission including: xray(s), ultrasound(s) and mri right foot    Recent Cultures (last 7 days):   Results from last 7 days   Lab Units 10/29/22  2032   BLOOD CULTURE  No Growth at 48 hrs  No Growth at 48 hrs     GRAM STAIN RESULT  4+ Gram positive cocci in pairs and chains*  4+ Gram negative coccobacilli*  No polys seen*   WOUND CULTURE  4+ Growth of Beta Hemolytic Streptococcus Group G*  1+ Growth of - Presumptive Acinetobacter courvalinii - Acinetobacter species*       Last 24 Hours Medication List:   Current Facility-Administered Medications   Medication Dose Route Frequency Provider Last Rate   • allopurinol  100 mg Oral Daily Sunshine Messer DPM     • aluminum-magnesium hydroxide-simethicone  30 mL Oral Q4H PRN Araceli Willoughby DPM     • ampicillin-sulbactam  3 g Intravenous Q6H DESTINEE Valentino 3 g (11/02/22 1437)   • aspirin  81 mg Oral Daily Araceli Willoughby DPM     • HYDROcodone-acetaminophen  1 tablet Oral Q6H PRN Araceli Willoughby DPM     • insulin lispro  1-6 Units Subcutaneous TID AC Araceli Willoughby DPM     • insulin lispro  1-6 Units Subcutaneous HS Araceli Willoughby DPM     • levothyroxine  150 mcg Oral Early Morning Araceli Willoughby DPM     • metoprolol succinate  100 mg Oral Daily Sunshine Messer DPM     • pantoprazole  40 mg Oral Early Morning Araceli Willoughby DPM     • sodium chloride  1 spray Each Nare Q4H PATRICK Spears DPM          Today, Patient Was Seen By: Comfort Noe    **Please Note: This note may have been constructed using a voice recognition system  **

## 2022-11-02 NOTE — OP NOTE
OPERATIVE REPORT  PATIENT NAME: Ameena Busch    :  1960  MRN: [id number]  Pt Location: OW OR ROOM 02    SURGERY DATE: 2022    Surgeon(s) and Role:     * Klever Mejia DPM - Primary    Preop Diagnosis:  Osteomyelitis of foot, right, acute ([address] ) Jade Ramus    Post-Op Diagnosis Codes:     * Osteomyelitis of foot, right, acute ([facility] [address] ) [M86 171]    Procedure(s) (LRB):  RIGHT 2ND TOE PARTIAL AMPUTATION and right foot wound debridement (Right)    Specimen(s):  ID Type Source Tests Collected by Time Destination   1 : partial right second toe Tissue Toe, Right TISSUE EXAM Klever Mejia DPM 2022 1241        Estimated Blood Loss:   Minimal    Drains:  * No LDAs found *    Anesthesia Type:   IV Sedation with Anesthesia    Operative Indications:  Osteomyelitis of foot, right, acute ([facility] [address] ) Jade Ramus  Right plantar foot diabetic ulceration    Operative Findings:    1  Right distal phalanx 2nd toe soft, necrotic, infected appearing bone s/p amputation at PIPJ  Surgical cure presumed for osteomyelitis of 2nd toe  2  Plantar right forefoot ulceration (sub metatarsal 2/3) with fat layer exposed  No purulence or direct probe to bone  OK for d/c from podiatry PO to f/u at [facility] wound care  WBAT toe unloading shoe  Abx per ID  Complications:   None    Procedure and Technique:    Under mild sedation, the patient was brought into the operating room and remained on the stretcher in the supine position  A pneumatic ankle tourniquet was then placed around the patient's right ankle with ample webril padding  A time out was performed to confirm the correct patient, procedure and site with all parties in agreement  Following IV sedation, an ankle block was performed consisting of 4 ml of 1% Lidocaine and 0 25% Bupivacaine in a 1:1 mixture  The foot was then scrubbed, prepped and draped in the usual aseptic manner      Attention was directed to the right 2nd toe with noted distal tip ulceration and MRI revealing osteomyelitis of the distal phalanx  A fishmouth type incision was made through skin straight to bone with a 15 blade just distal to the proximal interphalangeal joint  The distal toe was then disarticulated at the proximal interphalangeal joint and passed off the table for gross pathology  The distal phalanx of the toe was assessed on the back table and noted to appear soft, necrotic and infected  Attention was directed back to the surgical site, the remaining proximal phalanx of the toe appeared viable, hard and healthy without signs of infection  The wound was flushed with copious amounts of normal sterile saline  Skin was reapproximated with nylon  Xeroform dry dressing applied  Attention was directed to the plantar aspect of the right foot with noted ulceration measuring 1 0x1 0x0 5cm (pre-debridement)  Ulceration was excisionally debrided with 15 blade and currette of all nonviable, devitalized, slough/fibrin/fibrous, hyperkeratotic tissue w/ excision of fascia, subcutaneous tissue, soft tissue, fat to depth of fat  Post debridement wound measurements 1 0x1 0x1 0cm with undermining at 5 o'clock position measuring 2 0cm, with appearance of wound fresh bleeding tissue, viable, granular, brisk pinpoint bleeding with viable 100% vs nonviable 0%, <20sq cm debrided  The wound was flushed with copious amounts of normal sterile saline  Iodoform packing placed with dry dressing over top  The patient tolerated the procedure and anesthesia well and was transported to the PACU with vital signs stable  As with many limb salvage procedures, we contemplate the possibility of performing further stages to this procedure  Procedures may include debridements, delayed closure, plastic surgery techniques, or more proximal amputations  This procedure may be considered part of a multi-staged limb salvage treatment plan        I was present for the entire procedure    Patient Disposition:  PACU SIGNATURE: Tere Douglas DPM  DATE: November 2, 2022  TIME: 1:05 PM

## 2022-11-02 NOTE — ASSESSMENT & PLAN NOTE
· Infected diabetic plantar ulcer  · Plantar wound present for 1 month and has been followed by [facility] outpatient  · Vascular duplex obtained bilaterally on 10/13/2022 without evidence of significant stenosis  · Completed ABX course of Keflex as OP  · Developed acute pain, fever, chills, redness and swelling of right foot on 10/28  · Presents with significant edema/erythema of right foot, 2nd and 3rd digits with sausage like appearance  · Leukocytosis, elevated inflammatory markers, febrile   · X-ray right foot- bony erosion of distal 2nd digit  · MRI of the foot shows osteomyelitis  · Podiatry: OR 11/2 right 2nd partial toe amputation and right foot wound debridement  · Infectious disease following   · Wound cultures group G strep, acinetobacter spp  · Switch cefepime/Flagyl > Unasyn 3 g q 6 today  · If surgical cure will require 7 days of p o  Augmentin 875 b i d   Through 11/9

## 2022-11-02 NOTE — ANESTHESIA POSTPROCEDURE EVALUATION
Post-Op Assessment Note    CV Status:  Stable  Pain Score: 0    Pain management: adequate     Mental Status:  Alert and awake   Hydration Status:  Euvolemic   PONV Controlled:  Controlled   Airway Patency:  Patent      Post Op Vitals Reviewed: Yes      Staff: CRNA         No complications documented      BP   109/59   Temp  98 9   Pulse  65   Resp   19   SpO2   99

## 2022-11-02 NOTE — ASSESSMENT & PLAN NOTE
· Continue PTA metoprolol succinate 100 mg daily  · PTA losartan on hold due to CKD 4  · Trend blood pressures  · Controlled on current regimen, avoid hypotension

## 2022-11-02 NOTE — ASSESSMENT & PLAN NOTE
· Febrile (102), leukocytosis (14), tachycardia Secondary to cellulitis of foot-suspected osteomyelitis  · Continue IV antibiotic  · Podiatry consult appreciated   ·  R 2nd toe partial amputation 11/2 - see more under osteomyelitis   · Follow ID recommendation  · Final abx determined by surgical cure   · Dc vanc and cefazolin and switch to cefepime 2g q 12 hours   · Continue flagyl 500 tid   · Per ID d/c cefe/flagyl start Unasyn 3g Q6  · Clinically improving with resolved leukocytosis, afebrile

## 2022-11-02 NOTE — PROGRESS NOTES
Progress Note - Nephrology   Daryl Ordaz 57 y o  male MRN: [id number]  Unit/Bed#: -01 Encounter: [id number]    A/P:  1  Chronic kidney disease stage 4 with baseline creatinine between 2 2-2 4 mg/dL              Patient baseline creatinine, continue to avoid potential nephrotoxins and optimize overall hemodynamics  Avoid hypotension and the surgical setting as best as possible to  2  Proteinuria              As noted previously, the patient will likely benefit from an SGOT-2 inhibitor in the outpatient setting, will wait until he is back in his usual state health  3  Probable underlying diabetic nephropathy              Continue to optimize care at this time  4  Hypertension the setting of chronic kidney disease stage 4              Blood pressures are appropriate, continue to avoid hypotensive symptoms in the perioperative timeframe  5  Iron deficiency anemia               as mentioned previously, the patient qualifies for iron infusions, however currently contraindicated due to active infection  Once he is recovered from his infection, may be beneficial to offer iron infusions in the outpatient setting  6  Osteomyelitis of the right foot   Patient is scheduled for surgical intervention later today  Follow up reason for today's visit:  Chronic kidney disease stage 4/proteinuria/diabetic nephropathy/hypertension    Sepsis [facility])    Patient Active Problem List   Diagnosis   • Osteomyelitis of foot, right, acute ([facility] [address] )   • Sepsis ([facility] [address] )   • Acquired hypothyroidism   • Stage 4 chronic kidney disease ([facility] [address] )   • Essential hypertension   • Chronic anemia   • Type 2 diabetes mellitus with circulatory disorder, without long-term current use of insulin ([facility])         Subjective:   Patient with no issues at this time, he is NPO currently for surgical procedure later today  Objective:     Vitals: Blood pressure 128/80, pulse 69, temperature 98 1 °F (36 7 °C), resp   rate 14, height 5' 10" (1 778 m), weight 112 kg (247 lb 2 2 oz), SpO2 91 %  ,Body mass index is 35 46 kg/m²  Weight (last 2 days)     None            Intake/Output Summary (Last 24 hours) at 11/2/2022 0949  Last data filed at 11/2/2022 0749  Gross per 24 hour   Intake 265 ml   Output 2600 ml   Net -2335 ml     I/O last 3 completed shifts: In: 1080 [P O :1080]  Out: 6075 [Urine:6075]         Physical Exam: /80   Pulse 69   Temp 98 1 °F (36 7 °C)   Resp 14   Ht 5' 10" (1 778 m)   Wt 112 kg (247 lb 2 2 oz)   SpO2 91%   BMI 35 46 kg/m²     General Appearance:    Alert, cooperative, no distress, appears stated age   Head:    Normocephalic, without obvious abnormality, atraumatic   Eyes:    Conjunctiva/corneas clear   Ears:    Normal external ears   Nose:   Nares normal, septum midline, mucosa normal, no drainage    or sinus tenderness   Throat:   Lips, mucosa, and tongue normal; teeth and gums normal   Neck:   Supple   Back:     Symmetric, no curvature, ROM normal, no CVA tenderness   Lungs:     Clear to auscultation bilaterally, respirations unlabored   Chest wall:    No tenderness or deformity   Heart:    Regular rate and rhythm, S1 and S2 normal, no murmur, rub   or gallop   Abdomen:     Soft, non-tender, bowel sounds active   Extremities:   Extremities normal, atraumatic, no cyanosis, trace bilateral lower extremity edema   Skin:   Skin color, texture, turgor normal, no rashes or lesions   Lymph nodes:   Cervical normal   Neurologic:   CNII-XII intact            Lab, Imaging and other studies: I have personally reviewed pertinent labs    CBC:   Lab Results   Component Value Date    WBC 6 99 11/02/2022    HGB 9 5 (L) 11/02/2022    HCT 29 1 (L) 11/02/2022     (H) 11/02/2022     11/02/2022    MCH 33 2 11/02/2022    MCHC 32 6 11/02/2022    RDW 12 3 11/02/2022    MPV 9 4 11/02/2022     CMP:   Lab Results   Component Value Date    K 4 7 11/02/2022     11/02/2022    CO2 25 11/02/2022    BUN 32 (H) 11/02/2022    CREATININE 2 26 (H) 11/02/2022    CALCIUM 8 9 11/02/2022    EGFR 29 11/02/2022         Results from last 7 days   Lab Units 11/02/22  0515 11/01/22  1349 10/31/22  0502 10/30/22  0552 10/29/22  2032   POTASSIUM mmol/L 4 7 4 5 4 5   < > 4 6   CHLORIDE mmol/L 105 103 106   < > 101   CO2 mmol/L 25 27 25   < > 25   BUN mg/dL 32* 30* 30*   < > 28*   CREATININE mg/dL 2 26* 2 18* 2 28*   < > 2 42*   CALCIUM mg/dL 8 9 9 0 8 3   < > 8 5   ALK PHOS U/L  --   --   --   --  67   ALT U/L  --   --   --   --  30   AST U/L  --   --   --   --  16    < > = values in this interval not displayed  Phosphorus: No results found for: PHOS  Magnesium:   Lab Results   Component Value Date    MG 1 9 11/01/2022     Urinalysis: No results found for: Edger Look, SPECGRAV, PHUR, LEUKOCYTESUR, NITRITE, PROTEINUA, GLUCOSEU, KETONESU, BILIRUBINUR, BLOODU  Ionized Calcium: No results found for: CAION  Coagulation: No results found for: PT, INR, APTT  Troponin: No results found for: TROPONINI  ABG: No results found for: PHART, PVZ3VSA, PO2ART, CMM7CHK, H8PJXKPD, BEART, SOURCE  Radiology review:     IMAGING  Procedure: MRI foot/forefoot toes right wo contrast    Result Date: 10/31/2022  Narrative: MRI FOOT/FOREFOOT TOES RIGHT WO CONTRAST INDICATION:   Right foot infection  COMPARISON: Correlation is made with the prior radiograph dated 10/29/2022  TECHNIQUE:  MR sequences were obtained of the right foot including the following:  Localizer, sagittal T1/STIR, coronal T1/T2 fat/ sat/STIR, axial T2 fat sat/STIR/PD  Images were acquired on a 1 5 Geeta unit  Gadolinium was not used FINDINGS: SUBCUTANEOUS TISSUES: There is ulceration along the tip of the 2nd toe with surrounding cellulitis  No well-defined abscess seen  There is a plantar surface ulceration along the forefoot at the level of the 3rd MTP joint  There is surrounding cellulitis without discrete abscess  There is dorsal subcutaneous edema   BONES:  There is patchy decreased T1 and increased T2 signal through the distal half of the 2nd toe distal phalanx consistent with osteomyelitis  There is minimal marrow edema within the 3rd toe proximal phalanx without confluent decreased T1 marrow signal to suggest osteomyelitis  This minimal marrow edema is nonspecific  FIRST MTP JOINT:  Mild osteoarthrosis with mild hallux valgus  SESAMOID BONES:  Intact  OTHER ARTICULAR SURFACE:  There is a small joint effusion seen within the 3rd and 4th MTP joints  PLANTAR FASCIA:  Intact  LISFRANC LIGAMENT:  Intact  FOREFOOT TENDONS: Intact  INTERMETATARSAL REGIONS: No bursitis or Alicea's neuroma  MUSCULATURE: There is prominent fatty atrophy of the tarsal muscles  Impression: Ulceration tip of the 2nd toe with osteomyelitis involving the distal half of the 2nd toe distal phalanx  Plantar soft tissue ulcer along the forefoot at the level of the 3rd MTP joint  There is minimal marrow edema within the 3rd proximal phalanx, nonspecific without confluent decreased T1 marrow signal to definitively suggest osteomyelitis  Small joint effusions seen within the 3rd and 4th MTP joints  Mild 1st MTP joint osteoarthrosis and moderate hallux valgus  Prominent fatty atrophy of the tarsal muscles  Workstation performed: [id number]     Procedure: US kidney and bladder    Result Date: 10/31/2022  Narrative: RENAL ULTRASOUND INDICATION:   CKD  COMPARISON: November 7, 2005 TECHNIQUE:   Ultrasound of the retroperitoneum was performed with a curvilinear transducer utilizing volumetric sweeps and still imaging techniques  FINDINGS: KIDNEYS: Symmetric and normal size  Right kidney:  11 1 x 5 5 x 5 3 cm  Volume 169 1 mL Left kidney:  10 6 x 5 4 x 5 0 cm  Volume 148 6 mL Right kidney Normal echogenicity and contour  No mass is identified  No hydronephrosis  No shadowing calculi  No perinephric fluid collections  Left kidney Normal echogenicity and contour  No mass is identified  No hydronephrosis  No shadowing calculi   No perinephric fluid collections  URETERS: Nonvisualized  BLADDER: Incompletely distended No focal thickening or mass lesions  Bilateral ureteral jets detected  Impression: Unremarkable exam  Workstation performed: [id number]       Current Facility-Administered Medications   Medication Dose Route Frequency   • allopurinol (ZYLOPRIM) tablet 100 mg  100 mg Oral Daily   • aluminum-magnesium hydroxide-simethicone (MYLANTA) oral suspension 30 mL  30 mL Oral Q4H PRN   • aspirin (ECOTRIN LOW STRENGTH) EC tablet 81 mg  81 mg Oral Daily   • cefepime (MAXIPIME) IVPB (premix in dextrose) 2,000 mg 50 mL  2,000 mg Intravenous Q12H   • HYDROcodone-acetaminophen (NORCO) 5-325 mg per tablet 1 tablet  1 tablet Oral Q6H PRN   • insulin lispro (HumaLOG) 100 units/mL subcutaneous injection 1-6 Units  1-6 Units Subcutaneous TID AC   • insulin lispro (HumaLOG) 100 units/mL subcutaneous injection 1-6 Units  1-6 Units Subcutaneous HS   • levothyroxine tablet 150 mcg  150 mcg Oral Early Morning   • metoprolol succinate (TOPROL-XL) 24 hr tablet 100 mg  100 mg Oral Daily   • metroNIDAZOLE (FLAGYL) IVPB (premix) 500 mg 100 mL  500 mg Intravenous Q8H   • pantoprazole (PROTONIX) EC tablet 40 mg  40 mg Oral Early Morning   • sodium chloride (OCEAN) 0 65 % nasal spray 1 spray  1 spray Each Nare Q4H PRN     Medications Discontinued During This Encounter   Medication Reason   • atenolol (TENORMIN) 50 mg tablet    • influenza vaccine, recombinant, quadrivalent (FLUBLOK) IM injection 0 5 mL    • LORazepam (ATIVAN) injection 0 5 mg    • ondansetron (ZOFRAN) injection 4 mg    • heparin (porcine) subcutaneous injection 5,000 Units    • vancomycin (VANCOCIN) 1,750 mg in sodium chloride 0 9 % 500 mL IVPB    • ceFAZolin (ANCEF) IVPB (premix in dextrose) 2,000 mg 50 mL        Dossie Kandace      This progress note was produced in part using a dictation device which may document imprecise wording from author's original intent

## 2022-11-02 NOTE — ASSESSMENT & PLAN NOTE
Lab Results   Component Value Date    EGFR 29 11/02/2022    EGFR 30 11/01/2022    EGFR 29 10/31/2022    CREATININE 2 26 (H) 11/02/2022    CREATININE 2 18 (H) 11/01/2022    CREATININE 2 28 (H) 10/31/2022     · Review of care everywhere, patient has CKD with creatinine > 2 since 2020  · Creatinine at baseline on admission- history CKD 4  · Referred to Nephrology by PCP but declined  · Nephrology consulted appreciate recommendations  · Trend creatinine  · Renally dose medications, avoid hypotension, avoid nephrotoxins  · PTA losartan on hold

## 2022-11-02 NOTE — PROGRESS NOTES
Progress Note - Infectious Disease   Freddy Rosado 57 y o  male MRN: [id number]  Unit/Bed#: -01 Encounter: [id number]        REQUIRED DOCUMENTATION:     1  This service was provided via Telemedicine  2  Provider located at [facility]  3  TeleMed provider: Ebony Owens MD   4  Identify all parties in room with patient during tele consult:RN  5  After connecting through [employer], patient was identified by name and date of birth and assistant checked wristband  Patient was then informed that this was a Telemedicine visit and that the exam was being conducted confidentially over secure lines  My office door was closed  No one else was in the room  Patient acknowledged consent and understanding of privacy and security of the Telemedicine visit, and gave us permission to have the assistant stay in the room in order to assist with the history and to conduct the exam   I informed the patient that I have reviewed their record in Epic and presented the opportunity for them to ask any questions regarding the visit today  The patient agreed to participate  Assessment/Recommendations     1  Sepsis, present on admission  - fever, tachycardia, leukocytosis  - Source of sepsis is right diabetic foot infection  - Clinically improving, afebrile without leukocytosis     · Management as below     2  Right foot cellulitis  - in the setting of chronic 2nd toe and midfoot ulcer  - MRI with distal toe osteo but no definitive osteo in the midfoot  - wound cx with Group G strep, Acinetobacter spp  - clinically mildly improving     · Discontinue cefepime, Flagyl and transition to Amp-sulbactam 3 q6h  · Serial extremity exams, await operative course     3   Right distal phalangeal osteo  - suspected clinically with probe to bone and on MRI  - contiguous infection from overlying ulcer  - LEADs wnl     · Await operative course, amputation of distal phalanx will likely be a surgical cure and will require 7 days of po augmentin 875 bid for soft tissue coverage through      4  Type 2 diabetes, neuropathy  - A1C 6 5  - Risk factor for infection, poor wound healing     · Recommend strict glycemic control to aid with wound healing     5  CKD stage 4  - creatinine stable     · Continue renal dosing of abx       Discussed with the primary service  History       Subjective: The patient has no complaints  Denies fevers, chills, or sweats  Denies nausea, vomiting, or diarrhea  Antibiotics:  Cefepime, flagyl      Physical Exam     Temp:  [98 1 °F (36 7 °C)-98 6 °F (37 °C)] 98 1 °F (36 7 °C)  HR:  [69-71] 69  Resp:  [14-18] 14  BP: (123-140)/(66-80) 128/80  SpO2:  [91 %-96 %] 91 %  Temp (24hrs), Av 4 °F (36 9 °C), Min:98 1 °F (36 7 °C), Max:98 6 °F (37 °C)  Current: Temperature: 98 1 °F (36 7 °C)    Intake/Output Summary (Last 24 hours) at 2022 0935  Last data filed at 2022 8800  Gross per 24 hour   Intake 265 ml   Output 2600 ml   Net -2335 ml       Physical exam findings reported by bedside and primary medical team staff      General Appearance:  Appearing well, nontoxic, and in no distress, appears stated age   Throat: Oropharynx moist without lesions; lips, mucosa, and tongue normal; teeth and gums normal   Lungs:   Clear to auscultation bilaterally, no audible wheezes, rhonchi and rales, respirations unlabored   Heart:  Regular rate and rhythm, S1, S2 normal, no murmur, rub or gallop   Abdomen:   Soft, non-tender, non-distended, positive bowel sounds, no masses, no organomegaly    No CVA tenderness   Extremities: Extremities normal, atraumatic, no cyanosis, clubbing  R foot edema, iomproving   Skin: Skin color, texture, turgor normal, no rashes     R foot in dressing   Neurologic: Alert and oriented times 3, extremity strength 5/5 and symmetric         Invasive Devices:   Peripheral IV 22 Right Hand (Active)   Site Assessment WDL 22 0800   Dressing Type Transparent 22 0800   Line Status Flushed;Saline locked 11/02/22 0800   Dressing Status Clean;Dry; Intact 11/02/22 0800   Dressing Change Due 11/05/22 11/01/22 6235       Labs, Imaging, & Other Studies     Lab Results:    I have personally reviewed pertinent labs  Results from last 7 days   Lab Units 11/02/22  0515 11/01/22  1349 10/31/22  0502   WBC Thousand/uL 6 99 6 92 10 49*   HEMOGLOBIN g/dL 9 5* 9 7* 9 3*   PLATELETS Thousands/uL 222 205 179     Results from last 7 days   Lab Units 11/02/22  0515 10/30/22  0552 10/29/22  2032   POTASSIUM mmol/L 4 7   < > 4 6   CHLORIDE mmol/L 105   < > 101   CO2 mmol/L 25   < > 25   BUN mg/dL 32*   < > 28*   CREATININE mg/dL 2 26*   < > 2 42*   EGFR ml/min/1 73sq m 29   < > 27   CALCIUM mg/dL 8 9   < > 8 5   AST U/L  --   --  16   ALT U/L  --   --  30   ALK PHOS U/L  --   --  67    < > = values in this interval not displayed  Results from last 7 days   Lab Units 10/29/22  2032   BLOOD CULTURE  No Growth at 48 hrs  No Growth at 48 hrs  GRAM STAIN RESULT  4+ Gram positive cocci in pairs and chains*  4+ Gram negative coccobacilli*  No polys seen*   WOUND CULTURE  4+ Growth of Beta Hemolytic Streptococcus Group G*  1+ Growth of - Presumptive Acinetobacter courvalinii - Acinetobacter species*       Imaging Studies:   I have personally reviewed pertinent imaging study reports and images in PACS  EKG, Pathology, and Other Studies:   I have personally reviewed pertinent reports  Counseling/Coordination of care: Total 35 minutes communication with the patient via telehealth  Labs, medical tests and imaging studies were independently reviewed by me as noted above

## 2022-11-02 NOTE — PLAN OF CARE
Problem: INFECTION - ADULT  Goal: Absence or prevention of progression during hospitalization  Description: INTERVENTIONS:  - Assess and monitor for signs and symptoms of infection  - Monitor lab/diagnostic results  - Monitor all insertion sites, i e  indwelling lines, tubes, and drains  - Monitor endotracheal if appropriate and nasal secretions for changes in amount and color  - [place] appropriate cooling/warming therapies per order  - Administer medications as ordered  - Instruct and encourage patient and family to use good hand hygiene technique  - Identify and instruct in appropriate isolation precautions for identified infection/condition  Outcome: Progressing

## 2022-11-03 VITALS
HEIGHT: 70 IN | BODY MASS INDEX: 35.36 KG/M2 | SYSTOLIC BLOOD PRESSURE: 131 MMHG | RESPIRATION RATE: 16 BRPM | WEIGHT: 247 LBS | OXYGEN SATURATION: 96 % | TEMPERATURE: 98.1 F | HEART RATE: 66 BPM | DIASTOLIC BLOOD PRESSURE: 86 MMHG

## 2022-11-03 LAB
ANION GAP SERPL CALCULATED.3IONS-SCNC: 8 MMOL/L (ref 4–13)
BASOPHILS # BLD AUTO: 0.03 THOUSANDS/ÂΜL (ref 0–0.1)
BASOPHILS NFR BLD AUTO: 1 % (ref 0–1)
BUN SERPL-MCNC: 30 MG/DL (ref 5–25)
CALCIUM SERPL-MCNC: 8.5 MG/DL (ref 8.3–10.1)
CHLORIDE SERPL-SCNC: 102 MMOL/L (ref 96–108)
CO2 SERPL-SCNC: 26 MMOL/L (ref 21–32)
CREAT SERPL-MCNC: 2.19 MG/DL (ref 0.6–1.3)
EOSINOPHIL # BLD AUTO: 0.35 THOUSAND/ÂΜL (ref 0–0.61)
EOSINOPHIL NFR BLD AUTO: 6 % (ref 0–6)
ERYTHROCYTE [DISTWIDTH] IN BLOOD BY AUTOMATED COUNT: 12.3 % (ref 11.6–15.1)
GFR SERPL CREATININE-BSD FRML MDRD: 30 ML/MIN/1.73SQ M
GLUCOSE SERPL-MCNC: 110 MG/DL (ref 65–140)
GLUCOSE SERPL-MCNC: 112 MG/DL (ref 65–140)
GLUCOSE SERPL-MCNC: 227 MG/DL (ref 65–140)
HCT VFR BLD AUTO: 29.2 % (ref 36.5–49.3)
HGB BLD-MCNC: 9.5 G/DL (ref 12–17)
IMM GRANULOCYTES # BLD AUTO: 0.05 THOUSAND/UL (ref 0–0.2)
IMM GRANULOCYTES NFR BLD AUTO: 1 % (ref 0–2)
LYMPHOCYTES # BLD AUTO: 1.24 THOUSANDS/ÂΜL (ref 0.6–4.47)
LYMPHOCYTES NFR BLD AUTO: 21 % (ref 14–44)
MCH RBC QN AUTO: 33.6 PG (ref 26.8–34.3)
MCHC RBC AUTO-ENTMCNC: 32.5 G/DL (ref 31.4–37.4)
MCV RBC AUTO: 103 FL (ref 82–98)
MONOCYTES # BLD AUTO: 0.62 THOUSAND/ÂΜL (ref 0.17–1.22)
MONOCYTES NFR BLD AUTO: 10 % (ref 4–12)
NEUTROPHILS # BLD AUTO: 3.69 THOUSANDS/ÂΜL (ref 1.85–7.62)
NEUTS SEG NFR BLD AUTO: 61 % (ref 43–75)
NRBC BLD AUTO-RTO: 0 /100 WBCS
PLATELET # BLD AUTO: 234 THOUSANDS/UL (ref 149–390)
PMV BLD AUTO: 9.9 FL (ref 8.9–12.7)
POTASSIUM SERPL-SCNC: 4.9 MMOL/L (ref 3.5–5.3)
RBC # BLD AUTO: 2.83 MILLION/UL (ref 3.88–5.62)
SODIUM SERPL-SCNC: 136 MMOL/L (ref 135–147)
WBC # BLD AUTO: 5.98 THOUSAND/UL (ref 4.31–10.16)

## 2022-11-03 RX ORDER — POLYETHYLENE GLYCOL 3350 17 G/17G
17 POWDER, FOR SOLUTION ORAL DAILY
Status: DISCONTINUED | OUTPATIENT
Start: 2022-11-03 | End: 2022-11-03 | Stop reason: HOSPADM

## 2022-11-03 RX ORDER — DOCUSATE SODIUM 100 MG/1
100 CAPSULE, LIQUID FILLED ORAL 2 TIMES DAILY
Status: DISCONTINUED | OUTPATIENT
Start: 2022-11-03 | End: 2022-11-03 | Stop reason: HOSPADM

## 2022-11-03 RX ORDER — HYDROCODONE BITARTRATE AND ACETAMINOPHEN 5; 325 MG/1; MG/1
1 TABLET ORAL EVERY 8 HOURS PRN
Qty: 9 TABLET | Refills: 0 | Status: SHIPPED | OUTPATIENT
Start: 2022-11-03 | End: 2022-11-06

## 2022-11-03 RX ORDER — AMOXICILLIN AND CLAVULANATE POTASSIUM 875; 125 MG/1; MG/1
1 TABLET, FILM COATED ORAL EVERY 12 HOURS SCHEDULED
Qty: 14 TABLET | Refills: 0 | Status: SHIPPED | OUTPATIENT
Start: 2022-11-03 | End: 2022-11-10

## 2022-11-03 RX ADMIN — INSULIN LISPRO 2 UNITS: 100 INJECTION, SOLUTION INTRAVENOUS; SUBCUTANEOUS at 11:34

## 2022-11-03 RX ADMIN — DOCUSATE SODIUM 100 MG: 100 CAPSULE ORAL at 08:40

## 2022-11-03 RX ADMIN — LEVOTHYROXINE SODIUM 150 MCG: 150 TABLET ORAL at 06:23

## 2022-11-03 RX ADMIN — METOPROLOL SUCCINATE 100 MG: 100 TABLET, EXTENDED RELEASE ORAL at 07:45

## 2022-11-03 RX ADMIN — POLYETHYLENE GLYCOL 3350 17 G: 17 POWDER, FOR SOLUTION ORAL at 08:40

## 2022-11-03 RX ADMIN — AMPICILLIN SODIUM AND SULBACTAM SODIUM 3 G: 2; 1 INJECTION, POWDER, FOR SOLUTION INTRAMUSCULAR; INTRAVENOUS at 02:12

## 2022-11-03 RX ADMIN — ASPIRIN 81 MG: 81 TABLET, COATED ORAL at 07:45

## 2022-11-03 RX ADMIN — PANTOPRAZOLE SODIUM 40 MG: 40 TABLET, DELAYED RELEASE ORAL at 06:23

## 2022-11-03 RX ADMIN — AMPICILLIN SODIUM AND SULBACTAM SODIUM 3 G: 2; 1 INJECTION, POWDER, FOR SOLUTION INTRAMUSCULAR; INTRAVENOUS at 06:53

## 2022-11-03 RX ADMIN — ALLOPURINOL 100 MG: 100 TABLET ORAL at 07:45

## 2022-11-03 NOTE — PLAN OF CARE
Problem: Potential for Falls  Goal: Patient will remain free of falls  Description: INTERVENTIONS:  - Educate patient/family on patient safety including physical limitations  - Instruct patient to call for assistance with activity   - Consult OT/PT to assist with strengthening/mobility   - Keep Call bell within reach  - Keep bed low and locked with side rails adjusted as appropriate  - Keep care items and personal belongings within reach  - Initiate and maintain comfort rounds  - Make Fall Risk Sign visible to staff  - Offer Toileting every 2 Hours, in advance of need  - Initiate/Maintain bed/chair alarm  - Obtain necessary fall risk management equipment:   - Apply yellow socks and bracelet for high fall risk patients  - Consider moving patient to room near nurses station  Outcome: Progressing     Problem: PAIN - ADULT  Goal: Verbalizes/displays adequate comfort level or baseline comfort level  Description: Interventions:  - Encourage patient to monitor pain and request assistance  - Assess pain using appropriate pain scale  - Administer analgesics based on type and severity of pain and evaluate response  - Implement non-pharmacological measures as appropriate and evaluate response  - Consider cultural and social influences on pain and pain management  - Notify physician/advanced practitioner if interventions unsuccessful or patient reports new pain  Outcome: Progressing     Problem: INFECTION - ADULT  Goal: Absence or prevention of progression during hospitalization  Description: INTERVENTIONS:  - Assess and monitor for signs and symptoms of infection  - Monitor lab/diagnostic results  - Monitor all insertion sites, i e  indwelling lines, tubes, and drains  - Monitor endotracheal if appropriate and nasal secretions for changes in amount and color  - [place] appropriate cooling/warming therapies per order  - Administer medications as ordered  - Instruct and encourage patient and family to use good hand hygiene technique  - Identify and instruct in appropriate isolation precautions for identified infection/condition  Outcome: Progressing  Goal: Absence of fever/infection during neutropenic period  Description: INTERVENTIONS:  - Monitor WBC    Outcome: Progressing     Problem: SAFETY ADULT  Goal: Patient will remain free of falls  Description: INTERVENTIONS:  - Educate patient/family on patient safety including physical limitations  - Instruct patient to call for assistance with activity   - Consult OT/PT to assist with strengthening/mobility   - Keep Call bell within reach  - Keep bed low and locked with side rails adjusted as appropriate  - Keep care items and personal belongings within reach  - Initiate and maintain comfort rounds  - Make Fall Risk Sign visible to staff  - Offer Toileting every 2 Hours, in advance of need  - Initiate/Maintain bed/chair alarm  - Obtain necessary fall risk management equipment:   - Apply yellow socks and bracelet for high fall risk patients  - Consider moving patient to room near nurses station  Outcome: Progressing  Goal: Maintain or return to baseline ADL function  Description: INTERVENTIONS:  - Educate patient/family on patient safety including physical limitations  - Instruct patient to call for assistance with activity   - Consult OT/PT to assist with strengthening/mobility   - Keep Call bell within reach  - Keep bed low and locked with side rails adjusted as appropriate  - Keep care items and personal belongings within reach  - Initiate and maintain comfort rounds  - Make Fall Risk Sign visible to staff  - Offer Toileting every 2 Hours, in advance of need  - Initiate/Maintain bed/chair alarm  - Obtain necessary fall risk management equipment:   - Apply yellow socks and bracelet for high fall risk patients  - Consider moving patient to room near nurses station  Outcome: Progressing  Goal: Maintains/Returns to pre admission functional level  Description: INTERVENTIONS:  - Perform BMAT or MOVE assessment daily    - Set and communicate daily mobility goal to care team and patient/family/caregiver  - Collaborate with rehabilitation services on mobility goals if consulted  - Perform Range of Motion - times a day  - Reposition patient every - hours  - Dangle patient - times a day  - Stand patient - times a day  - Ambulate patient - times a day  - Out of bed to chair - times a day   - Out of bed for meals - times a day  - Out of bed for toileting  - Record patient progress and toleration of activity level   Outcome: Progressing     Problem: DISCHARGE PLANNING  Goal: Discharge to home or other facility with appropriate resources  Description: INTERVENTIONS:  - Identify barriers to discharge w/patient and caregiver  - Arrange for needed discharge resources and transportation as appropriate  - Identify discharge learning needs (meds, wound care, etc )  - Arrange for interpretive services to assist at discharge as needed  - Refer to Case Management Department for coordinating discharge planning if the patient needs post-hospital services based on physician/advanced practitioner order or complex needs related to functional status, cognitive ability, or social support system  Outcome: Progressing     Problem: Knowledge Deficit  Goal: Patient/family/caregiver demonstrates understanding of disease process, treatment plan, medications, and discharge instructions  Description: Complete learning assessment and assess knowledge base    Interventions:  - Provide teaching at level of understanding  - Provide teaching via preferred learning methods  Outcome: Progressing     Problem: METABOLIC, FLUID AND ELECTROLYTES - ADULT  Goal: Electrolytes maintained within normal limits  Description: INTERVENTIONS:  - Monitor labs and assess patient for signs and symptoms of electrolyte imbalances  - Administer electrolyte replacement as ordered  - Monitor response to electrolyte replacements, including repeat lab results as appropriate  - Instruct patient on fluid and nutrition as appropriate  Outcome: Progressing  Goal: Fluid balance maintained  Description: INTERVENTIONS:  - Monitor labs   - Monitor I/O and WT  - Instruct patient on fluid and nutrition as appropriate  - Assess for signs & symptoms of volume excess or deficit  Outcome: Progressing  Goal: Glucose maintained within target range  Description: INTERVENTIONS:  - Monitor Blood Glucose as ordered  - Assess for signs and symptoms of hyperglycemia and hypoglycemia  - Administer ordered medications to maintain glucose within target range  - Assess nutritional intake and initiate nutrition service referral as needed  Outcome: Progressing     Problem: SKIN/TISSUE INTEGRITY - ADULT  Goal: Skin Integrity remains intact(Skin Breakdown Prevention)  Description: Assess:  -Perform [place] assessment every shift  -Clean and moisturize skin every shift  -Inspect skin when repositioning, toileting, and assisting with ADLS  -Assess extremities for adequate circulation and sensation     Bed Management:  -Have minimal linens on bed & keep smooth, unwrinkled  -Change linens as needed when moist or perspiring  -Avoid sitting or lying in one position for more than 2 hours while in bed  -Keep HOB at 30degrees     Toileting:  -Offer bedside commode  -Assess for incontinence every 3 hours  -Use incontinent care products after each incontinent episode such as pad/condom cath    Activity:  -Mobilize patient 3 times a day  -Encourage activity and walks on unit  -Encourage or provide ROM exercises   -Turn and reposition patient every 2 Hours  -Use appropriate equipment to lift or move patient in bed  -Instruct/ Assist with weight shifting every 15 minutes when out of bed in chair  -Consider limitation of chair time 3 hour intervals    Skin Care:  -Avoid use of baby powder, tape, friction and shearing, hot water or constrictive clothing  -Relieve pressure over bony prominences using mepilex  -Do not massage red bony areas    Outcome: Progressing  Goal: Incision(s), wounds(s) or drain site(s) healing without S/S of infection  Description: INTERVENTIONS  - Assess and document dressing, incision, wound bed, drain sites and surrounding tissue  - Provide patient and family education  - Perform skin care/dressing changes every shift  Outcome: Progressing     Problem: Nutrition/Hydration-ADULT  Goal: Nutrient/Hydration intake appropriate for improving, restoring or maintaining nutritional needs  Description: Monitor and assess patient's nutrition/hydration status for malnutrition  Collaborate with interdisciplinary team and initiate plan and interventions as ordered  Monitor patient's weight and dietary intake as ordered or per policy  Utilize nutrition screening tool and intervene as necessary  Determine patient's food preferences and provide high-protein, high-caloric foods as appropriate       INTERVENTIONS:  - Monitor oral intake, urinary output, labs, and treatment plans  - Assess nutrition and hydration status and recommend course of action  - Evaluate amount of meals eaten  - Assist patient with eating if necessary   - Allow adequate time for meals  - Recommend/ encourage appropriate diets, oral nutritional supplements, and vitamin/mineral supplements  - Order, calculate, and assess calorie counts as needed  - Recommend, monitor, and adjust tube feedings and TPN/PPN based on assessed needs  - Assess need for intravenous fluids  - Provide specific nutrition/hydration education as appropriate  - Include patient/family/caregiver in decisions related to nutrition  Outcome: Progressing

## 2022-11-03 NOTE — NURSING NOTE
Discharge instructions given to pt  Per podiatry, post op dressing changed per order- cleansed, xeroform, maxorb ag, 4x4, clara, and ace wrap  Instructed patient and wife to leave on, clean/dry until follow up appointment and WBAT with toe offloading shoe  IV site removed  All questions/concerns were acknowledged and answered

## 2022-11-03 NOTE — UTILIZATION REVIEW
Continued Stay Review    Date: 11/03                           Current Patient Class: IP  Current Level of Care: MS      HPI:62 y o  male initially admitted on 10/29 for treatment of  OM along w/ significant cellulitis w/ possible abscess of R foot,   DM   IVABs initiated  11/02   OP NOTE:   RIGHT 2ND TOE PARTIAL AMPUTATION and right foot wound debridement (Right)  Operative Findings:  1  Right distal phalanx 2nd toe soft, necrotic, infected appearing bone s/p amputation at PIPJ  Surgical cure presumed for osteomyelitis of 2nd toe  2  Plantar right forefoot ulceration (sub metatarsal 2/3) with fat layer exposed  No purulence or direct probe to bone       11/02   Post op -  MS level of care  WBAT toe unloading shoe  Abx per ID       11/03   POD 1    foot wound debrided without purulence or direct probe to bone will continue follow-up at [address] [place] [address] next week  Change to PO abx  Vital Signs:   11/03/22 07:13:35 98 1 °F (36 7 °C) 60 16 128/80 96 94 % -- -- Lying   11/03/22 03:18:50 97 9 °F (36 6 °C) 64 16 127/78 94 94 % -- -- --   11/02/22 2300 98 2 °F (36 8 °C) 64 18 109/68 -- 98 % None (Room air)           Pertinent Labs/Diagnostic Results:     11/02   Expected postop appearance of amputation of the 2nd toe middle and distal phalanges         Results from last 7 days   Lab Units 11/03/22  0457 11/02/22  0515 11/01/22  1349 10/31/22  0502 10/30/22  0552 10/29/22  2032   WBC Thousand/uL 5 98 6 99 6 92 10 49* 12 20* 14 70*   HEMOGLOBIN g/dL 9 5* 9 5* 9 7* 9 3* 9 7* 11 1*   HEMATOCRIT % 29 2* 29 1* 29 6* 28 4* 29 4* 33 0*   PLATELETS Thousands/uL 234 222 205 179 173 241   NEUTROS ABS Thousands/µL 3 69  --   --   --  8 42* 10 89*         Results from last 7 days   Lab Units 11/03/22  0457 11/02/22  0515 11/01/22  1349 10/31/22  0502 10/30/22  0552   SODIUM mmol/L 136 138 138 137 139   POTASSIUM mmol/L 4 9 4 7 4 5 4 5 4 5   CHLORIDE mmol/L 102 105 103 106 106   CO2 mmol/L 26 25 27 25 25   ANION GAP mmol/L 8 8 8 6 8   BUN mg/dL 30* 32* 30* 30* 27*   CREATININE mg/dL 2 19* 2 26* 2 18* 2 28* 2 22*   EGFR ml/min/1 73sq m 30 29 30 29 30   CALCIUM mg/dL 8 5 8 9 9 0 8 3 8 0*   MAGNESIUM mg/dL  --   --  1 9  --  1 6   PHOSPHORUS mg/dL  --   --   --   --  3 4     Results from last 7 days   Lab Units 10/29/22  2032   AST U/L 16   ALT U/L 30   ALK PHOS U/L 67   TOTAL PROTEIN g/dL 8 0   ALBUMIN g/dL 3 3*   TOTAL BILIRUBIN mg/dL 0 99     Results from last 7 days   Lab Units 11/02/22 2059 11/02/22  1605 11/02/22  1405 11/02/22  1208 11/02/22  0728 11/01/22  2055 11/01/22  1615 11/01/22  1055 11/01/22  0726 10/31/22  2126 10/31/22  1553 10/31/22  1051   POC GLUCOSE mg/dl 144* 115 105 115 125 168* 118 202* 119 131 104 145*     Results from last 7 days   Lab Units 11/03/22  0457 11/02/22  0515 11/01/22  1349 10/31/22  0502 10/30/22  0552 10/29/22  2032   GLUCOSE RANDOM mg/dL 110 123 152* 121 84 151*       Results from last 7 days   Lab Units 10/30/22  0552   TSH 3RD GENERATON uIU/mL 0 618     Results from last 7 days   Lab Units 10/29/22  2032   PROCALCITONIN ng/ml 0 27*     Results from last 7 days   Lab Units 10/29/22  2032   LACTIC ACID mmol/L 1 2       Results from last 7 days   Lab Units 10/31/22  0502   FERRITIN ng/mL 299     Results from last 7 days   Lab Units 10/30/22  0552   HEP C AB  Non-reactive         Results from last 7 days   Lab Units 10/29/22  2032   CRP mg/L 120 5*   SED RATE mm/hour 71*       Results from last 7 days   Lab Units 10/30/22  1745 10/30/22  0909   CLARITY UA   --  Clear   COLOR UA   --  Yellow   SPEC GRAV UA   --  1 015   PH UA   --  6 0   GLUCOSE UA mg/dl  --  Negative   KETONES UA mg/dl  --  Negative   BLOOD UA   --  Negative   PROTEIN UA mg/dl  --  Negative   NITRITE UA   --  Negative   BILIRUBIN UA   --  Negative   UROBILINOGEN UA E U /dl  --  0 2   LEUKOCYTES UA   --  Negative   WBC UA /hpf  --  None Seen   RBC UA /hpf  --  None Seen   BACTERIA UA /hpf  --  None Seen   EPITHELIAL CELLS WET PREP /hpf  --  None Seen   CREATININE UR mg/dL 87 2  87 2  --    PROTEIN UR mg/dL 32  --    PROT/CREAT RATIO UR  0 37*  --             Results from last 7 days   Lab Units 10/29/22  2032   BLOOD CULTURE  No Growth at 72 hrs  No Growth at 72 hrs  GRAM STAIN RESULT  4+ Gram positive cocci in pairs and chains*  4+ Gram negative coccobacilli*  No polys seen*   WOUND CULTURE  4+ Growth of Beta Hemolytic Streptococcus Group G*  1+ Growth of - Presumptive Acinetobacter courvalinii - Acinetobacter species*                 Medications:   Scheduled Medications:  allopurinol, 100 mg, Oral, Daily  ampicillin-sulbactam, 3 g, Intravenous, Q6H  aspirin, 81 mg, Oral, Daily  insulin lispro, 1-6 Units, Subcutaneous, TID AC  insulin lispro, 1-6 Units, Subcutaneous, HS  levothyroxine, 150 mcg, Oral, Early Morning  metoprolol succinate, 100 mg, Oral, Daily  pantoprazole, 40 mg, Oral, Early Morning      Continuous IV Infusions:     PRN Meds:  aluminum-magnesium hydroxide-simethicone, 30 mL, Oral, Q4H PRN  HYDROcodone-acetaminophen, 1 tablet, Oral, Q6H PRN  sodium chloride, 1 spray, Each Nare, Q4H PRN        Discharge Plan:d    Network Utilization Review Department  ATTENTION: Please call with any questions or concerns to [phone] and carefully listen to the prompts so that you are directed to the right person  All voicemails are confidential   Gary Go all requests for admission clinical reviews, approved or denied determinations and any other requests to dedicated fax number below belonging to the campus where the patient is receiving treatment   List of dedicated fax numbers for the Facilities:  [address] [place] [address] DENIALS (Administrative/Medical Necessity) [phone]   [address] [place] [address] (Maternity/NICU/Pediatrics) [address] [place] [address] [address] [place] [address] [facility] 28 [address] [facility] 310 [facility] [place] [address] [phone]

## 2022-11-03 NOTE — ASSESSMENT & PLAN NOTE
Lab Results   Component Value Date    EGFR 30 11/03/2022    EGFR 29 11/02/2022    EGFR 30 11/01/2022    CREATININE 2 19 (H) 11/03/2022    CREATININE 2 26 (H) 11/02/2022    CREATININE 2 18 (H) 11/01/2022     · Review of care everywhere, patient has CKD with creatinine > 2 since 2020  · Creatinine at baseline on admission- history CKD 4  · Referred to Nephrology by PCP but declined  · Nephrology consulted appreciate recommendations  · Trend creatinine  · Renally dose medications, avoid hypotension, avoid nephrotoxins  · PTA losartan on hold  · Remains at baseline, recommend OP follow-up with nephrology

## 2022-11-03 NOTE — ASSESSMENT & PLAN NOTE
· Infected diabetic plantar ulcer  · Plantar wound present for 1 month and has been followed by [facility] outpatient  · Vascular duplex obtained bilaterally on 10/13/2022 without evidence of significant stenosis  · Completed ABX course of Keflex as OP  · Developed acute pain, fever, chills, redness and swelling of right foot on 10/28  · Presents with significant edema/erythema of right foot, 2nd and 3rd digits with sausage like appearance  · Leukocytosis, elevated inflammatory markers, febrile   · X-ray right foot- bony erosion of distal 2nd digit  · MRI of the foot shows osteomyelitis  · Podiatry: OR 11/2 right 2nd partial toe amputation and right foot wound debridement  · Infectious disease following   · Wound cultures group G strep, acinetobacter spp  · Switch cefepime/Flagyl > Unasyn 3 g q 6 today  · If surgical cure will require 7 days of p o  Augmentin 875 b i d  Through 11/9  · Discussed with podiatry surgical cure of 2nd toe osteomyolitis, foot wound debrided without purulence or direct probe to bone will continue follow-up at [address] [place] [address] next week  · WBAT toe offloading, keep dressing intact until podiatry f/u   No showering right foot or driving

## 2022-11-03 NOTE — ASSESSMENT & PLAN NOTE
· Chronic anemia  · Hemoglobin 11 1 on admission appears baseline  · Probable secondary to CKD 4  · Monitor closely  · Nephrology notes may benefit from outpatient iron infusions when off of antibiotics    Lab Results   Component Value Date    HGB 9 5 (L) 11/03/2022    HGB 9 5 (L) 11/02/2022    HGB 9 7 (L) 11/01/2022

## 2022-11-03 NOTE — PLAN OF CARE
Problem: INFECTION - ADULT  Goal: Absence or prevention of progression during hospitalization  Description: INTERVENTIONS:  - Assess and monitor for signs and symptoms of infection  - Monitor lab/diagnostic results  - Monitor all insertion sites, i e  indwelling lines, tubes, and drains  - Monitor endotracheal if appropriate and nasal secretions for changes in amount and color  - [place] appropriate cooling/warming therapies per order  - Administer medications as ordered  - Instruct and encourage patient and family to use good hand hygiene technique  - Identify and instruct in appropriate isolation precautions for identified infection/condition  Outcome: Progressing

## 2022-11-03 NOTE — DISCHARGE INSTR - AVS FIRST PAGE
See please see podiatry discharge instructions- weight-bearing as tolerated with toe offloading shoe, no driving until cleared by Podiatry, do not shower right foot    Leave dressing dry and intact until follow-up visit   Please call [facility] to schedule follow-up visit next week  Referral placed for Nephrology recommend follow-up for chronic kidney disease surveillance  Lab work ordered for 1 week

## 2022-11-03 NOTE — DISCHARGE SUMMARY
[address]  Discharge- [facility] 1960, 57 y o  male MRN: [id number]  Unit/Bed#: -Guillermo Encounter: [id number]  Primary Care Provider: Bernie Perry MD   Date and time admitted to hospital: 10/29/2022  8:12 PM    * Sepsis [facility])  Assessment & Plan  · Febrile (102), leukocytosis (14), tachycardia Secondary to cellulitis of foot-suspected osteomyelitis  · Continue IV antibiotic  · Podiatry consult appreciated   ·  R 2nd toe partial amputation 11/2 - see more under osteomyelitis   · Follow ID recommendation  · Final abx determined by surgical cure   · Dc vanc and cefazolin and switch to cefepime 2g q 12 hours   · Continue flagyl 500 tid   · Per ID d/c cefe/flagyl start Unasyn 3g Q6  · Clinically improving with resolved leukocytosis, afebrile   resolved    Osteomyelitis of foot, right, acute ([facility] [address] )  Assessment & Plan  · Infected diabetic plantar ulcer  · Plantar wound present for 1 month and has been followed by [facility] outpatient  · Vascular duplex obtained bilaterally on 10/13/2022 without evidence of significant stenosis  · Completed ABX course of Keflex as OP  · Developed acute pain, fever, chills, redness and swelling of right foot on 10/28  · Presents with significant edema/erythema of right foot, 2nd and 3rd digits with sausage like appearance  · Leukocytosis, elevated inflammatory markers, febrile   · X-ray right foot- bony erosion of distal 2nd digit  · MRI of the foot shows osteomyelitis  · Podiatry: OR 11/2 right 2nd partial toe amputation and right foot wound debridement  · Infectious disease following   · Wound cultures group G strep, acinetobacter spp  · Switch cefepime/Flagyl > Unasyn 3 g q 6 today  · If surgical cure will require 7 days of p o  Augmentin 875 b i d   Through 11/9  · Discussed with podiatry surgical cure of 2nd toe osteomyolitis, foot wound debrided without purulence or direct probe to bone will continue follow-up at [address] [place] [address] next week  · WBAT toe offloading, keep dressing intact until podiatry f/u  No showering right foot or driving     Class 2 obesity in adult  Assessment & Plan  · Continued education on lifestyle modifications    GERD (gastroesophageal reflux disease)  Assessment & Plan  · Continue PPI    Type 2 diabetes mellitus with circulatory disorder, without long-term current use of insulin [facility])  Assessment & Plan  Lab Results   Component Value Date    HGBA1C 6 5 (H) 08/17/2022       Recent Labs     11/02/22  1405 11/02/22  1605 11/02/22  2059 11/03/22  0711   POCGLU 105 115 144* 112       Blood Sugar Average: Last 72 hrs:  (P) 129 [id number]     · Glipizide on hold with CKD 4  ·  sliding scale insulin coverage with Accu-Cheks  · Carbohydrate controlled diet  · Hypoglycemia protocol    Would benefit from an SGOT-2 inhibitor in the outpatient setting, will wait until he is back in his usual state health      Chronic anemia  Assessment & Plan  · Chronic anemia  · Hemoglobin 11 1 on admission appears baseline  · Probable secondary to CKD 4  · Monitor closely  · Nephrology notes may benefit from outpatient iron infusions when off of antibiotics    Lab Results   Component Value Date    HGB 9 5 (L) 11/03/2022    HGB 9 5 (L) 11/02/2022    HGB 9 7 (L) 11/01/2022         Essential hypertension  Assessment & Plan  · Continue PTA metoprolol succinate 100 mg daily  · PTA losartan on hold due to CKD 4  · Trend blood pressures  · Controlled on current regimen, avoid hypotension    Stage 4 chronic kidney disease [facility])  Assessment & Plan  Lab Results   Component Value Date    EGFR 30 11/03/2022    EGFR 29 11/02/2022    EGFR 30 11/01/2022    CREATININE 2 19 (H) 11/03/2022    CREATININE 2 26 (H) 11/02/2022    CREATININE 2 18 (H) 11/01/2022     · Review of care everywhere, patient has CKD with creatinine > 2 since 2020  · Creatinine at baseline on admission- history CKD 4  · Referred to Nephrology by PCP but declined  · Nephrology consulted appreciate recommendations  · Trend creatinine  · Renally dose medications, avoid hypotension, avoid nephrotoxins  · PTA losartan on hold  · Remains at baseline, recommend OP follow-up with nephrology    Acquired hypothyroidism  Assessment & Plan  · Continue PTA levothyroxine 150 mcg daily  · TSH 0 6      Medical Problems             Resolved Problems  Date Reviewed: 11/2/2022   None               Discharging Physician / Practitioner: Maren Melton  PCP: Binh Alvarez MD  Admission Date:   Admission Orders (From admission, onward)     Ordered        10/29/22 2117  INPATIENT ADMISSION  Once                      Discharge Date: 11/03/22    Consultations During Hospital Stay:  · Infectious disease  · Podiatry  · Nephrology  · Case management  · Nutrition  · Wound care    Procedures Performed:   · X-ray foot right-no signs of osteomyelitis  · MRI right foot ulceration 2nd toe with osteomyelitis involving distal half of 2nd toe distal phalanx  Plantar soft tissue ulcer level of 3rd MTP joint minimal marrow edema within 3rd proximal phalanx nonspecific without confluent decreased T1 marrow signal to definitively suggest osteomyelitis  Small joint effusion within 3rd and 4th MTP joints, mild 1st MTP joint osteoarthritis and moderate hallux valgus  Fat atrophy tarsal muscles  · U/S kidney bladder:  Unremarkable  · X-ray foot-expected postop changes    · 11/2 right 2nd toe partial amputation and right foot wound debridement    Significant Findings / Test Results:   · Wound cultures:  Beta-hemolytic strep group G, presumptive Acinetobacter couvalinii  · Blood cultures no growth at 72 hours  · WBC 12> 10 > 5    Incidental Findings:   · None    Test Results Pending at Discharge (will require follow up):    · None     Outpatient Tests Requested:  · BMP in 1 week    Complications:  None    Reason for Admission:  Sepsis    Hospital Course:   Moni Acosta is a 57 y o  male patient who originally presented to the hospital on 10/29/2022 due to sepsis presentation admission with fever, leukocytosis and tachycardia secondary to cellulitis of left foot with osteomyelitis MRI confirming as above  Initially placed on IV Ancef/Flagyl completing 3 days and changed to IV Unasyn completing 2 days per Infectious Disease recommendation  Patient taken to OR by Podiatry on 11/02 for right 2nd toe partial amputation and right foot wound debridement  Noted to be surgical cure for osteo and 2nd toe, will continue Augmentin 875 mg b i d  For soft tissue coverage through 11/9 per Infectious Disease recommendations  Patient to follow-up with wound clinic in [facility] next week for further podiatry management and wound care  Weight-bearing as tolerated to right foot with toe offloading shoe, no driving until cleared by Podiatry  Patient also evaluated by Nephrology during admission with CKD 4 creatinine remained at patient's baseline 2 2-2 4 throughout admission recommend outpatient follow-up with Nephrology for surveillance  Noted patient would benefit from iron infusions in the outpatient setting when antibiotic therapy completed  Also noted would benefit from SGOt2 inhibitor in the outpatient setting  Will place referral as patient does not currently follow with Nephrology on an outpatient basis      Please see above list of diagnoses and related plan for additional information  Condition at Discharge: stable    Discharge Day Visit / Exam:   Subjective:  Patient denies any issues overnight discussed Podiatry recommendations add weight-bearing as tolerated status along with follow-up in 1 week at [facility] wound clinic with Podiatry  Discussed Nephrology referral and recommended follow-up    Outpatient  Vitals: Blood Pressure: 128/80 (11/03/22 0713)  Pulse: 60 (11/03/22 0713)  Temperature: 98 1 °F (36 7 °C) (11/03/22 0713)  Temp Source: Temporal (11/03/22 0713)  Respirations: 16 (11/03/22 0713)  Height: 5' 10" (177 8 cm) (11/02/22 1144)  Weight - Scale: 112 kg (247 lb) (11/02/22 1144)  SpO2: 94 % (11/03/22 0713)  Exam:   Physical Exam  Vitals and nursing note reviewed  Constitutional:       Appearance: He is well-developed  He is not ill-appearing  HENT:      Head: Normocephalic and atraumatic  Mouth/Throat:      Mouth: Mucous membranes are moist    Eyes:      Conjunctiva/sclera: Conjunctivae normal       Pupils: Pupils are equal, round, and reactive to light  Cardiovascular:      Rate and Rhythm: Normal rate and regular rhythm  Pulses: Normal pulses  Dorsalis pedis pulses are 2+ on the right side and 2+ on the left side  Posterior tibial pulses are 2+ on the right side and 2+ on the left side  Heart sounds: Normal heart sounds  No murmur heard  Pulmonary:      Effort: Pulmonary effort is normal  No respiratory distress  Breath sounds: Normal breath sounds  Abdominal:      General: Bowel sounds are normal  There is no distension  Palpations: Abdomen is soft  Tenderness: There is no abdominal tenderness  Musculoskeletal:      Cervical back: Neck supple  Right lower leg: Edema present  Feet:      Right foot:      Skin integrity: Warmth present  Comments: - toes warm <3 cap refill, edema and mild erythema at ankle  Skin:     General: Skin is warm and dry  Neurological:      Mental Status: He is alert  Discussion with Family: Patient declined call to   Discharge instructions/Information to patient and family:   See after visit summary for information provided to patient and family  Provisions for Follow-Up Care:  See after visit summary for information related to follow-up care and any pertinent home health orders  Disposition:   Home    Planned Readmission: none     Discharge Statement:  I spent 40 minutes discharging the patient  This time was spent on the day of discharge  I had direct contact with the patient on the day of discharge   Greater than 50% of the total time was spent examining patient, answering all patient questions, arranging and discussing plan of care with patient as well as directly providing post-discharge instructions  Additional time then spent on discharge activities  Discharge Medications:  See after visit summary for reconciled discharge medications provided to patient and/or family        **Please Note: This note may have been constructed using a voice recognition system**

## 2022-11-03 NOTE — PROGRESS NOTES
Progress Note - Nephrology   Gabino Voss 57 y o  male MRN: [id number]  Unit/Bed#: -01 Encounter: [id number]    A/P:  1  Chronic kidney disease stage 4 with baseline creatinine between 2 2-2 4 mg/dL              Patient continues to have stable kidney function with a creatinine at 2 2 mg/deciliter  Continue with potential nephrotoxins  Patient to follow up with this in the outpatient setting  2  Proteinuria              As mentioned previously, the patient likely benefit from SGLT- 2 inhibitor in the future  Would wait until he is in his usual state of health, recover from his recent surgery  Patient will need to be rolled out for monoclonal gammopathy in the outpatient setting  3  Probable underlying diabetic nephropathy              As noted above, potential benefits to initiating an SGLT-2 inhibitor  Further evaluation the outpatient setting  4  Hypertension the setting of chronic kidney disease stage 4              Blood pressures remain appropriate at this time, continue to monitor, continue current medications  5  Iron deficiency anemia               As mentioned previously, patient will ultimately benefit from our infusions if he is unable to tolerate or have adequate iron repletion from oral supplements  6  Osteomyelitis of the right foot POD #1 right second toe partial amputation and right plantar foot wound debridement   Appreciate affect disease recommendations, patient to have an seven additional days of oral Augmentin         Follow up reason for today's visit: CKD 4/Proteinuria/HTN    Sepsis [facility])    Patient Active Problem List   Diagnosis   • Osteomyelitis of foot, right, acute ([facility] [address] )   • Sepsis ([facility] [address] )   • Acquired hypothyroidism   • Stage 4 chronic kidney disease ([facility] [address] )   • Essential hypertension   • Chronic anemia   • Type 2 diabetes mellitus with circulatory disorder, without long-term current use of insulin ([facility])   • GERD (gastroesophageal reflux disease)   • Class 2 obesity in adult Subjective:   Patient is eating and drinking normally with no Nausea or vomiting  Objective:     Vitals: Blood pressure 131/86, pulse 66, temperature 98 1 °F (36 7 °C), resp  rate 16, height 5' 10" (1 778 m), weight 112 kg (247 lb), SpO2 96 %  ,Body mass index is 35 44 kg/m²  Weight (last 2 days)     Date/Time Weight    11/02/22 1144 112 (247)            Intake/Output Summary (Last 24 hours) at 11/3/2022 1413  Last data filed at 11/3/2022 1103  Gross per 24 hour   Intake 1375 ml   Output 2550 ml   Net -1175 ml     I/O last 3 completed shifts: In: 80 [P O :505; I V :75]  Out: 3925 [Urine:3925]         Physical Exam: /86   Pulse 66   Temp 98 1 °F (36 7 °C)   Resp 16   Ht 5' 10" (1 778 m)   Wt 112 kg (247 lb)   SpO2 96%   BMI 35 44 kg/m²     General Appearance:    Alert, cooperative, no distress, appears stated age   Head:    Normocephalic, without obvious abnormality, atraumatic   Eyes:    Conjunctiva/corneas clear   Ears:    Normal external ears   Nose:   Nares normal, septum midline, mucosa normal, no drainage    or sinus tenderness   Throat:   Lips, mucosa, and tongue normal; teeth and gums normal   Neck:   Supple, symmetrical, trachea midline, no adenopathy;        thyroid:  No enlargement/tenderness/nodules; no carotid    bruit or JVD   Back:     Symmetric, no curvature, ROM normal, no CVA tenderness   Lungs:     Clear to auscultation bilaterally, respirations unlabored   Chest wall:    No tenderness or deformity   Heart:    Regular rate and rhythm, S1 and S2 normal, no murmur, rub   or gallop   Abdomen:     Soft, non-tender, bowel sounds active   Extremities:   Extremities normal, atraumatic, no cyanosis, mild bilateral lower extremity edema   Skin:   Skin color, texture, turgor normal, no rashes or lesions   Lymph nodes:   Cervical normal   Neurologic:   CNII-XII intact            Lab, Imaging and other studies: I have personally reviewed pertinent labs    CBC:   Lab Results Component Value Date    WBC 5 98 11/03/2022    HGB 9 5 (L) 11/03/2022    HCT 29 2 (L) 11/03/2022     (H) 11/03/2022     11/03/2022    MCH 33 6 11/03/2022    MCHC 32 5 11/03/2022    RDW 12 3 11/03/2022    MPV 9 9 11/03/2022    NRBC 0 11/03/2022     CMP:   Lab Results   Component Value Date    K 4 9 11/03/2022     11/03/2022    CO2 26 11/03/2022    BUN 30 (H) 11/03/2022    CREATININE 2 19 (H) 11/03/2022    CALCIUM 8 5 11/03/2022    EGFR 30 11/03/2022         Results from last 7 days   Lab Units 11/03/22  0457 11/02/22  0515 11/01/22  1349 10/30/22  0552 10/29/22  2032   POTASSIUM mmol/L 4 9 4 7 4 5   < > 4 6   CHLORIDE mmol/L 102 105 103   < > 101   CO2 mmol/L 26 25 27   < > 25   BUN mg/dL 30* 32* 30*   < > 28*   CREATININE mg/dL 2 19* 2 26* 2 18*   < > 2 42*   CALCIUM mg/dL 8 5 8 9 9 0   < > 8 5   ALK PHOS U/L  --   --   --   --  67   ALT U/L  --   --   --   --  30   AST U/L  --   --   --   --  16    < > = values in this interval not displayed  Phosphorus: No results found for: PHOS  Magnesium: No results found for: MG  Urinalysis: No results found for: [place], SPECGRAV, PHUR, LEUKOCYTESUR, NITRITE, PROTEINUA, GLUCOSEU, KETONESU, BILIRUBINUR, BLOODU  Ionized Calcium: No results found for: CAION  Coagulation: No results found for: PT, INR, APTT  Troponin: No results found for: TROPONINI  ABG: No results found for: PHART, PBX5LPM, PO2ART, UET4LKV, M3LMQYQH, BEART, SOURCE  Radiology review:     IMAGING  Procedure: XR foot right 3+ views    Result Date: 11/2/2022  Narrative: RIGHT FOOT INDICATION:   postop  COMPARISON:  Right foot plain films from 10/29/2022  Right foot MR from 10/31/2022  VIEWS:  XR FOOT 3+ VW RIGHT FINDINGS: There is no acute fracture or dislocation  Expected postop appearance of amputation of the 2nd toe middle and distal phalanges  Orthopedic hardware again seen in the distal tibia and fibula  No significant degenerative changes   No lytic or blastic osseous lesion  Soft tissues are unremarkable  Impression: Expected postop appearance of amputation of the 2nd toe middle and distal phalanges  Workstation performed: [id number]     Procedure: US kidney and bladder    Result Date: 10/31/2022  Narrative: RENAL ULTRASOUND INDICATION:   CKD  COMPARISON: November 7, 2005 TECHNIQUE:   Ultrasound of the retroperitoneum was performed with a curvilinear transducer utilizing volumetric sweeps and still imaging techniques  FINDINGS: KIDNEYS: Symmetric and normal size  Right kidney:  11 1 x 5 5 x 5 3 cm  Volume 169 1 mL Left kidney:  10 6 x 5 4 x 5 0 cm  Volume 148 6 mL Right kidney Normal echogenicity and contour  No mass is identified  No hydronephrosis  No shadowing calculi  No perinephric fluid collections  Left kidney Normal echogenicity and contour  No mass is identified  No hydronephrosis  No shadowing calculi  No perinephric fluid collections  URETERS: Nonvisualized  BLADDER: Incompletely distended No focal thickening or mass lesions  Bilateral ureteral jets detected       Impression: Unremarkable exam  Workstation performed: [id number]       Current Facility-Administered Medications   Medication Dose Route Frequency   • allopurinol (ZYLOPRIM) tablet 100 mg  100 mg Oral Daily   • aluminum-magnesium hydroxide-simethicone (MYLANTA) oral suspension 30 mL  30 mL Oral Q4H PRN   • ampicillin-sulbactam (UNASYN) 3 g in sodium chloride 0 9 % 100 mL IVPB  3 g Intravenous Q6H   • aspirin (ECOTRIN LOW STRENGTH) EC tablet 81 mg  81 mg Oral Daily   • docusate sodium (COLACE) capsule 100 mg  100 mg Oral BID   • HYDROcodone-acetaminophen (NORCO) 5-325 mg per tablet 1 tablet  1 tablet Oral Q6H PRN   • insulin lispro (HumaLOG) 100 units/mL subcutaneous injection 1-6 Units  1-6 Units Subcutaneous TID AC   • insulin lispro (HumaLOG) 100 units/mL subcutaneous injection 1-6 Units  1-6 Units Subcutaneous HS   • levothyroxine tablet 150 mcg  150 mcg Oral Early Morning   • metoprolol succinate (TOPROL-XL) 24 hr tablet 100 mg  100 mg Oral Daily   • pantoprazole (PROTONIX) EC tablet 40 mg  40 mg Oral Early Morning   • polyethylene glycol (MIRALAX) packet 17 g  17 g Oral Daily   • sodium chloride (OCEAN) 0 65 % nasal spray 1 spray  1 spray Each Nare Q4H PRN     Medications Discontinued During This Encounter   Medication Reason   • atenolol (TENORMIN) 50 mg tablet    • influenza vaccine, recombinant, quadrivalent (FLUBLOK) IM injection 0 5 mL    • LORazepam (ATIVAN) injection 0 5 mg    • ondansetron (ZOFRAN) injection 4 mg    • heparin (porcine) subcutaneous injection 5,000 Units    • vancomycin (VANCOCIN) 1,750 mg in sodium chloride 0 9 % 500 mL IVPB    • ceFAZolin (ANCEF) IVPB (premix in dextrose) 2,000 mg 50 mL    • lidocaine (PF) (XYLOCAINE-MPF) 1 % injection Patient Discharge   • bupivacaine (PF) (MARCAINE) 0 25 % injection Patient Discharge   • metroNIDAZOLE (FLAGYL) IVPB (premix) 500 mg 100 mL    • cefepime (MAXIPIME) IVPB (premix in dextrose) 2,000 mg 50 mL    • fentaNYL (SUBLIMAZE) injection 25 mcg    • metoclopramide (REGLAN) injection 10 mg        Marni Overton      This progress note was produced in part using a dictation device which may document imprecise wording from author's original intent

## 2022-11-03 NOTE — ASSESSMENT & PLAN NOTE
· Febrile (102), leukocytosis (14), tachycardia Secondary to cellulitis of foot-suspected osteomyelitis  · Continue IV antibiotic  · Podiatry consult appreciated   ·  R 2nd toe partial amputation 11/2 - see more under osteomyelitis   · Follow ID recommendation  · Final abx determined by surgical cure   · Dc vanc and cefazolin and switch to cefepime 2g q 12 hours   · Continue flagyl 500 tid   · Per ID d/c cefe/flagyl start Unasyn 3g Q6  · Clinically improving with resolved leukocytosis, afebrile   resolved

## 2022-11-03 NOTE — ASSESSMENT & PLAN NOTE
Lab Results   Component Value Date    HGBA1C 6 5 (H) 08/17/2022       Recent Labs     11/02/22  1405 11/02/22  1605 11/02/22  2059 11/03/22  0711   POCGLU 105 115 144* 112       Blood Sugar Average: Last 72 hrs:  (P) 129 [id number]     · Glipizide on hold with CKD 4  ·  sliding scale insulin coverage with Accu-Cheks  · Carbohydrate controlled diet  · Hypoglycemia protocol    Would benefit from an SGOT-2 inhibitor in the outpatient setting, will wait until he is back in his usual state health

## 2022-11-03 NOTE — PROGRESS NOTES
Progress Note - Infectious Disease   Gabino Voss 57 y o  male MRN: [id number]  Unit/Bed#: -01 Encounter: [id number]        REQUIRED DOCUMENTATION:     1  This service was provided via Telemedicine  2  Provider located at [facility]  3  TeleMed provider: Torsten Gillis MD   4  Identify all parties in room with patient during tele consult:RN  5  After connecting through [employer], patient was identified by name and date of birth and assistant checked wristband  Patient was then informed that this was a Telemedicine visit and that the exam was being conducted confidentially over secure lines  My office door was closed  No one else was in the room  Patient acknowledged consent and understanding of privacy and security of the Telemedicine visit, and gave us permission to have the assistant stay in the room in order to assist with the history and to conduct the exam   I informed the patient that I have reviewed their record in Epic and presented the opportunity for them to ask any questions regarding the visit today  The patient agreed to participate  Assessment/Recommendations     1  Sepsis, present on admission  - fever, tachycardia, leukocytosis  - Source of sepsis is right diabetic foot infection  - Clinically improving, afebrile without leukocytosis     · Management as below     2  Right foot cellulitis  - in the setting of chronic 2nd toe and midfoot ulcer  - MRI with distal toe osteo but no definitive osteo in the midfoot  - wound cx with Group G strep, Acinetobacter spp  - clinically improving     · Continue Amp-sulbactam 3 q6h, transition to po amox-clav 875 bid on discharge through 11/9  · Serial extremity exams, await operative course     3   Right distal phalangeal osteo  - suspected clinically with probe to bone and on MRI  - contiguous infection from overlying ulcer  - LEADs wnl  - s/p amputation at [facility] on 11/2 with presumed surgical cure     · Antibiotics as above  · FU with podiatry     4  Chronic R midfoot ulcer  - Intra op no probe to bone, no evidence of osteo on MRI    · Continue wound care, offloading, fu with podiatry    5  Type 2 diabetes, neuropathy  - A1C 6 5  - Risk factor for infection, poor wound healing     · Recommend strict glycemic control to aid with wound healing     6  CKD stage 4  - creatinine stable     · Continue renal dosing of abx       Discussed with the primary service  History       Subjective: The patient has no complaints  Denies fevers, chills, or sweats  Denies nausea, vomiting, or diarrhea  Antibiotics:  Amp-sulbactam      Physical Exam     Temp:  [97 8 °F (36 6 °C)-98 3 °F (36 8 °C)] 98 1 °F (36 7 °C)  HR:  [55-72] 60  Resp:  [16-19] 16  BP: (109-133)/(59-80) 128/80  SpO2:  [94 %-98 %] 94 %  Temp (24hrs), Av 1 °F (36 7 °C), Min:97 8 °F (36 6 °C), Max:98 3 °F (36 8 °C)  Current: Temperature: 98 1 °F (36 7 °C)    Intake/Output Summary (Last 24 hours) at 11/3/2022 1017  Last data filed at 11/3/2022 0900  Gross per 24 hour   Intake 1335 ml   Output 2450 ml   Net -1115 ml       Physical exam findings reported by bedside and primary medical team staff      General Appearance:  Appearing well, nontoxic, and in no distress, appears stated age   Throat: Oropharynx moist without lesions; lips, mucosa, and tongue normal; teeth and gums normal   Lungs:   Clear to auscultation bilaterally, no audible wheezes, rhonchi and rales, respirations unlabored   Heart:  Regular rate and rhythm, S1, S2 normal, no murmur, rub or gallop   Abdomen:   Soft, non-tender, non-distended, positive bowel sounds, no masses, no organomegaly    No CVA tenderness   Extremities: Extremities normal, atraumatic, no cyanosis, clubbing  R foot edema, iomproving   Skin: Skin color, texture, turgor normal, no rashes   R foot in dressing, no erythema   Neurologic: Alert and oriented times 3, extremity strength 5/5 and symmetric         Invasive Devices:   Peripheral IV 22 Right Hand (Active)   Site Assessment WDL 11/02/22 0800   Dressing Type Transparent 11/02/22 0800   Line Status Flushed;Saline locked 11/02/22 0800   Dressing Status Clean;Dry; Intact 11/02/22 0800   Dressing Change Due 11/05/22 11/01/22 2325       Labs, Imaging, & Other Studies     Lab Results:    I have personally reviewed pertinent labs  Results from last 7 days   Lab Units 11/03/22  0457 11/02/22  0515 11/01/22  1349   WBC Thousand/uL 5 98 6 99 6 92   HEMOGLOBIN g/dL 9 5* 9 5* 9 7*   PLATELETS Thousands/uL 234 222 205     Results from last 7 days   Lab Units 11/03/22  0457 10/30/22  0552 10/29/22  2032   POTASSIUM mmol/L 4 9   < > 4 6   CHLORIDE mmol/L 102   < > 101   CO2 mmol/L 26   < > 25   BUN mg/dL 30*   < > 28*   CREATININE mg/dL 2 19*   < > 2 42*   EGFR ml/min/1 73sq m 30   < > 27   CALCIUM mg/dL 8 5   < > 8 5   AST U/L  --   --  16   ALT U/L  --   --  30   ALK PHOS U/L  --   --  67    < > = values in this interval not displayed  Results from last 7 days   Lab Units 10/29/22  2032   BLOOD CULTURE  No Growth at 72 hrs  No Growth at 72 hrs  GRAM STAIN RESULT  4+ Gram positive cocci in pairs and chains*  4+ Gram negative coccobacilli*  No polys seen*   WOUND CULTURE  4+ Growth of Beta Hemolytic Streptococcus Group G*  1+ Growth of - Presumptive Acinetobacter courvalinii - Acinetobacter species*       Imaging Studies:   I have personally reviewed pertinent imaging study reports and images in PACS  EKG, Pathology, and Other Studies:   I have personally reviewed pertinent reports  Counseling/Coordination of care: Total 35 minutes communication with the patient via telehealth  Labs, medical tests and imaging studies were independently reviewed by me as noted above

## 2022-11-04 LAB
BACTERIA BLD CULT: NORMAL
BACTERIA BLD CULT: NORMAL

## 2022-11-08 ENCOUNTER — APPOINTMENT (OUTPATIENT)
Dept: LAB | Facility: HOSPITAL | Age: 62
End: 2022-11-08

## 2022-11-08 DIAGNOSIS — E11.52 TYPE 2 DIABETES MELLITUS WITH DIABETIC PERIPHERAL ANGIOPATHY AND GANGRENE, WITHOUT LONG-TERM CURRENT USE OF INSULIN (HCC): ICD-10-CM

## 2022-11-08 LAB
ANION GAP SERPL CALCULATED.3IONS-SCNC: 7 MMOL/L (ref 4–13)
BUN SERPL-MCNC: 24 MG/DL (ref 5–25)
CALCIUM SERPL-MCNC: 8.6 MG/DL (ref 8.3–10.1)
CHLORIDE SERPL-SCNC: 104 MMOL/L (ref 96–108)
CO2 SERPL-SCNC: 28 MMOL/L (ref 21–32)
CREAT SERPL-MCNC: 1.91 MG/DL (ref 0.6–1.3)
GFR SERPL CREATININE-BSD FRML MDRD: 36 ML/MIN/1.73SQ M
GLUCOSE P FAST SERPL-MCNC: 109 MG/DL (ref 65–99)
POTASSIUM SERPL-SCNC: 4.9 MMOL/L (ref 3.5–5.3)
SODIUM SERPL-SCNC: 139 MMOL/L (ref 135–147)

## 2022-11-10 ENCOUNTER — OFFICE VISIT (OUTPATIENT)
Dept: WOUND CARE | Facility: CLINIC | Age: 62
End: 2022-11-10

## 2022-11-10 VITALS
DIASTOLIC BLOOD PRESSURE: 86 MMHG | TEMPERATURE: 98.4 F | RESPIRATION RATE: 16 BRPM | HEART RATE: 76 BPM | SYSTOLIC BLOOD PRESSURE: 144 MMHG

## 2022-11-10 DIAGNOSIS — Z89.421 STATUS POST AMPUTATION OF LESSER TOE OF RIGHT FOOT (HCC): ICD-10-CM

## 2022-11-10 DIAGNOSIS — E08.621 DIABETIC ULCER OF RIGHT MIDFOOT ASSOCIATED WITH DIABETES MELLITUS DUE TO UNDERLYING CONDITION, WITH FAT LAYER EXPOSED (HCC): Primary | ICD-10-CM

## 2022-11-10 DIAGNOSIS — L97.412 DIABETIC ULCER OF RIGHT MIDFOOT ASSOCIATED WITH DIABETES MELLITUS DUE TO UNDERLYING CONDITION, WITH FAT LAYER EXPOSED (HCC): Primary | ICD-10-CM

## 2022-11-10 RX ORDER — AMPICILLIN TRIHYDRATE 250 MG
CAPSULE ORAL
COMMUNITY

## 2022-11-10 RX ORDER — ALBUTEROL SULFATE 90 UG/1
2 AEROSOL, METERED RESPIRATORY (INHALATION) EVERY 4 HOURS PRN
COMMUNITY

## 2022-11-10 RX ORDER — RANITIDINE 150 MG/1
TABLET ORAL
COMMUNITY
End: 2022-11-15 | Stop reason: ALTCHOICE

## 2022-11-10 RX ORDER — LOSARTAN POTASSIUM 50 MG/1
TABLET ORAL
COMMUNITY
End: 2022-11-15 | Stop reason: ALTCHOICE

## 2022-11-10 RX ORDER — FLUTICASONE PROPIONATE 110 UG/1
AEROSOL, METERED RESPIRATORY (INHALATION)
COMMUNITY

## 2022-11-10 NOTE — LETTER
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

## 2022-11-10 NOTE — PATIENT INSTRUCTIONS
Orders Placed This Encounter   Procedures    Debridement     This order was created via procedure documentation    Wound cleansing and dressings     Wash your hands with soap and water  Remove old dressing, discard into plastic bag and place in trash  Cleanse the wound with normal saline prior to applying a clean dressing  Do not use tissue or cotton balls  Do not scrub the wound  Pat dry using gauze  Shower yes (COVER RIGHT FOOT/LOWER LEG WITH CAST COVER)    Right dorsal foot:   Apply Purocol AG to the wound  Cover with dermagran, then gauze  Secure with clara  Change dressing every 3 days and as needed if soiled or lose integrity  Right 2nd toe amputation site:   Apply Xeroform to wound  Cover with gauze  Secure with clara  Change dressing every 3 days and as needed if soiled or lose integrity  ACE wrap    Apply compression wrap to your affected Leg(s) base of toes to ankle making sure to cover the heel  Apply in the morning and re-wrap as needed during the day if wrap becomes loose  Remove at bedtime and elevate legs or lie down  Avoid prolonged standing in one place  Elevate leg(s) above the level of the heart when sitting or as much as possible  Limit walking on right foot  Okay to go to work on Monday, 11/21/22 if in the office, sitting       Standing Status:   Future     Standing Expiration Date:   11/10/2023

## 2022-11-10 NOTE — PROGRESS NOTES
Patient ID: Carmen Wiggins is a 57 y o  male Date of Birth 1960       Chief Complaint   Patient presents with   • New Patient Visit     RIGHT 2ND TOE PARTIAL AMPUTATION and right foot wound debridement (Right Toe): surgery on 11/2/22       Allergies  Aspirin, Other, Felodipine, Lisinopril, Niacin, Nsaids, and Simvastatin    Diagnosis:  1  Diabetic ulcer of right midfoot associated with diabetes mellitus due to underlying condition, with fat layer exposed ([facility] [address] )  -     Wound cleansing and dressings; Future    2  Status post amputation of lesser toe of right foot ([facility])  -     Wound cleansing and dressings; Future        Diagnosis ICD-10-CM Associated Orders   1  Diabetic ulcer of right midfoot associated with diabetes mellitus due to underlying condition, with fat layer exposed ([facility] [address] )  I24 [id number] Wound cleansing and dressings    L97 [id number]    2  Status post amputation of lesser toe of right foot ([facility])  Z89 [id number] Wound cleansing and dressings          Assessment & Plan:  See wound care orders  - Right 2nd toe partial amputation performed 11/2/22 due to osteomyelitis of distal phalanx (MRI)  Area well fully healed, sutures intact, minimal edema  No acute SOI  Sutures removed  - Right plantar foot ulceration appears w/o acute SOI  - 10/13/22 LEADs: R 1 15/128/122 WNL  L 1 13/>255/120 WNL  - WBAT forefoot unloading shoe but only for ADLs  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 [address] )   • Acquired hypothyroidism   • Stage 4 chronic kidney disease (HCC)   • Essential hypertension   • Chronic anemia   • Type 2 diabetes mellitus with circulatory disorder, without long-term current use of insulin (HCC)   • GERD (gastroesophageal reflux disease)   • Class 2 obesity in adult     Past Medical History:   Diagnosis Date   • Chronic kidney failure, stage 2 (mild)    • Diabetes mellitus (HCC)    • GERD (gastroesophageal reflux disease)    • Gout    • Hypertension      Past Surgical History:   Procedure Laterality Date   • ANKLE FRACTURE SURGERY Right    • CHOLECYSTECTOMY     • TOE AMPUTATION Right 11/2/2022    Procedure: RIGHT 2ND TOE PARTIAL AMPUTATION and right foot wound debridement;  Surgeon: Yifan Gomez DPM;  Location:  MAIN OR;  Service: Podiatry     Social History     Socioeconomic History   • Marital status: Single     Spouse name: Not on file   • Number of children: Not on file   • Years of education: Not on file   • Highest education level: Not on file   Occupational History   • Not on file   Tobacco Use   • Smoking status: Former Smoker     Packs/day: 1 00     Types: Cigarettes   • Smokeless tobacco: Never Used   Vaping Use   • Vaping Use: Former   Substance and Sexual Activity   • Alcohol use: Not Currently   • Drug use: Never   • Sexual activity: Not on file   Other Topics Concern   • Not on file   Social History Narrative   • Not on file     Social Determinants of Health     Financial Resource Strain: Not on file   Food Insecurity: No Food Insecurity   • Worried About Running Out of Food in the Last Year: Never true   • Ran Out of Food in the Last Year: Never true   Transportation Needs: No Transportation Needs   • Lack of Transportation (Medical): No   • Lack of Transportation (Non-Medical):  No   Physical Activity: Not on file   Stress: Not on file   Social Connections: Not on file   Intimate Partner Violence: Not on file   Housing Stability: Low Risk    • Unable to Pay for Housing in the Last Year: No   • Number of Places Lived in the Last Year: 1   • Unstable Housing in the Last Year: No        Current Outpatient Medications:   •  albuterol (PROVENTIL HFA,VENTOLIN HFA) 90 mcg/act inhaler, Inhale 2 puffs every 4 (four) hours as needed, Disp: , Rfl:   •  allopurinol (ZYLOPRIM) 100 mg tablet, Take by mouth, Disp: , Rfl:   •  amoxicillin-clavulanate (AUGMENTIN) 875-125 mg per tablet, Take 1 tablet by mouth every 12 (twelve) hours for 7 days, Disp: 14 tablet, Rfl: 0  •  aspirin (ECOTRIN LOW STRENGTH) 81 mg EC tablet, Take by mouth, Disp: , Rfl:   •  atorvastatin (LIPITOR) 20 mg tablet, , Disp: , Rfl:   •  Cinnamon 500 MG capsule, Take by mouth, Disp: , Rfl:   •  co-enzyme Q-10 100 mg capsule, Take by mouth, Disp: , Rfl:   •  fluticasone (Flovent HFA) 110 MCG/ACT inhaler, Inhale, Disp: , Rfl:   •  glipiZIDE (GLUCOTROL XL) 5 mg 24 hr tablet, TAKE 1 TABLET DAILY 30 MINUTES BEFORE A MEAL (REPLACES 2 5 MG), Disp: , Rfl:   •  levothyroxine 150 mcg tablet, TAKE 1 TABLET DAILY AT LEAST 30 MINUTES PRIOR TO BREAKFAST OR OTHER MEDICATIONS (REPLACES 137 MCG), Disp: , Rfl:   •  losartan (COZAAR) 50 mg tablet, , Disp: , Rfl:   •  metoprolol succinate (TOPROL-XL) 100 mg 24 hr tablet, Take 100 mg by mouth daily, Disp: , Rfl:   •  omeprazole (PriLOSEC) 20 mg delayed release capsule, , Disp: , Rfl:   •  ranitidine (ZANTAC) 150 mg tablet, , Disp: , Rfl:   Family History   Problem Relation Age of Onset   • Gout Mother    • Diabetes Father    • Heart disease Father       Review of Systems   Constitutional: Negative for activity change, chills and fever  HENT: Negative  Respiratory: Negative for cough, chest tightness and shortness of breath  Cardiovascular: Negative for chest pain and leg swelling  Endocrine: Negative  Genitourinary: Negative  Musculoskeletal:        S/p right 2nd toe partial amputation   Skin: Positive for wound (Right foot wound)  Neurological: Negative  Negative for numbness  Psychiatric/Behavioral: Negative  Negative for agitation and behavioral problems  Objective:  /86   Pulse 76   Temp 98 4 °F (36 9 °C)   Resp 16     Physical Exam  Cardiovascular:      Comments: Right DP pulses palpable, PT pulses diminished  Toes warm and well perfused  Musculoskeletal:         General: No tenderness  Comments: S/p right 2nd toe partial amputation   Skin:     Findings: Lesion (Right sub met 3 head ulceration (see below for detailes)  No cellulitis, edema, purulence, crepitus, malodor ) present  No erythema  Comments: Healed incision to right 2nd toe partial amputation site  No acute SOI  Neurological:      Mental Status: He is oriented to person, place, and time  Comments: +peripheral neuropathy  Diminished protective sensation to feet  Wound 11/10/22 Surgical Toe (Comment  which one) Anterior;Right (Active)   Wound Image   11/10/22 1301   Wound Description Other (Comment); Clean;Dry 11/10/22 1301   Lara-wound Assessment Clean;Dry; Intact 11/10/22 1301   Wound Length (cm) 0 1 cm 11/10/22 1301   Wound Width (cm) 2 cm 11/10/22 1301   Wound Depth (cm) 0 1 cm 11/10/22 1301   Wound Surface Area (cm^2) 0 2 cm^2 11/10/22 1301   Wound Volume (cm^3) 0 02 cm^3 11/10/22 1301   Calculated Wound Volume (cm^3) 0 02 cm^3 11/10/22 1301   Drainage Amount Small 11/10/22 1301   Drainage Description Serosanguineous 11/10/22 1301       Wound 11/10/22 Diabetic Ulcer Foot Right;Posterior (Active)   Wound Image   11/10/22 1302   Wound Description Slough 11/10/22 1302   Lara-wound Assessment Clean;Dry; Intact 11/10/22 1302   Wound Length (cm) 0 9 cm 11/10/22 1302   Wound Width (cm) 0 7 cm 11/10/22 1302   Wound Depth (cm) 0 3 cm 11/10/22 1302   Wound Surface Area (cm^2) 0 63 cm^2 11/10/22 1302   Wound Volume (cm^3) 0 189 cm^3 11/10/22 1302   Calculated Wound Volume (cm^3) 0 19 cm^3 11/10/22 1302   Drainage Amount Small 11/10/22 1302   Drainage Description Serosanguineous 11/10/22 1302             Debridement   Universal Protocol:  Consent: Verbal consent obtained  Risks and benefits: risks, benefits and alternatives were discussed  Consent given by: patient  Patient understanding: patient states understanding of the procedure being performed  Patient consent: the patient's understanding of the procedure matches consent given  Patient identity confirmed: verbally with patient      Performed by: physician  Debridement type: surgical  Level of debridement: subcutaneous tissue    Pre-debridement measurements  Length (cm): 0 9  Width (cm): 0 7  Depth (cm): 0 3  Surface Area (cm^2): 0 63  Volume (cm^3): 0 19    Post-debridement measurements  Length (cm): 0 9  Width (cm): 0 7  Depth (cm): 0 4  Percent debrided: 100%  Surface Area (cm^2): 0 63  Area debrided (cm^2): 0 63  Volume (cm^3): 0 25  Tissue and other material debrided: adipose and subcutaneous tissue  Devitalized tissue debrided: biofilm, fibrin and slough  Instrument(s) utilized: blade  Bleeding: small  Hemostasis obtained with: pressure  Procedural pain (0-10): insensate  Post-procedural pain: insensate   Response to treatment: procedure was tolerated well           Results from last 6 Months   Lab Units 10/29/22  2032   WOUND CULTURE  4+ Growth of Beta Hemolytic Streptococcus Group G*  1+ Growth of - Presumptive Acinetobacter courvalinii - Acinetobacter species*       Wound Instructions:  Orders Placed This Encounter   Procedures   • Wound cleansing and dressings     Wash your hands with soap and water  Remove old dressing, discard into plastic bag and place in trash  Cleanse the wound with normal saline prior to applying a clean dressing  Do not use tissue or cotton balls  Do not scrub the wound  Pat dry using gauze  Shower yes (COVER RIGHT FOOT/LOWER LEG WITH CAST COVER)    Right dorsal foot:   Apply Purocol AG to the wound  Cover with dermagran, then gauze  Secure with clara     Change dressing every 3 days and as needed if soiled or lose integrity  Right 2nd toe amputation site:   Apply Xeroform to wound  Cover with gauze  Secure with clara  Change dressing every 3 days and as needed if soiled or lose integrity  Limit walking on right foot  Okay to go to work on Monday, 11/21/22 if in the office, sitting  Standing Status:   Future     Standing Expiration Date:   11/10/2023   • Debridement     This order was created via procedure documentation         Uzma Garcia DPM    Portions of the record may have been created with voice recognition software  Occasional wrong word or "sound a like" substitutions may have occurred due to the inherent limitations of voice recognition software  Read the chart carefully and recognize, using context, where substitutions have occurred

## 2022-11-15 ENCOUNTER — OFFICE VISIT (OUTPATIENT)
Dept: NEPHROLOGY | Facility: CLINIC | Age: 62
End: 2022-11-15

## 2022-11-15 VITALS
WEIGHT: 238 LBS | TEMPERATURE: 97.9 F | SYSTOLIC BLOOD PRESSURE: 132 MMHG | OXYGEN SATURATION: 95 % | BODY MASS INDEX: 34.07 KG/M2 | HEART RATE: 84 BPM | HEIGHT: 70 IN | DIASTOLIC BLOOD PRESSURE: 84 MMHG

## 2022-11-15 DIAGNOSIS — R80.9 PROTEINURIA, UNSPECIFIED TYPE: ICD-10-CM

## 2022-11-15 DIAGNOSIS — N18.4 STAGE 4 CHRONIC KIDNEY DISEASE (HCC): Primary | ICD-10-CM

## 2022-11-15 DIAGNOSIS — E11.22 CONTROLLED TYPE 2 DIABETES MELLITUS WITH STAGE 4 CHRONIC KIDNEY DISEASE, WITHOUT LONG-TERM CURRENT USE OF INSULIN (HCC): ICD-10-CM

## 2022-11-15 DIAGNOSIS — Z87.39 HISTORY OF GOUT: ICD-10-CM

## 2022-11-15 DIAGNOSIS — N18.4 CONTROLLED TYPE 2 DIABETES MELLITUS WITH STAGE 4 CHRONIC KIDNEY DISEASE, WITHOUT LONG-TERM CURRENT USE OF INSULIN (HCC): ICD-10-CM

## 2022-11-15 DIAGNOSIS — I12.9 HYPERTENSIVE CHRONIC KIDNEY DISEASE WITH STAGE 1 THROUGH STAGE 4 CHRONIC KIDNEY DISEASE, OR UNSPECIFIED CHRONIC KIDNEY DISEASE: ICD-10-CM

## 2022-11-15 DIAGNOSIS — D50.9 IRON DEFICIENCY ANEMIA, UNSPECIFIED IRON DEFICIENCY ANEMIA TYPE: ICD-10-CM

## 2022-11-15 RX ORDER — FERROUS SULFATE TAB EC 324 MG (65 MG FE EQUIVALENT) 324 (65 FE) MG
324 TABLET DELAYED RESPONSE ORAL EVERY OTHER DAY
Qty: 15 TABLET | Refills: 2 | Status: SHIPPED | OUTPATIENT
Start: 2022-11-15

## 2022-11-15 RX ORDER — LOSARTAN POTASSIUM 25 MG/1
25 TABLET ORAL DAILY
Qty: 30 TABLET | Refills: 2 | Status: SHIPPED | OUTPATIENT
Start: 2022-11-15

## 2022-11-15 NOTE — PATIENT INSTRUCTIONS
Your kidney function is stable at 36%  Will restart losartan at lower dose 25mg/day  Check blood work in 1 week to evaluate potassium  Lower potassium diet on losartan is advised given your potassium is upper limit of normal  Try to reduce intake of tomatoes, potatoes, orange juice and citrus fruits  Please call if any chest pain, cramping, muscle weakness this may be signs that your potassium is higher  Follow up in 2 months  Check blood/urine tests 2 weeks prior to next visit  Start oral iron every other day to help with iron deficiency  Can take colace over the counter twice a day to help with constipation

## 2022-11-15 NOTE — PROGRESS NOTES
Assessment & Plan:    1  Stage 4 chronic kidney disease (HCC)  -     losartan (COZAAR) 25 mg tablet; Take 1 tablet (25 mg total) by mouth daily  -     Basic metabolic panel; Future; Expected date: 11/22/2022  -     Protein electrophoresis, urine; Future; Expected date: 12/27/2022  -     Protein electrophoresis, serum; Future; Expected date: 12/27/2022  -     Comprehensive metabolic panel; Future; Expected date: 12/27/2022  -     PTH, intact; Future; Expected date: 12/27/2022  -     Uric acid; Future; Expected date: 12/27/2022  -     Urinalysis with microscopic; Future; Expected date: 12/27/2022  -     Microalbumin / creatinine urine ratio; Future; Expected date: 12/27/2022  -     Phosphorus; Future; Expected date: 12/27/2022  -     CBC and differential; Future; Expected date: 12/27/2022  -     Cawood/Lambda Light Chains Free With Ratio, Serum; Future; Expected date: 12/15/2022    2  Hypertensive chronic kidney disease with stage 1 through stage 4 chronic kidney disease, or unspecified chronic kidney disease  -     losartan (COZAAR) 25 mg tablet; Take 1 tablet (25 mg total) by mouth daily  -     Basic metabolic panel; Future; Expected date: 11/22/2022  -     Comprehensive metabolic panel; Future; Expected date: 12/27/2022  -     PTH, intact; Future; Expected date: 12/27/2022  -     Microalbumin / creatinine urine ratio; Future; Expected date: 12/27/2022    3  Proteinuria, unspecified type  -     Protein electrophoresis, urine; Future; Expected date: 12/27/2022  -     Protein electrophoresis, serum; Future; Expected date: 12/27/2022  -     Comprehensive metabolic panel; Future; Expected date: 12/27/2022  -     Microalbumin / creatinine urine ratio; Future; Expected date: 12/27/2022  -     Cawood/Lambda Light Chains Free With Ratio, Serum; Future; Expected date: 12/15/2022    4   Iron deficiency anemia, unspecified iron deficiency anemia type  -     CBC and differential; Future; Expected date: 12/27/2022  -     ferrous sulfate 324 (65 Fe) mg; Take 1 tablet (324 mg total) by mouth every other day    5  History of gout    6  Controlled type 2 diabetes mellitus with stage 4 chronic kidney disease, without long-term current use of insulin (HCC)       1  CKD3B/4 baseline Cr 2 2-2 4  Cr 1 9 on 11/8 with eGFR 36ml/min  Etiology suspected 2/2 DM nephropathy, HTN  Potentially atherosclerosis and age related nephrosclerosis as well  11/8/22  Na 139 WNL  K 4 9 WNL  Cl 104 TCO2 28 WNL  Ca 8 6 WNL    Renal US   Right kidney 11 1 cm   Left kidney 10 6 cm   normal echogenicity, contour, no masses,calculi    Recommend    1  Reviewed CKD stages, Cr and eGFR trends  2  Check spep/upep/kappa lambda ratio to eval for monoclonal gammopathy  Discussed that etiology kidney disease is attributed to HTN/DM but cannot completely exclude other pathologies  No emergency for renal biopsy at this time, will await results of workup  No hematuria  A2 level proteinuria on spot sample, was not 24 hour sample  3  Follow up in 2 months  4  Goal BP <130/80  5  Goal proteinuria < 500mg/day  6  Resume losartan at reduced dose 25mg/day  He has borderline potassium 4 9 and would be at risk for hyperkalemia  Reviewed high potassium foods and would try to reduce intake  Symptoms of hyperkalemia reviewed  Check BMP in 1 week  2  HTN renal disease  Resume losartan 25mg/day at lower dose as potassium ULN, 4 9  Goal <130/80  Check bmp in 1 week after starting losartan  3  Proteinuria--a2 level  microalbumin to cr ratio 115 mg/g cr  Check paraproteinemia eval    Hep C nonreactive    4  MARTHA hgb 9 5 on 11/3  t sat 12  Attempt oral iron every other day  If fails trial then will need hematology for IV iron  Avoid IV iron in the setting of infection  Check CBC in 1-2 months  5  DM type 2   Maintained on glipizide  With current infection concerns would avoid SGLT-2i   Can consider in the future     Resume losartan for DM nephropathy at reduced dose as potassium 4 9      6  Hx gout, last flare > 5 years ago  Continue allopurinol  Check uric acid, goal <6  The benefits, risks and alternatives to the treatment plan were discussed at this visit  Patient was advised of common adverse effects of any medical therapies prescribed  All questions were answered and discussed with the patient and any accompanying family members or caretakers  Subjective:      Patient ID: Keegan Obando is a 57 y o  male who presents for follow up in the [facility] office  he was seen from 10/30-11/3 by nephrology service for [address]  He was hospitalized with osteomyelitis of right foot requiring right toe partial amputation and plantar foot wound debridement  Transitioned to oral augmentin  Baseline Cr 2 2-2 4  Etiology attributed to underlying DM likely  He required an outpatient evaluation for paraproteinemia  Most recent Cr 1 9 on 11/8/22 with eGFR 36ml/min  He is accompanied by wife in office  HPI    In interim since discharge from hospital, denies any new medical conditions, surgeries, medications or allergies  Followed up with podiatry on 11/10 for the diabetic ulcer  Bp  132/84  Hr 84  For BP takes metoprolol succinate 100mg/day  Losartan held preoperatively and not resumed as of yet  BG under reasonable control  He has been in 140 range  Hx gout, last episode >5 years ago, maintained on allopurinol  Denies seeing urologist   Rakel jang in the hospital        The following portions of the patient's history were reviewed and updated as appropriate: allergies, current medications, past family history, past medical history, past social history, past surgical history, and problem list     Review of Systems   Respiratory: Negative  Cardiovascular: Negative  Gastrointestinal: Negative  Genitourinary: Negative  All other systems reviewed and are negative          Objective:      /84 (BP Location: Left arm, Patient Position: Sitting)   Pulse 84   Temp 97 9 °F (36 6 °C)   Ht 5' 10" (1 778 m)   Wt 108 kg (238 lb)   SpO2 95%   BMI 34 15 kg/m²          Physical Exam  Vitals and nursing note reviewed  Constitutional:       General: He is not in acute distress  Appearance: He is obese  He is not ill-appearing  HENT:      Head: Atraumatic  Nose: Nose normal       Mouth/Throat:      Mouth: Mucous membranes are moist       Pharynx: Oropharynx is clear  Eyes:      Extraocular Movements: Extraocular movements intact  Conjunctiva/sclera: Conjunctivae normal    Cardiovascular:      Rate and Rhythm: Normal rate and regular rhythm  Heart sounds: Normal heart sounds  No friction rub  Pulmonary:      Breath sounds: No wheezing, rhonchi or rales  Abdominal:      General: Abdomen is flat  Bowel sounds are normal  There is no distension  Palpations: Abdomen is soft  Tenderness: There is no abdominal tenderness  There is no guarding  Musculoskeletal:      Right lower leg: No edema  Left lower leg: No edema  Skin:     General: Skin is warm and dry  Neurological:      General: No focal deficit present  Mental Status: He is alert and oriented to person, place, and time  Mental status is at baseline  Cranial Nerves: No cranial nerve deficit        Gait: Gait abnormal       Comments: Ambulate with cane    Psychiatric:         Mood and Affect: Mood normal          Behavior: Behavior normal              Lab Results   Component Value Date    SODIUM 139 11/08/2022    K 4 9 11/08/2022     11/08/2022    CO2 28 11/08/2022    AGAP 7 11/08/2022    BUN 24 11/08/2022    CREATININE 1 91 (H) 11/08/2022    GLUC 110 11/03/2022    GLUF 109 (H) 11/08/2022    CALCIUM 8 6 11/08/2022    AST 16 10/29/2022    ALT 30 10/29/2022    ALKPHOS 67 10/29/2022    TP 8 0 10/29/2022    TBILI 0 99 10/29/2022    EGFR 36 11/08/2022      Lab Results   Component Value Date    CREATININE 1 91 (H) 11/08/2022    CREATININE 2 19 (H) 11/03/2022 CREATININE 2 26 (H) 11/02/2022    CREATININE 2 18 (H) 11/01/2022    CREATININE 2 28 (H) 10/31/2022    CREATININE 2 22 (H) 10/30/2022    CREATININE 2 42 (H) 10/29/2022    CREATININE 2 41 (H) 08/24/2022      Lab Results   Component Value Date    COLORU Yellow 10/30/2022    CLARITYU Clear 10/30/2022    SPECGRAV 1 015 10/30/2022    PHUR 6 0 10/30/2022    LEUKOCYTESUR Negative 10/30/2022    NITRITE Negative 10/30/2022    PROTEIN UA Negative 10/30/2022    GLUCOSEU Negative 10/30/2022    KETONESU Negative 10/30/2022    UROBILINOGEN 0 2 10/30/2022    BILIRUBINUR Negative 10/30/2022    BLOODU Negative 10/30/2022    RBCUA None Seen 10/30/2022    WBCUA None Seen 10/30/2022    EPIS None Seen 10/30/2022    BACTERIA None Seen 10/30/2022      No results found for: LABPROT  No results found for: Brendia Hides Results   Component Value Date    WBC 5 98 11/03/2022    HGB 9 5 (L) 11/03/2022    HCT 29 2 (L) 11/03/2022     (H) 11/03/2022     11/03/2022      Lab Results   Component Value Date    HGB 9 5 (L) 11/03/2022    HGB 9 5 (L) 11/02/2022    HGB 9 7 (L) 11/01/2022    HGB 9 3 (L) 10/31/2022    HGB 9 7 (L) 10/30/2022      Lab Results   Component Value Date    IRON 20 (L) 10/31/2022    TIBC 164 (L) 10/31/2022    FERRITIN 299 10/31/2022      No results found for: PTHCALCIUM, [id number], PHOSPHORUS   No results found for: CHOLESTEROL, HDL, LDLCALC, TRIG   No results found for: URICACID   Lab Results   Component Value Date    HGBA1C 6 5 (H) 08/17/2022      No results found for: TSHANTIBODY, J9XDHDQ, FREET4   No results found for: YAIMA, DSDNAAB, RFIGM   No results found for: PROT, UPEP, IMMUNOFIX, KAPPALAMBDA, KAPPALIGHT     Portions of the record may have been created with voice recognition software  Occasional wrong word or "sound a like" substitutions may have occurred due to the inherent limitations of voice recognition software   Read the chart carefully and recognize, using context, where substitutions have occurred  If you have any questions, please contact the dictating provider

## 2022-11-17 ENCOUNTER — OFFICE VISIT (OUTPATIENT)
Dept: WOUND CARE | Facility: CLINIC | Age: 62
End: 2022-11-17

## 2022-11-17 VITALS
RESPIRATION RATE: 18 BRPM | HEART RATE: 60 BPM | DIASTOLIC BLOOD PRESSURE: 64 MMHG | TEMPERATURE: 97.4 F | SYSTOLIC BLOOD PRESSURE: 124 MMHG

## 2022-11-17 DIAGNOSIS — L97.412 DIABETIC ULCER OF RIGHT MIDFOOT ASSOCIATED WITH DIABETES MELLITUS DUE TO UNDERLYING CONDITION, WITH FAT LAYER EXPOSED (HCC): Primary | ICD-10-CM

## 2022-11-17 DIAGNOSIS — Z89.421 STATUS POST AMPUTATION OF LESSER TOE OF RIGHT FOOT (HCC): ICD-10-CM

## 2022-11-17 DIAGNOSIS — E08.621 DIABETIC ULCER OF RIGHT MIDFOOT ASSOCIATED WITH DIABETES MELLITUS DUE TO UNDERLYING CONDITION, WITH FAT LAYER EXPOSED (HCC): Primary | ICD-10-CM

## 2022-11-17 NOTE — PATIENT INSTRUCTIONS
Orders Placed This Encounter   Procedures    Wound cleansing and dressings     Wound cleansing and dressings      Wash your hands with soap and water  Remove old dressing, discard into plastic bag and place in trash  Cleanse the wound with normal saline prior to applying a clean dressing  Do not use tissue or cotton balls  Do not scrub the wound  Pat dry using gauze  Shower yes (COVER RIGHT FOOT/LOWER LEG WITH CAST COVER)     Right dorsal foot:   Apply Purocol AG to the wound  Cover with dermagran, then gauze  Secure with clara  Change dressing every 3 days and as needed if soiled or lose integrity  Right 2nd toe amputation site:   May leave open to air at this time  IT is healed     Limit walking on right foot  Really try to stay off the foot as much as possible  If you keep all pressure off the front of that foot it will help it heal faster    Okay to go to work on Monday, 11/21/22 if in the office, sitting      Follow up in 2 weeks     Standing Status:   Future     Standing Expiration Date:   11/17/2023

## 2022-11-17 NOTE — PROGRESS NOTES
Patient ID: Carolina Yen is a 57 y o  male Date of Birth 1960       Chief Complaint   Patient presents with   • Follow Up Wound Care Visit     Right foot wound       Allergies:  Aspirin, Other, Felodipine, Lisinopril, Niacin, Nsaids, and Simvastatin    Diagnosis:  1  Diabetic ulcer of right midfoot associated with diabetes mellitus due to underlying condition, with fat layer exposed ([facility] )    2  Status post amputation of lesser toe of right foot ([facility] )       Diagnosis ICD-10-CM Associated Orders   1  Diabetic ulcer of right midfoot associated with diabetes mellitus due to underlying condition, with fat layer exposed ([facility] )  D75 [id number]     [id number]       2  Status post amputation of lesser toe of right foot ([place] [address] )  [id number]            Assessment & Plan:  See wound care orders  - Right 2nd toe partial amputation performed 11/2/22 due to osteomyelitis of distal phalanx (MRI)  Area still fully healed  - Right plantar foot ulceration appears w/o acute SOI; decreased L/W and depth same    - 10/13/22 LEADs: R 1 15/128/122 WNL  L 1 13/>255/120 WNL  - WBAT forefoot unloading shoe but only for ADLs  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  - F/u 2 weeks; call sooner if redness, pain, swelling, white drainage appear  Consider CAM boot tall if minimal improvement in size (TCC unable to be applied due to need to drive to work)    Subjective:   Presents for F/U wound  Feels well  Limiting walking but still placing pressure on foot        The following portions of the patient's history were reviewed and updated as appropriate:   Patient Active Problem List   Diagnosis   • Osteomyelitis of foot, right, acute ([facility] )   • Sepsis ([facility] )   • Acquired hypothyroidism   • Stage 4 chronic kidney disease ([facility] )   • Essential hypertension   • Chronic anemia   • Type 2 diabetes mellitus with circulatory disorder, without long-term current use of insulin ([facility])   • GERD (gastroesophageal reflux disease)   • Class 2 obesity in adult     Past Medical History:   Diagnosis Date   • Chronic kidney failure, stage 2 (mild)    • Diabetes mellitus ([facility] )    • GERD (gastroesophageal reflux disease)    • Gout    • Hypertension      Past Surgical History:   Procedure Laterality Date   • ANKLE FRACTURE SURGERY Right    • CHOLECYSTECTOMY     • TOE AMPUTATION Right 11/2/2022    Procedure: RIGHT 2ND TOE PARTIAL AMPUTATION and right foot wound debridement;  Surgeon: Nataly Nice DPM;  Location:  MAIN OR;  Service: Podiatry     Social History     Socioeconomic History   • Marital status: Single     Spouse name: None   • Number of children: None   • Years of education: None   • Highest education level: None   Occupational History   • None   Tobacco Use   • Smoking status: Former     Packs/day: 1 00     Types: Cigarettes   • Smokeless tobacco: Never   Vaping Use   • Vaping Use: Former   Substance and Sexual Activity   • Alcohol use: Not Currently   • Drug use: Never   • Sexual activity: None   Other Topics Concern   • None   Social History Narrative   • None     Social Determinants of Health     Financial Resource Strain: Not on file   Food Insecurity: No Food Insecurity   • Worried About Running Out of Food in the Last Year: Never true   • Ran Out of Food in the Last Year: Never true   Transportation Needs: No Transportation Needs   • Lack of Transportation (Medical): No   • Lack of Transportation (Non-Medical):  No   Physical Activity: Not on file   Stress: Not on file   Social Connections: Not on file   Intimate Partner Violence: Not on file   Housing Stability: Low Risk    • Unable to Pay for Housing in the Last Year: No   • Number of Places Lived in the Last Year: 1   • Unstable Housing in the Last Year: No        Current Outpatient Medications:   •  albuterol (PROVENTIL HFA,VENTOLIN HFA) 90 mcg/act inhaler, Inhale 2 puffs every 4 (four) hours as needed, Disp: , Rfl:   •  allopurinol (ZYLOPRIM) 100 mg tablet, Take by mouth, Disp: , Rfl:   •  aspirin (ECOTRIN LOW STRENGTH) 81 mg EC tablet, Take by mouth, Disp: , Rfl:   •  atorvastatin (LIPITOR) 20 mg tablet, , Disp: , Rfl:   •  Cinnamon 500 MG capsule, Take by mouth, Disp: , Rfl:   •  co-enzyme Q-10 100 mg capsule, Take by mouth, Disp: , Rfl:   •  ferrous sulfate 324 (65 Fe) mg, Take 1 tablet (324 mg total) by mouth every other day, Disp: 15 tablet, Rfl: 2  •  fluticasone (FLOVENT HFA) 110 MCG/ACT inhaler, Inhale, Disp: , Rfl:   •  glipiZIDE (GLUCOTROL XL) 5 mg 24 hr tablet, TAKE 1 TABLET DAILY 30 MINUTES BEFORE A MEAL (REPLACES 2 5 MG), Disp: , Rfl:   •  levothyroxine 150 mcg tablet, TAKE 1 TABLET DAILY AT LEAST 30 MINUTES PRIOR TO BREAKFAST OR OTHER MEDICATIONS (REPLACES 137 MCG), Disp: , Rfl:   •  losartan (COZAAR) 25 mg tablet, Take 1 tablet (25 mg total) by mouth daily, Disp: 30 tablet, Rfl: 2  •  metoprolol succinate (TOPROL-XL) 100 mg 24 hr tablet, Take 100 mg by mouth daily, Disp: , Rfl:   •  omeprazole (PriLOSEC) 20 mg delayed release capsule, , Disp: , Rfl:   Family History   Problem Relation Age of Onset   • Gout Mother    • Diabetes Father    • Heart disease Father       Review of Systems   Constitutional: Negative for activity change, chills and fever  HENT: Negative  Respiratory: Negative for cough, chest tightness and shortness of breath  Cardiovascular: Negative for chest pain and leg swelling  Endocrine: Negative  Genitourinary: Negative  Musculoskeletal:        S/p right 2nd toe partial amputation   Skin: Positive for wound (Right foot wound)  Neurological: Negative  Negative for numbness  Psychiatric/Behavioral: Negative  Negative for agitation and behavioral problems  Objective:  /64   Pulse 60   Temp (!) 97 4 °F (36 3 °C)   Resp 18     Physical Exam  Cardiovascular:      Comments: Right DP pulses palpable, PT pulses diminished  Toes warm and well perfused  Musculoskeletal:         General: No tenderness        Comments: S/p right 2nd toe partial amputation Skin:     Findings: Lesion (Right sub met 3 head ulceration (see below for detailes)  No cellulitis, edema, purulence, crepitus, malodor ) present  No erythema  Comments: Healed incision to right 2nd toe partial amputation site  No acute SOI  Neurological:      Mental Status: He is oriented to person, place, and time  Comments: +peripheral neuropathy  Diminished protective sensation to feet  Wound 11/10/22 Surgical Toe (Comment  which one) Anterior;Right (Active)   Wound Image   11/17/22 1318   Wound Description Epithelialization 11/17/22 1321   Lara-wound Assessment Clean;Dry; Intact 11/17/22 1321   Wound Length (cm) 0 cm 11/17/22 1321   Wound Width (cm) 0 cm 11/17/22 1321   Wound Depth (cm) 0 cm 11/17/22 1321   Wound Surface Area (cm^2) 0 cm^2 11/17/22 1321   Wound Volume (cm^3) 0 cm^3 11/17/22 1321   Calculated Wound Volume (cm^3) 0 cm^3 11/17/22 1321   Change in Wound Size % 100 11/17/22 1321   Drainage Amount None 11/17/22 1321   Drainage Description Serosanguineous 11/10/22 1301   Non-staged Wound Description Not applicable 69/62/74 8741   Treatments Cleansed 11/17/22 1321       Wound 11/10/22 Diabetic Ulcer Foot Right;Posterior (Active)   Wound Image   11/17/22 1319   Wound Description Slough;Granulation tissue 11/17/22 1320   Lara-wound Assessment Callus;Dry 11/17/22 1320   Wound Length (cm) 0 6 cm 11/17/22 1320   Wound Width (cm) 0 6 cm 11/17/22 1320   Wound Depth (cm) 0 3 cm 11/17/22 1320   Wound Surface Area (cm^2) 0 36 cm^2 11/17/22 1320   Wound Volume (cm^3) 0 108 cm^3 11/17/22 1320   Calculated Wound Volume (cm^3) 0 11 cm^3 11/17/22 1320   Change in Wound Size % 42 11 11/17/22 1320   Drainage Amount Small 11/17/22 1320   Drainage Description Serous 11/17/22 1320   Non-staged Wound Description Full thickness 11/17/22 1320   Treatments Cleansed 11/17/22 1320             Debridement   Wound 11/10/22 Diabetic Ulcer Foot Right;Posterior    Universal Protocol:  Consent: Verbal consent obtained  Risks and benefits: risks, benefits and alternatives were discussed  Consent given by: patient  Patient understanding: patient states understanding of the procedure being performed  Patient consent: the patient's understanding of the procedure matches consent given  Patient identity confirmed: verbally with patient      Performed by: physician  Debridement type: surgical  Level of debridement: subcutaneous tissue    Pre-debridement measurements  Length (cm): 0 6  Width (cm): 0 6  Depth (cm): 0 3  Surface Area (cm^2): 0 36  Volume (cm^3): 0 11    Post-debridement measurements  Length (cm): 0 6  Width (cm): 0 6  Depth (cm): 0 4  Percent debrided: 100%  Surface Area (cm^2): 0 36  Area debrided (cm^2): 0 36  Volume (cm^3): 0 14  Tissue and other material debrided: subcutaneous tissue  Devitalized tissue debrided: biofilm, exudate, fibrin and slough  Instrument(s) utilized: curette  Bleeding: small  Hemostasis obtained with: pressure  Procedural pain (0-10): insensate  Post-procedural pain: insensate   Response to treatment: procedure was tolerated well           Results from last 6 Months   Lab Units 10/29/22  2032   WOUND CULTURE  4+ Growth of Beta Hemolytic Streptococcus Group G*  1+ Growth of - Presumptive Acinetobacter courvalinii - Acinetobacter species*         Wound Instructions:  No orders of the defined types were placed in this encounter  Phyllis Walters DPM      Portions of the record may have been created with voice recognition software  Occasional wrong word or "sound a like" substitutions may have occurred due to the inherent limitations of voice recognition software  Read the chart carefully and recognize, using context, where substitutions have occurred

## 2022-11-28 ENCOUNTER — APPOINTMENT (EMERGENCY)
Dept: RADIOLOGY | Facility: HOSPITAL | Age: 62
End: 2022-11-28

## 2022-11-28 ENCOUNTER — HOSPITAL ENCOUNTER (INPATIENT)
Facility: HOSPITAL | Age: 62
LOS: 4 days | Discharge: HOME | End: 2022-12-02
Attending: EMERGENCY MEDICINE | Admitting: FAMILY MEDICINE

## 2022-11-28 ENCOUNTER — TELEPHONE (OUTPATIENT)
Dept: WOUND CARE | Facility: CLINIC | Age: 62
End: 2022-11-28

## 2022-11-28 DIAGNOSIS — L03.115 CELLULITIS OF RIGHT LOWER EXTREMITY: Primary | ICD-10-CM

## 2022-11-28 DIAGNOSIS — N18.4 STAGE 4 CHRONIC KIDNEY DISEASE (HCC): ICD-10-CM

## 2022-11-28 DIAGNOSIS — K59.00 CONSTIPATION: ICD-10-CM

## 2022-11-28 DIAGNOSIS — E87.5 HYPERKALEMIA: ICD-10-CM

## 2022-11-28 DIAGNOSIS — L03.031 CELLULITIS OF RIGHT TOE: ICD-10-CM

## 2022-11-28 LAB
ALBUMIN SERPL BCP-MCNC: 3.7 G/DL (ref 3.5–5)
ALP SERPL-CCNC: 73 U/L (ref 46–116)
ALT SERPL W P-5'-P-CCNC: 27 U/L (ref 12–78)
ANION GAP SERPL CALCULATED.3IONS-SCNC: 11 MMOL/L (ref 4–13)
APTT PPP: 28 SECONDS (ref 23–37)
AST SERPL W P-5'-P-CCNC: 41 U/L (ref 5–45)
BASOPHILS # BLD AUTO: 0.03 THOUSANDS/ÂΜL (ref 0–0.1)
BASOPHILS NFR BLD AUTO: 0 % (ref 0–1)
BILIRUB SERPL-MCNC: 0.44 MG/DL (ref 0.2–1)
BUN SERPL-MCNC: 27 MG/DL (ref 5–25)
CALCIUM SERPL-MCNC: 8.8 MG/DL (ref 8.3–10.1)
CHLORIDE SERPL-SCNC: 102 MMOL/L (ref 96–108)
CO2 SERPL-SCNC: 23 MMOL/L (ref 21–32)
CREAT SERPL-MCNC: 1.85 MG/DL (ref 0.6–1.3)
CRP SERPL QL: 15.9 MG/L
EOSINOPHIL # BLD AUTO: 0.44 THOUSAND/ÂΜL (ref 0–0.61)
EOSINOPHIL NFR BLD AUTO: 6 % (ref 0–6)
ERYTHROCYTE [DISTWIDTH] IN BLOOD BY AUTOMATED COUNT: 13.1 % (ref 11.6–15.1)
ERYTHROCYTE [SEDIMENTATION RATE] IN BLOOD: 62 MM/HOUR (ref 0–19)
GFR SERPL CREATININE-BSD FRML MDRD: 37 ML/MIN/1.73SQ M
GLUCOSE SERPL-MCNC: 151 MG/DL (ref 65–140)
GLUCOSE SERPL-MCNC: 155 MG/DL (ref 65–140)
HCT VFR BLD AUTO: 32.2 % (ref 36.5–49.3)
HGB BLD-MCNC: 10.6 G/DL (ref 12–17)
IMM GRANULOCYTES # BLD AUTO: 0.03 THOUSAND/UL (ref 0–0.2)
IMM GRANULOCYTES NFR BLD AUTO: 0 % (ref 0–2)
INR PPP: 1.04 (ref 0.84–1.19)
LACTATE SERPL-SCNC: 1.3 MMOL/L (ref 0.5–2)
LIPASE SERPL-CCNC: 111 U/L (ref 73–393)
LYMPHOCYTES # BLD AUTO: 2.44 THOUSANDS/ÂΜL (ref 0.6–4.47)
LYMPHOCYTES NFR BLD AUTO: 31 % (ref 14–44)
MCH RBC QN AUTO: 34.2 PG (ref 26.8–34.3)
MCHC RBC AUTO-ENTMCNC: 32.9 G/DL (ref 31.4–37.4)
MCV RBC AUTO: 104 FL (ref 82–98)
MONOCYTES # BLD AUTO: 0.98 THOUSAND/ÂΜL (ref 0.17–1.22)
MONOCYTES NFR BLD AUTO: 12 % (ref 4–12)
NEUTROPHILS # BLD AUTO: 4.02 THOUSANDS/ÂΜL (ref 1.85–7.62)
NEUTS SEG NFR BLD AUTO: 51 % (ref 43–75)
NRBC BLD AUTO-RTO: 0 /100 WBCS
PLATELET # BLD AUTO: 181 THOUSANDS/UL (ref 149–390)
PMV BLD AUTO: 10.1 FL (ref 8.9–12.7)
POTASSIUM SERPL-SCNC: 5 MMOL/L (ref 3.5–5.3)
PROCALCITONIN SERPL-MCNC: 0.1 NG/ML
PROT SERPL-MCNC: 8 G/DL (ref 6.4–8.4)
PROTHROMBIN TIME: 13.7 SECONDS (ref 11.6–14.5)
RBC # BLD AUTO: 3.1 MILLION/UL (ref 3.88–5.62)
SODIUM SERPL-SCNC: 136 MMOL/L (ref 135–147)
WBC # BLD AUTO: 7.94 THOUSAND/UL (ref 4.31–10.16)

## 2022-11-28 RX ORDER — FERROUS SULFATE 325(65) MG
325 TABLET ORAL
Status: DISCONTINUED | OUTPATIENT
Start: 2022-11-29 | End: 2022-12-02 | Stop reason: HOSPADM

## 2022-11-28 RX ORDER — ALLOPURINOL 100 MG/1
100 TABLET ORAL DAILY
Status: DISCONTINUED | OUTPATIENT
Start: 2022-11-29 | End: 2022-12-02 | Stop reason: HOSPADM

## 2022-11-28 RX ORDER — ACETAMINOPHEN 325 MG/1
650 TABLET ORAL EVERY 6 HOURS PRN
Status: DISCONTINUED | OUTPATIENT
Start: 2022-11-28 | End: 2022-12-02 | Stop reason: HOSPADM

## 2022-11-28 RX ORDER — LOSARTAN POTASSIUM 25 MG/1
25 TABLET ORAL DAILY
Status: DISCONTINUED | OUTPATIENT
Start: 2022-11-29 | End: 2022-12-01

## 2022-11-28 RX ORDER — INSULIN LISPRO 100 [IU]/ML
2-12 INJECTION, SOLUTION INTRAVENOUS; SUBCUTANEOUS
Status: DISCONTINUED | OUTPATIENT
Start: 2022-11-29 | End: 2022-12-02 | Stop reason: HOSPADM

## 2022-11-28 RX ORDER — METOPROLOL SUCCINATE 100 MG/1
100 TABLET, EXTENDED RELEASE ORAL DAILY
Status: DISCONTINUED | OUTPATIENT
Start: 2022-11-29 | End: 2022-12-02 | Stop reason: HOSPADM

## 2022-11-28 RX ORDER — ATORVASTATIN CALCIUM 20 MG/1
20 TABLET, FILM COATED ORAL
Status: DISCONTINUED | OUTPATIENT
Start: 2022-11-28 | End: 2022-12-02 | Stop reason: HOSPADM

## 2022-11-28 RX ORDER — CEFAZOLIN SODIUM 2 G/50ML
2000 SOLUTION INTRAVENOUS EVERY 8 HOURS
Status: DISCONTINUED | OUTPATIENT
Start: 2022-11-29 | End: 2022-12-02 | Stop reason: HOSPADM

## 2022-11-28 RX ORDER — HEPARIN SODIUM 5000 [USP'U]/ML
5000 INJECTION, SOLUTION INTRAVENOUS; SUBCUTANEOUS EVERY 8 HOURS SCHEDULED
Status: DISCONTINUED | OUTPATIENT
Start: 2022-11-28 | End: 2022-12-02 | Stop reason: HOSPADM

## 2022-11-28 RX ORDER — ONDANSETRON 2 MG/ML
4 INJECTION INTRAMUSCULAR; INTRAVENOUS EVERY 6 HOURS PRN
Status: DISCONTINUED | OUTPATIENT
Start: 2022-11-28 | End: 2022-12-02 | Stop reason: HOSPADM

## 2022-11-28 RX ORDER — CEFTRIAXONE 1 G/50ML
1000 INJECTION, SOLUTION INTRAVENOUS ONCE
Status: COMPLETED | OUTPATIENT
Start: 2022-11-28 | End: 2022-11-28

## 2022-11-28 RX ORDER — FLUTICASONE PROPIONATE 110 UG/1
1 AEROSOL, METERED RESPIRATORY (INHALATION) EVERY 12 HOURS SCHEDULED
Status: DISCONTINUED | OUTPATIENT
Start: 2022-11-28 | End: 2022-12-02 | Stop reason: HOSPADM

## 2022-11-28 RX ORDER — PANTOPRAZOLE SODIUM 40 MG/1
40 TABLET, DELAYED RELEASE ORAL
Status: DISCONTINUED | OUTPATIENT
Start: 2022-11-29 | End: 2022-12-02 | Stop reason: HOSPADM

## 2022-11-28 RX ORDER — LEVOTHYROXINE SODIUM 0.15 MG/1
150 TABLET ORAL
Status: DISCONTINUED | OUTPATIENT
Start: 2022-11-29 | End: 2022-12-02 | Stop reason: HOSPADM

## 2022-11-28 RX ORDER — ALBUTEROL SULFATE 90 UG/1
2 AEROSOL, METERED RESPIRATORY (INHALATION) EVERY 4 HOURS PRN
Status: DISCONTINUED | OUTPATIENT
Start: 2022-11-28 | End: 2022-12-02 | Stop reason: HOSPADM

## 2022-11-28 RX ORDER — INSULIN LISPRO 100 [IU]/ML
2-12 INJECTION, SOLUTION INTRAVENOUS; SUBCUTANEOUS
Status: DISCONTINUED | OUTPATIENT
Start: 2022-11-28 | End: 2022-12-02 | Stop reason: HOSPADM

## 2022-11-28 RX ADMIN — VANCOMYCIN HYDROCHLORIDE 1500 MG: 5 INJECTION, POWDER, LYOPHILIZED, FOR SOLUTION INTRAVENOUS at 16:14

## 2022-11-28 RX ADMIN — HEPARIN SODIUM 5000 UNITS: 5000 INJECTION INTRAVENOUS; SUBCUTANEOUS at 22:51

## 2022-11-28 RX ADMIN — INSULIN LISPRO 2 UNITS: 100 INJECTION, SOLUTION INTRAVENOUS; SUBCUTANEOUS at 22:51

## 2022-11-28 RX ADMIN — CEFTRIAXONE 1000 MG: 1 INJECTION, SOLUTION INTRAVENOUS at 15:38

## 2022-11-28 NOTE — TELEPHONE ENCOUNTER
Contacted patient at 09:50  Pain is spreading up to "behind his knee" and "ankle is warm"  Stated "feels like when I first got the infection"  Advised patient to report to ER

## 2022-11-28 NOTE — ASSESSMENT & PLAN NOTE
• Continue PTA metoprolol succinate 100 mg daily and losartan 25 mg daily   • Trend blood pressures  • Controlled on current regimen, avoid hypotension

## 2022-11-28 NOTE — ASSESSMENT & PLAN NOTE
Presented to ED with right foot wound with increasing redness and swelling to the foot with increased pain and now drainage of pus  • Outpatient follows with Podiatry - podiatry recommending admission for MRI due to concern for osteomyelitis    • Imaging- x-ray of right foot without signs for osteomyelitis, MRI pending   • WBC wnl  • Procal pending  • Temp afebrile  • BC drawn - will follow and adjust antibiotics as needed     • Was started on vancomycin and Rocephin in the ED; will switch to Ancef  • Follow CBC and fever curve  • Will consider ID consult if osteomyelitis on MRI

## 2022-11-28 NOTE — ASSESSMENT & PLAN NOTE
Lab Results   Component Value Date    HGBA1C 6 5 (H) 08/17/2022       No results for input(s): POCGLU in the last 72 hours      Blood Sugar Average: Last 72 hrs:  •  Glipizide on hold with CKD 4  •  sliding scale insulin coverage with Accu-Cheks  • Carbohydrate controlled diet  • Hypoglycemia protocol

## 2022-11-28 NOTE — ASSESSMENT & PLAN NOTE
• Chronic anemia  • Hemoglobin baseline appears to be around 9-10 recently - Probable secondary to CKD 4  • Monitor closely

## 2022-11-28 NOTE — ASSESSMENT & PLAN NOTE
Lab Results   Component Value Date    EGFR 37 11/28/2022    EGFR 36 11/08/2022    EGFR 30 11/03/2022    CREATININE 1 85 (H) 11/28/2022    CREATININE 1 91 (H) 11/08/2022    CREATININE 2 19 (H) 11/03/2022     • Review of care everywhere, patient has CKD with creatinine > 2 since 2020  • Creatinine at baseline on admission- history CKD 4  • Referred to Nephrology by PCP but declined  • Renally dose medications, avoid hypotension, avoid nephrotoxins  • Remains at baseline

## 2022-11-28 NOTE — H&P
[address] [facility] 1960, 57 y o  male MRN: [id number]  Unit/Bed#: [facility] Encounter: [id number]  Primary Care Provider: Maura Oseguera MD   Date and time admitted to hospital: 11/28/2022  2:34 PM    * Cellulitis of right toe  Assessment & Plan  Presented to ED with right foot wound with increasing redness and swelling to the foot with increased pain and now drainage of pus  • Outpatient follows with Podiatry - podiatry recommending admission for MRI due to concern for osteomyelitis    • Imaging- x-ray of right foot without signs for osteomyelitis, MRI pending   • WBC wnl  • Procal pending  • Temp afebrile  • BC drawn - will follow and adjust antibiotics as needed     • Was started on vancomycin and Rocephin in the ED; will switch to Ancef  • Follow CBC and fever curve  • Will consider ID consult if osteomyelitis on MRI      Type 2 diabetes mellitus with circulatory disorder, without long-term current use of insulin (HCC)  Assessment & Plan  Lab Results   Component Value Date    HGBA1C 6 5 (H) 08/17/2022       No results for input(s): POCGLU in the last 72 hours      Blood Sugar Average: Last 72 hrs:  •  Glipizide on hold with CKD 4  •  sliding scale insulin coverage with Accu-Cheks  • Carbohydrate controlled diet  • Hypoglycemia protocol      Chronic anemia  Assessment & Plan  • Chronic anemia  • Hemoglobin baseline appears to be around 9-10 recently - Probable secondary to CKD 4  • Monitor closely    Essential hypertension  Assessment & Plan  • Continue PTA metoprolol succinate 100 mg daily and losartan 25 mg daily   • Trend blood pressures  • Controlled on current regimen, avoid hypotension    Stage 4 chronic kidney disease [facility])  Assessment & Plan  Lab Results   Component Value Date    EGFR 37 11/28/2022    EGFR 36 11/08/2022    EGFR 30 11/03/2022    CREATININE 1 85 (H) 11/28/2022    CREATININE 1 91 (H) 11/08/2022    CREATININE 2 19 (H) 11/03/2022     • Review of care everywhere, patient has CKD with creatinine > 2 since 2020  • Creatinine at baseline on admission- history CKD 4  • Referred to Nephrology by PCP but declined  • Renally dose medications, avoid hypotension, avoid nephrotoxins  • Remains at baseline    VTE Pharmacologic Prophylaxis: VTE Score: 4 Moderate Risk (Score 3-4) - Pharmacological DVT Prophylaxis Ordered: heparin  Code Status: Level 1 - Full Code   Discussion with family: Updated  (wife) at bedside  Anticipated Length of Stay: Patient will be admitted on an inpatient basis with an anticipated length of stay of greater than 2 midnights secondary to Cellulitis with concern for osteomyelitis  Total Time for Visit, including Counseling / Coordination of Care: 60 minutes Greater than 50% of this total time spent on direct patient counseling and coordination of care  Chief Complaint:  Right foot pain    History of Present Illness:  Alexa Oliveira is a 57 y o  male with a PMH of recent partial right toe amputation, diabetes, chronic wounds, hypertension, hypothyroidism, CKD stage IV who presents with 1-2 days of increasing redness and swelling to the right foot, increased pain with pus drainage  He has not had any fevers or chills at home, no nausea vomiting or diarrhea, pain radiates to the lower leg with no known trauma to this  Does follow with Podiatry as outpatient had appointment in early December  Was recently admitted in October for osteomyelitis of right toe and partial 2nd right toe amputation at that time  Admitted under medicine for IV antibiotics, MRI and podiatry consult    Review of Systems:  Review of Systems   Constitutional: Negative for appetite change, chills and fever  HENT: Negative for ear pain and sore throat  Eyes: Negative for pain and visual disturbance  Respiratory: Negative for cough and shortness of breath  Cardiovascular: Negative for chest pain and palpitations     Gastrointestinal: Negative for abdominal pain and vomiting  Genitourinary: Negative for dysuria and hematuria  Musculoskeletal: Positive for arthralgias  Negative for back pain  Skin: Positive for color change and wound  Negative for rash  Neurological: Negative for seizures and syncope  All other systems reviewed and are negative  Past Medical and Surgical History:   Past Medical History:   Diagnosis Date   • Chronic kidney failure, stage 2 (mild)    • Diabetes mellitus ([facility] [address] )    • GERD (gastroesophageal reflux disease)    • Gout    • Hypertension        Past Surgical History:   Procedure Laterality Date   • ANKLE FRACTURE SURGERY Right    • CHOLECYSTECTOMY     • TOE AMPUTATION Right 11/2/2022    Procedure: RIGHT 2ND TOE PARTIAL AMPUTATION and right foot wound debridement;  Surgeon: Aric Clifford DPM;  Location:  MAIN OR;  Service: Podiatry       Meds/Allergies:  Prior to Admission medications    Medication Sig Start Date End Date Taking?  Authorizing Provider   albuterol (PROVENTIL HFA,VENTOLIN HFA) 90 mcg/act inhaler Inhale 2 puffs every 4 (four) hours as needed    Historical Provider, MD   allopurinol (ZYLOPRIM) 100 mg tablet Take by mouth    Historical Provider, MD   aspirin (ECOTRIN LOW STRENGTH) 81 mg EC tablet Take by mouth    Historical Provider, MD   atorvastatin (LIPITOR) 20 mg tablet     Historical Provider, MD   Cinnamon 500 MG capsule Take by mouth    Historical Provider, MD   co-enzyme Q-10 100 mg capsule Take by mouth    Historical Provider, MD   ferrous sulfate 324 (65 Fe) mg Take 1 tablet (324 mg total) by mouth every other day 11/15/22   SyedPaloma DO Stephanie   fluticasone (FLOVENT HFA) 110 MCG/ACT inhaler Inhale    Historical Provider, MD   glipiZIDE (GLUCOTROL XL) 5 mg 24 hr tablet TAKE 1 TABLET DAILY 30 MINUTES BEFORE A MEAL (REPLACES 2 5 MG) 3/29/22   Historical Provider, MD   levothyroxine 150 mcg tablet TAKE 1 TABLET DAILY AT LEAST 30 MINUTES PRIOR TO BREAKFAST OR OTHER MEDICATIONS (REPLACES 137 MCG) 6/27/22   Historical Provider, MD   losartan (COZAAR) 25 mg tablet Take 1 tablet (25 mg total) by mouth daily 11/15/22   SyedTrenton DO Stephanie   metoprolol succinate (TOPROL-XL) 100 mg 24 hr tablet Take 100 mg by mouth daily    Historical Provider, MD   omeprazole (PriLOSEC) 20 mg delayed release capsule     Historical Provider, MD   atenolol (TENORMIN) 50 mg tablet Take by mouth  10/30/22  Historical Provider, MD     I have reviewed home medications with patient personally  Allergies: Allergies   Allergen Reactions   • Aspirin Other (See Comments)     Not an allergy, take off as precaution for the kidneys per patient  • Other Dermatitis   • Felodipine Other (See Comments)     Dizzy     • Lisinopril Other (See Comments)     hyperkalemia     • Niacin Itching     flushing and itching     • Nsaids Other (See Comments)     Low GFR   • Simvastatin Other (See Comments)     pancreatitis         Social History:  Marital Status: Single   Occupation: Unknown  Patient Pre-hospital Living Situation: Home  Patient Pre-hospital Level of Mobility: walks  Patient Pre-hospital Diet Restrictions: Diabetic  Substance Use History:   Social History     Substance and Sexual Activity   Alcohol Use Not Currently     Social History     Tobacco Use   Smoking Status Former   • Packs/day: 1 00   • Types: Cigarettes   Smokeless Tobacco Never     Social History     Substance and Sexual Activity   Drug Use Never       Family History:  Family History   Problem Relation Age of Onset   • Gout Mother    • Diabetes Father    • Heart disease Father        Physical Exam:     Vitals:   Blood Pressure: 153/78 (11/28/22 1749)  Pulse: 59 (11/28/22 1749)  Temperature: 98 °F (36 7 °C) (11/28/22 1749)  Temp Source: Oral (11/28/22 1749)  Respirations: 16 (11/28/22 1259)  Weight - Scale: 108 kg (238 lb) (11/28/22 1259)  SpO2: 97 % (11/28/22 1749)    Physical Exam  Vitals and nursing note reviewed     Constitutional:       General: He is not in acute distress  Appearance: Normal appearance  HENT:      Head: Normocephalic and atraumatic  Nose: No congestion  Mouth/Throat:      Mouth: Mucous membranes are moist    Eyes:      Conjunctiva/sclera: Conjunctivae normal    Cardiovascular:      Rate and Rhythm: Normal rate and regular rhythm  Pulses: Normal pulses  Heart sounds: Normal heart sounds  No murmur heard  Pulmonary:      Effort: Pulmonary effort is normal  No respiratory distress  Breath sounds: Normal breath sounds  Abdominal:      General: Bowel sounds are normal       Palpations: Abdomen is soft  Tenderness: There is no abdominal tenderness  Musculoskeletal:         General: Normal range of motion  Right lower leg: No edema  Left lower leg: No edema  Skin:     General: Skin is warm and dry  Findings: Erythema (Right 3rd toe) and lesion (Plantar aspect of right foot, seems to probe deep) present  Neurological:      Mental Status: He is alert and oriented to person, place, and time            Additional Data:     Lab Results:  Results from last 7 days   Lab Units 11/28/22  1613   WBC Thousand/uL 7 94   HEMOGLOBIN g/dL 10 6*   HEMATOCRIT % 32 2*   PLATELETS Thousands/uL 181   NEUTROS PCT % 51   LYMPHS PCT % 31   MONOS PCT % 12   EOS PCT % 6     Results from last 7 days   Lab Units 11/28/22  1519   SODIUM mmol/L 136   POTASSIUM mmol/L 5 0   CHLORIDE mmol/L 102   CO2 mmol/L 23   BUN mg/dL 27*   CREATININE mg/dL 1 85*   ANION GAP mmol/L 11   CALCIUM mg/dL 8 8   ALBUMIN g/dL 3 7   TOTAL BILIRUBIN mg/dL 0 44   ALK PHOS U/L 73   ALT U/L 27   AST U/L 41   GLUCOSE RANDOM mg/dL 155*     Results from last 7 days   Lab Units 11/28/22  1519   INR  1 04             Results from last 7 days   Lab Units 11/28/22  1519   LACTIC ACID mmol/L 1 3       Lines/Drains:  Invasive Devices     Peripheral Intravenous Line  Duration           Peripheral IV 11/28/22 Right;Ventral (anterior) Hand <1 day                    Imaging: Reviewed radiology reports from this admission including: xray(s)  XR foot 3+ views RIGHT   ED Interpretation by Sherman Sauceda MD (11/28 1514)   No osteo      Final Result by Will Morse MD (11/28 1522)      No acute osseous abnormality  No radiographic findings of acute osteomyelitis  Workstation performed: [id number]         MRI inpatient order    (Results Pending)       EKG and Other Studies Reviewed on Admission:   · EKG: No EKG obtained  ** Please Note: This note has been constructed using a voice recognition system   **

## 2022-11-28 NOTE — ED PROVIDER NOTES
History  Chief Complaint   Patient presents with   • Wound Check     Pt reports R foot wound that is being followed by surgery and wound care  Reports increased pain and drainage from wound last night      Patient is being followed by Podiatry for right foot wound  Now for the last 1-2 days with increasing redness and swelling to the foot  Increased pain  Now with pus drainage  Is diabetic  No fevers or chills  No nausea vomiting or diarrhea  States the pain radiates up to the lower leg  No trauma  History provided by:  Patient   used: No    Medical Problem  Location:  Right foot pain  Quality:  Achy  Severity:  Mild  Onset quality:  Gradual  Duration:  2 days  Timing:  Constant  Progression:  Worsening  Chronicity:  New  Context:  Chronic right foot wound that is now swollen and red and tender  Relieved by:  Nothing  Worsened by:  Palpation  Ineffective treatments:  None tried  Associated symptoms: no abdominal pain, no chest pain, no congestion, no cough, no diarrhea, no ear pain, no fever, no headaches, no nausea, no rash, no shortness of breath, no sore throat, no vomiting and no wheezing        Prior to Admission Medications   Prescriptions Last Dose Informant Patient Reported? Taking?    Cinnamon 500 MG capsule   Yes No   Sig: Take by mouth   albuterol (PROVENTIL HFA,VENTOLIN HFA) 90 mcg/act inhaler   Yes No   Sig: Inhale 2 puffs every 4 (four) hours as needed   allopurinol (ZYLOPRIM) 100 mg tablet   Yes No   Sig: Take by mouth   aspirin (ECOTRIN LOW STRENGTH) 81 mg EC tablet   Yes No   Sig: Take by mouth   atorvastatin (LIPITOR) 20 mg tablet   Yes No   co-enzyme Q-10 100 mg capsule   Yes No   Sig: Take by mouth   ferrous sulfate 324 (65 Fe) mg   No No   Sig: Take 1 tablet (324 mg total) by mouth every other day   fluticasone (FLOVENT HFA) 110 MCG/ACT inhaler   Yes No   Sig: Inhale   glipiZIDE (GLUCOTROL XL) 5 mg 24 hr tablet   Yes No   Sig: TAKE 1 TABLET DAILY 30 MINUTES BEFORE A MEAL (REPLACES 2 5 MG)   levothyroxine 150 mcg tablet   Yes No   Sig: TAKE 1 TABLET DAILY AT LEAST 30 MINUTES PRIOR TO BREAKFAST OR OTHER MEDICATIONS (REPLACES 137 MCG)   losartan (COZAAR) 25 mg tablet   No No   Sig: Take 1 tablet (25 mg total) by mouth daily   metoprolol succinate (TOPROL-XL) 100 mg 24 hr tablet   Yes No   Sig: Take 100 mg by mouth daily   omeprazole (PriLOSEC) 20 mg delayed release capsule   Yes No      Facility-Administered Medications: None       Past Medical History:   Diagnosis Date   • Chronic kidney failure, stage 2 (mild)    • Diabetes mellitus (HCC)    • GERD (gastroesophageal reflux disease)    • Gout    • Hypertension        Past Surgical History:   Procedure Laterality Date   • ANKLE FRACTURE SURGERY Right    • CHOLECYSTECTOMY     • TOE AMPUTATION Right 11/2/2022    Procedure: RIGHT 2ND TOE PARTIAL AMPUTATION and right foot wound debridement;  Surgeon: Prema Marx DPM;  [facility]: [facility];  Service: Podiatry       Family History   Problem Relation Age of Onset   • Gout Mother    • Diabetes Father    • Heart disease Father      I have reviewed and agree with the history as documented  E-Cigarette/Vaping   • E-Cigarette Use Former User    • Comments Quit about 35-40 years ago      E-Cigarette/Vaping Substances     Social History     Tobacco Use   • Smoking status: Former     Packs/day: 1 00     Types: Cigarettes   • Smokeless tobacco: Never   Vaping Use   • Vaping Use: Former   Substance Use Topics   • Alcohol use: Not Currently   • Drug use: Never       Review of Systems   Constitutional: Negative for chills and fever  HENT: Negative for congestion, ear pain, hearing loss, sore throat, trouble swallowing and voice change  Eyes: Negative for pain and discharge  Respiratory: Negative for cough, shortness of breath and wheezing  Cardiovascular: Negative for chest pain and palpitations     Gastrointestinal: Negative for abdominal pain, blood in stool, constipation, diarrhea, nausea and vomiting  Genitourinary: Negative for dysuria, flank pain, frequency and hematuria  Musculoskeletal: Positive for arthralgias and joint swelling  Negative for neck pain and neck stiffness  Skin: Negative for rash and wound  Neurological: Negative for dizziness, seizures, syncope, facial asymmetry and headaches  Psychiatric/Behavioral: Negative for hallucinations, self-injury and suicidal ideas  All other systems reviewed and are negative  Physical Exam  Physical Exam  Vitals and nursing note reviewed  Constitutional:       General: He is not in acute distress  Appearance: He is well-developed  HENT:      Head: Normocephalic and atraumatic  Right Ear: External ear normal       Left Ear: External ear normal    Eyes:      General: No scleral icterus  Right eye: No discharge  Left eye: No discharge  Extraocular Movements: Extraocular movements intact  Conjunctiva/sclera: Conjunctivae normal    Cardiovascular:      Rate and Rhythm: Normal rate and regular rhythm  Heart sounds: Normal heart sounds  No murmur heard  Pulmonary:      Effort: Pulmonary effort is normal       Breath sounds: Normal breath sounds  No wheezing or rales  Abdominal:      General: Bowel sounds are normal  There is no distension  Palpations: Abdomen is soft  Tenderness: There is no abdominal tenderness  There is no guarding or rebound  Musculoskeletal:         General: No deformity  Normal range of motion  Cervical back: Normal range of motion and neck supple  Comments: Open wound noted at the ball the foot  There is no discharge  There is tenderness  Mild swelling  There is erythema to the 2nd and 3rd toes  Skin:     General: Skin is warm and dry  Findings: No rash  Neurological:      General: No focal deficit present  Mental Status: He is alert and oriented to person, place, and time  Cranial Nerves:  No cranial nerve deficit  Psychiatric:         Mood and Affect: Mood normal          Behavior: Behavior normal          Thought Content:  Thought content normal          Judgment: Judgment normal          Vital Signs  ED Triage Vitals [11/28/22 1259]   Temperature Pulse Respirations Blood Pressure SpO2   98 °F (36 7 °C) 67 16 154/89 98 %      Temp src Heart Rate Source Patient Position - Orthostatic VS BP Location FiO2 (%)   -- -- Lying Right arm --      Pain Score       6           Vitals:    11/28/22 1259   BP: 154/89   Pulse: 67   Patient Position - Orthostatic VS: Lying         Visual Acuity      ED Medications  Medications   vancomycin (VANCOCIN) 1500 mg in sodium chloride 0 9% 250 mL IVPB (1,500 mg Intravenous New Bag 11/28/22 1614)   cefTRIAXone (ROCEPHIN) IVPB (premix in dextrose) 1,000 mg 50 mL (0 mg Intravenous Stopped 11/28/22 1608)       Diagnostic Studies  Results Reviewed     Procedure Component Value Units Date/Time    Sedimentation rate, automated [[id number]]  (Abnormal) Collected: 11/28/22 1620    Lab Status: Final result Specimen: Blood Updated: 11/28/22 1627     Sed Rate 62 mm/hour     C-reactive protein [[id number]]  (Abnormal) Collected: 11/28/22 1519    Lab Status: Final result Specimen: Blood from Arm, Left Updated: 11/28/22 1626     CRP 15 9 mg/L     CBC and differential [[id number]]  (Abnormal) Collected: 11/28/22 1613    Lab Status: Final result Specimen: Blood from Line, Venous Updated: 11/28/22 1623     WBC 7 94 Thousand/uL      RBC 3 10 Million/uL      Hemoglobin 10 6 g/dL      Hematocrit 32 2 %       fL      MCH 34 2 pg      MCHC 32 9 g/dL      RDW 13 1 %      MPV 10 1 fL      Platelets 680 Thousands/uL      nRBC 0 /100 WBCs      Neutrophils Relative 51 %      Immat GRANS % 0 %      Lymphocytes Relative 31 %      Monocytes Relative 12 %      Eosinophils Relative 6 %      Basophils Relative 0 %      Neutrophils Absolute 4 02 Thousands/µL      Immature Grans Absolute 0 03 Thousand/uL Lymphocytes Absolute 2 44 Thousands/µL      Monocytes Absolute 0 98 Thousand/µL      Eosinophils Absolute 0 44 Thousand/µL      Basophils Absolute 0 03 Thousands/µL     Comprehensive metabolic panel [[id number]]  (Abnormal) Collected: 11/28/22 1519    Lab Status: Final result Specimen: Blood from Arm, Left Updated: 11/28/22 1559     Sodium 136 mmol/L      Potassium 5 0 mmol/L      Chloride 102 mmol/L      CO2 23 mmol/L      ANION GAP 11 mmol/L      BUN 27 mg/dL      Creatinine 1 85 mg/dL      Glucose 155 mg/dL      Calcium 8 8 mg/dL      AST 41 U/L      ALT 27 U/L      Alkaline Phosphatase 73 U/L      Total Protein 8 0 g/dL      Albumin 3 7 g/dL      Total Bilirubin 0 44 mg/dL      eGFR 37 ml/min/1 73sq m     Narrative:      [place] guidelines for Chronic Kidney Disease (CKD):   •  Stage 1 with normal or high GFR (GFR > 90 mL/min/1 73 square meters)  •  Stage 2 Mild CKD (GFR = 60-89 mL/min/1 73 square meters)  •  Stage 3A Moderate CKD (GFR = 45-59 mL/min/1 73 square meters)  •  Stage 3B Moderate CKD (GFR = 30-44 mL/min/1 73 square meters)  •  Stage 4 Severe CKD (GFR = 15-29 mL/min/1 73 square meters)  •  Stage 5 End Stage CKD (GFR <15 mL/min/1 73 square meters)  Note: GFR calculation is accurate only with a steady state creatinine    Lipase [[id number]]  (Normal) Collected: 11/28/22 1519    Lab Status: Final result Specimen: Blood from Arm, Left Updated: 11/28/22 1559     Lipase 111 u/L     Protime-INR [[id number]]  (Normal) Collected: 11/28/22 1519    Lab Status: Final result Specimen: Blood from Arm, Left Updated: 11/28/22 1559     Protime 13 7 seconds      INR 1 04    APTT [[id number]]  (Normal) Collected: 11/28/22 1519    Lab Status: Final result Specimen: Blood from Arm, Left Updated: 11/28/22 1559     PTT 28 seconds     Lactic acid [[id number]]  (Normal) Collected: 11/28/22 1519    Lab Status: Final result Specimen: Blood from Arm, Left Updated: 11/28/22 1551     LACTIC ACID 1 3 mmol/L     Narrative:      Result may be elevated if tourniquet was used during collection  Blood culture #1 [[id number]] Collected: 11/28/22 1519    Lab Status: In process Specimen: Blood from Hand, Left Updated: 11/28/22 1528    Blood culture #2 [[id number]] Collected: 11/28/22 1519    Lab Status: In process Specimen: Blood from Arm, Left Updated: 11/28/22 1528                 XR foot 3+ views RIGHT   ED Interpretation by Rashard Vargas MD (11/28 1514)   No osteo      Final Result by Philip Curtis MD (11/28 1522)      No acute osseous abnormality  No radiographic findings of acute osteomyelitis  Workstation performed: [id number]                    Procedures  Procedures         ED Course  ED Course as of 11/28/22 1642   Mon Nov 28, 2022   1459 No osteo noted on foot x-ray  [id number] Discussed with podiatry on-call  Recommends to admit for IV antibiotics and MRI  SBIRT 20yo+    Flowsheet Row Most Recent Value   SBIRT (23 yo +)    In order to provide better care to our patients, we are screening all of our patients for alcohol and drug use  Would it be okay to ask you these screening questions? Yes Filed at: 11/28/2022 1442   Initial Alcohol Screen: US AUDIT-C     1  How often do you have a drink containing alcohol? 0 Filed at: 11/28/2022 1442   2  How many drinks containing alcohol do you have on a typical day you are drinking? 0 Filed at: 11/28/2022 1442   3a  Male UNDER 65: How often do you have five or more drinks on one occasion? 0 Filed at: 11/28/2022 1442   3b  FEMALE Any Age, or MALE 65+: How often do you have 4 or more drinks on one occassion? 0 Filed at: 11/28/2022 1442   Audit-C Score 0 Filed at: 11/28/2022 1442   NICOLE: How many times in the past year have you    Used an illegal drug or used a prescription medication for non-medical reasons?  Never Filed at: 11/28/2022 1442                    MDM  Number of Diagnoses or Management Options              Disposition  Final diagnoses:   Cellulitis of right lower extremity     Time reflects when diagnosis was documented in both MDM as applicable and the Disposition within this note     Time User Action Codes Description Comment    11/28/2022  2:59 PM Saray Pepper Add [L03 115] Cellulitis of right lower extremity       ED Disposition     ED Disposition   Admit    Condition   Stable    Date/Time   Mon Nov 28, 2022  4:39 PM    Comment   Case was discussed with Dr Alba Humphries and the patient's admission status was agreed to be  to the service of Dr Alba Humphries            Follow-up Information    None         Patient's Medications   Discharge Prescriptions    No medications on file       No discharge procedures on file      PDMP Review     None          ED Provider  Electronically Signed by           Luke Cooper MD  11/28/22 6787

## 2022-11-28 NOTE — TELEPHONE ENCOUNTER
Patient left voicemail message at the [facility] wound center 11/28/2022 at 08:21, reported "pain and increased discharge" from wound on foot  He requested to be seen earlier this week, and he has a follow up appointment on 12/1 with Dr Ron Yanes Text message sent to Dr Ron Hernandez to advise, can schedule with Dr Piper Chilel 11/29/2022 or direct patient to ER

## 2022-11-29 ENCOUNTER — ANESTHESIA EVENT (INPATIENT)
Dept: PERIOP | Facility: HOSPITAL | Age: 62
End: 2022-11-29

## 2022-11-29 ENCOUNTER — APPOINTMENT (INPATIENT)
Dept: MRI IMAGING | Facility: HOSPITAL | Age: 62
End: 2022-11-29

## 2022-11-29 LAB
ANION GAP SERPL CALCULATED.3IONS-SCNC: 8 MMOL/L (ref 4–13)
BASOPHILS # BLD AUTO: 0.03 THOUSANDS/ÂΜL (ref 0–0.1)
BASOPHILS NFR BLD AUTO: 0 % (ref 0–1)
BUN SERPL-MCNC: 26 MG/DL (ref 5–25)
CALCIUM SERPL-MCNC: 9.2 MG/DL (ref 8.3–10.1)
CHLORIDE SERPL-SCNC: 104 MMOL/L (ref 96–108)
CO2 SERPL-SCNC: 28 MMOL/L (ref 21–32)
CREAT SERPL-MCNC: 2.08 MG/DL (ref 0.6–1.3)
EOSINOPHIL # BLD AUTO: 0.46 THOUSAND/ÂΜL (ref 0–0.61)
EOSINOPHIL NFR BLD AUTO: 6 % (ref 0–6)
ERYTHROCYTE [DISTWIDTH] IN BLOOD BY AUTOMATED COUNT: 13.1 % (ref 11.6–15.1)
GFR SERPL CREATININE-BSD FRML MDRD: 32 ML/MIN/1.73SQ M
GLUCOSE SERPL-MCNC: 113 MG/DL (ref 65–140)
GLUCOSE SERPL-MCNC: 121 MG/DL (ref 65–140)
GLUCOSE SERPL-MCNC: 159 MG/DL (ref 65–140)
GLUCOSE SERPL-MCNC: 164 MG/DL (ref 65–140)
GLUCOSE SERPL-MCNC: 87 MG/DL (ref 65–140)
HCT VFR BLD AUTO: 34.4 % (ref 36.5–49.3)
HGB BLD-MCNC: 11.1 G/DL (ref 12–17)
IMM GRANULOCYTES # BLD AUTO: 0.03 THOUSAND/UL (ref 0–0.2)
IMM GRANULOCYTES NFR BLD AUTO: 0 % (ref 0–2)
LYMPHOCYTES # BLD AUTO: 3.09 THOUSANDS/ÂΜL (ref 0.6–4.47)
LYMPHOCYTES NFR BLD AUTO: 40 % (ref 14–44)
MCH RBC QN AUTO: 33.6 PG (ref 26.8–34.3)
MCHC RBC AUTO-ENTMCNC: 32.3 G/DL (ref 31.4–37.4)
MCV RBC AUTO: 104 FL (ref 82–98)
MONOCYTES # BLD AUTO: 0.82 THOUSAND/ÂΜL (ref 0.17–1.22)
MONOCYTES NFR BLD AUTO: 11 % (ref 4–12)
NEUTROPHILS # BLD AUTO: 3.3 THOUSANDS/ÂΜL (ref 1.85–7.62)
NEUTS SEG NFR BLD AUTO: 43 % (ref 43–75)
NRBC BLD AUTO-RTO: 0 /100 WBCS
PLATELET # BLD AUTO: 178 THOUSANDS/UL (ref 149–390)
PMV BLD AUTO: 10.1 FL (ref 8.9–12.7)
POTASSIUM SERPL-SCNC: 4.8 MMOL/L (ref 3.5–5.3)
RBC # BLD AUTO: 3.3 MILLION/UL (ref 3.88–5.62)
SODIUM SERPL-SCNC: 140 MMOL/L (ref 135–147)
WBC # BLD AUTO: 7.73 THOUSAND/UL (ref 4.31–10.16)

## 2022-11-29 RX ORDER — OXYCODONE HYDROCHLORIDE 5 MG/1
5 TABLET ORAL EVERY 6 HOURS PRN
Status: DISCONTINUED | OUTPATIENT
Start: 2022-11-29 | End: 2022-12-02 | Stop reason: HOSPADM

## 2022-11-29 RX ADMIN — OXYCODONE HYDROCHLORIDE 5 MG: 5 TABLET ORAL at 14:20

## 2022-11-29 RX ADMIN — HEPARIN SODIUM 5000 UNITS: 5000 INJECTION INTRAVENOUS; SUBCUTANEOUS at 14:21

## 2022-11-29 RX ADMIN — HEPARIN SODIUM 5000 UNITS: 5000 INJECTION INTRAVENOUS; SUBCUTANEOUS at 21:26

## 2022-11-29 RX ADMIN — OXYCODONE HYDROCHLORIDE 5 MG: 5 TABLET ORAL at 21:30

## 2022-11-29 RX ADMIN — INSULIN LISPRO 2 UNITS: 100 INJECTION, SOLUTION INTRAVENOUS; SUBCUTANEOUS at 21:26

## 2022-11-29 RX ADMIN — CEFAZOLIN SODIUM 2000 MG: 2 SOLUTION INTRAVENOUS at 14:26

## 2022-11-29 RX ADMIN — INSULIN LISPRO 2 UNITS: 100 INJECTION, SOLUTION INTRAVENOUS; SUBCUTANEOUS at 12:12

## 2022-11-29 RX ADMIN — OXYCODONE HYDROCHLORIDE 5 MG: 5 TABLET ORAL at 00:49

## 2022-11-29 RX ADMIN — CEFAZOLIN SODIUM 2000 MG: 2 SOLUTION INTRAVENOUS at 21:25

## 2022-11-29 RX ADMIN — LEVOTHYROXINE SODIUM 150 MCG: 150 TABLET ORAL at 05:53

## 2022-11-29 RX ADMIN — FERROUS SULFATE TAB 325 MG (65 MG ELEMENTAL FE) 325 MG: 325 (65 FE) TAB at 08:46

## 2022-11-29 RX ADMIN — METOPROLOL SUCCINATE 100 MG: 100 TABLET, EXTENDED RELEASE ORAL at 08:46

## 2022-11-29 RX ADMIN — PANTOPRAZOLE SODIUM 40 MG: 40 TABLET, DELAYED RELEASE ORAL at 05:53

## 2022-11-29 RX ADMIN — HEPARIN SODIUM 5000 UNITS: 5000 INJECTION INTRAVENOUS; SUBCUTANEOUS at 05:53

## 2022-11-29 RX ADMIN — FLUTICASONE PROPIONATE 1 PUFF: 110 AEROSOL, METERED RESPIRATORY (INHALATION) at 08:46

## 2022-11-29 RX ADMIN — LOSARTAN POTASSIUM 25 MG: 25 TABLET, FILM COATED ORAL at 08:47

## 2022-11-29 RX ADMIN — ALLOPURINOL 100 MG: 100 TABLET ORAL at 08:47

## 2022-11-29 NOTE — ASSESSMENT & PLAN NOTE
Lab Results   Component Value Date    HGBA1C 6 5 (H) 08/17/2022       Recent Labs     11/28/22  2250 11/29/22  0754 11/29/22  1047   POCGLU 151* 121 159*       Blood Sugar Average: Last 72 hrs:  • (P) 098 [id number] Glipizide on hold with CKD 4  •  sliding scale insulin coverage with Accu-Cheks  • Carbohydrate controlled diet  • Hypoglycemia protocol

## 2022-11-29 NOTE — ASSESSMENT & PLAN NOTE
Presented to ED with right foot wound with increasing redness and swelling to the foot with increased pain and now drainage of pus  • Outpatient follows with Podiatry - podiatry recommending admission for MRI due to concern for osteomyelitis    • Imaging- x-ray of right foot without signs for osteomyelitis  • MRI - this ulceration plantar surface of forefoot at the level of the 3rd MTP joint again seen with surrounding cellulitis, marrow edema within the 3rd metatarsal head and neck and proximal phalanx and 3rd middle phalanx adjacent to the plantar ulcer, no confluent decreased T1 marrow signal seen to definitively suggest osteomyelitis but given marrow edema in the adjacent ulcer, cannot exclude the possibility of early osteomyelitis  • WBC wnl  • Procal pending  • Temp afebrile  • BC drawn - will follow and adjust antibiotics as needed     • Was started on vancomycin and Rocephin in the ED; will switch to Ancef  • Follow CBC and fever curve  • Podiatry will see tomorrow - possible for bone marrow biopsy given inconclusive osteomyelitis on MRI

## 2022-11-29 NOTE — ASSESSMENT & PLAN NOTE
Lab Results   Component Value Date    EGFR 32 11/29/2022    EGFR 37 11/28/2022    EGFR 36 11/08/2022    CREATININE 2 08 (H) 11/29/2022    CREATININE 1 85 (H) 11/28/2022    CREATININE 1 91 (H) 11/08/2022     • Review of care everywhere, patient has CKD with creatinine > 2 since 2020  • Creatinine at baseline on admission- history CKD 4  • Referred to Nephrology by PCP but declined  • Renally dose medications, avoid hypotension, avoid nephrotoxins  • Remains at baseline

## 2022-11-29 NOTE — PLAN OF CARE
Problem: Potential for Falls  Goal: Patient will remain free of falls  Description: INTERVENTIONS:  - Educate patient/family on patient safety including physical limitations  - Instruct patient to call for assistance with activity   - Consult OT/PT to assist with strengthening/mobility   - Keep Call bell within reach  - Keep bed low and locked with side rails adjusted as appropriate  - Keep care items and personal belongings within reach  - Initiate and maintain comfort rounds  - Make Fall Risk Sign visible to staff  - Offer Toileting every 2 Hours, in advance of need  - Initiate/Maintain bed/chair alarm  - Obtain necessary fall risk management equipment: non slip socks  - Apply yellow socks and bracelet for high fall risk patients  - Consider moving patient to room near nurses station  Outcome: Progressing     Problem: PAIN - ADULT  Goal: Verbalizes/displays adequate comfort level or baseline comfort level  Description: Interventions:  - Encourage patient to monitor pain and request assistance  - Assess pain using appropriate pain scale  - Administer analgesics based on type and severity of pain and evaluate response  - Implement non-pharmacological measures as appropriate and evaluate response  - Consider cultural and social influences on pain and pain management  - Notify physician/advanced practitioner if interventions unsuccessful or patient reports new pain  Outcome: Progressing     Problem: INFECTION - ADULT  Goal: Absence or prevention of progression during hospitalization  Description: INTERVENTIONS:  - Assess and monitor for signs and symptoms of infection  - Monitor lab/diagnostic results  - Monitor all insertion sites, i e  indwelling lines, tubes, and drains  - Monitor endotracheal if appropriate and nasal secretions for changes in amount and color  - [place] appropriate cooling/warming therapies per order  - Administer medications as ordered  - Instruct and encourage patient and family to use good hand hygiene technique  - Identify and instruct in appropriate isolation precautions for identified infection/condition  Outcome: Progressing     Problem: SAFETY ADULT  Goal: Patient will remain free of falls  Description: INTERVENTIONS:  - Educate patient/family on patient safety including physical limitations  - Instruct patient to call for assistance with activity   - Consult OT/PT to assist with strengthening/mobility   - Keep Call bell within reach  - Keep bed low and locked with side rails adjusted as appropriate  - Keep care items and personal belongings within reach  - Initiate and maintain comfort rounds  - Make Fall Risk Sign visible to staff  - Offer Toileting every 2 Hours, in advance of need  - Apply yellow socks and bracelet for high fall risk patients  - Consider moving patient to room near nurses station  Outcome: Progressing     Problem: Knowledge Deficit  Goal: Patient/family/caregiver demonstrates understanding of disease process, treatment plan, medications, and discharge instructions  Description: Complete learning assessment and assess knowledge base    Interventions:  - Provide teaching at level of understanding  - Provide teaching via preferred learning methods  Outcome: Progressing     Problem: SKIN/TISSUE INTEGRITY - ADULT  Goal: Skin Integrity remains intact(Skin Breakdown Prevention)  Description: Assess:  -Perform [place] assessment every 12  -Inspect skin when repositioning, toileting, and assisting with ADLS  -Assess extremities for adequate circulation and sensation     Bed Management:  -Have minimal linens on bed & keep smooth, unwrinkled  -Change linens as needed when moist or perspiring        Activity:  -Mobilize patient 3 times a day  -Encourage activity and walks on unit  -Encourage or provide ROM exercises   -Use appropriate equipment to lift or move patient in bed      Skin Care:  -Avoid use of baby powder, tape, friction and shearing, hot water or constrictive clothing  Outcome: Progressing

## 2022-11-29 NOTE — PLAN OF CARE
Problem: SKIN/TISSUE INTEGRITY - ADULT  Goal: Skin Integrity remains intact(Skin Breakdown Prevention)  Description: Assess:  -Perform [place] assessment every   -Clean and moisturize skin every   -Inspect skin when repositioning, toileting, and assisting with ADLS  -Assess under medical devices such as  every   -Assess extremities for adequate circulation and sensation     Bed Management:  -Have minimal linens on bed & keep smooth, unwrinkled  -Change linens as needed when moist or perspiring  -Avoid sitting or lying in one position for more than  hours while in bed  -Keep HOB at degrees     Toileting:  -Offer bedside commode  -Assess for incontinence every   -Use incontinent care products after each incontinent episode such as     Activity:  -Mobilize patient  times a day  -Encourage activity and walks on unit  -Encourage or provide ROM exercises   -Turn and reposition patient every  Hours  -Use appropriate equipment to lift or move patient in bed  -Instruct/ Assist with weight shifting every  when out of bed in chair  -Consider limitation of chair time  hour intervals    Skin Care:  -Avoid use of baby powder, tape, friction and shearing, hot water or constrictive clothing  -Relieve pressure over bony prominences using -Do not massage red bony areas    Next Steps:  -Teach patient strategies to minimize risks such as    -Consider consults to  interdisciplinary teams such as   Outcome: Not Progressing  Goal: Incision(s), wounds(s) or drain site(s) healing without S/S of infection  Description: INTERVENTIONS  - Assess and document dressing, incision, wound bed, drain sites and surrounding tissue  - Provide patient and family education  - Perform skin care/dressing changes every   Outcome: Not Progressing

## 2022-11-29 NOTE — UTILIZATION REVIEW
Initial Clinical Review    Admission: Date/Time/Statement:   Admission Orders (From admission, onward)     Ordered        11/28/22 1642  [address] [place]  Once                      Orders Placed This Encounter   Procedures   • INPATIENT ADMISSION     Standing Status:   Standing     Number of Occurrences:   1     Order Specific Question:   Level of Care     Answer:   Med Surg [16]     Order Specific Question:   Estimated length of stay     Answer:   More than 2 Midnights     Order Specific Question:   Certification     Answer:   I certify that inpatient services are medically necessary for this patient for a duration of greater than two midnights  See H&P and MD Progress Notes for additional information about the patient's course of treatment  ED Arrival Information     Expected   -    Arrival   11/28/2022 12:40    Acuity   Urgent            Means of arrival   Walk-In    Escorted by   Self    Service   Hospitalist    Admission type   Emergency            Arrival complaint   Right foot pain            Chief Complaint   Patient presents with   • Wound Check     Pt reports R foot wound that is being followed by surgery and wound care  Reports increased pain and drainage from wound last night        Initial Presentation: 57 y o  male with PMHx of recent partial R toe amputation d/t osteomyelitis, DM, chronic wounds, HTN, hypothyroidism, CKD IV who presents to ED as walk-in with 1-2 days of increasing redness and swelling to his R foot with increased pain and purulent drainage  Pt follows with podiatrist as op and had an appt in a few wks  On exam R foot with erythema to R 3rd toe and lesion with plantar aspect of right foot, seems to probe deep  WBC nl  Hgb 10 6, Hct 32 2  BUN 27, Cr 1 85, glucose 155  XR R foot neg for osteo  IV abx given in ED  Pt had vascular study done before which was normal  Admit inpatient to M/S unit with Cellulitis of R toe -- suspect osteomyelitis  Continue IV abx   Check MRI, ESR  Continue pta po meds  Fingerstick glucose checks w/ ssi  Date: 11/29   Day 2: pt with minimal pain  MRI - this ulceration plantar surface of forefoot at the level of the 3rd MTP joint again seen with surrounding cellulitis, marrow edema within the 3rd metatarsal head and neck and proximal phalanx and 3rd middle phalanx adjacent to the plantar ulcer, no confluent decreased T1 marrow signal seen to definitively suggest osteomyelitis but given marrow edema in the adjacent ulcer, cannot exclude the possibility of early osteomyelitis  Continue IV abx  Podiatry to see tomorrow --  possible for bone marrow biopsy given inconclusive osteomyelitis on MRI  Continue analgesics prn  Cons carb diet with npo after MN   Fingerstick glucose check w/ ssi    ED Triage Vitals   Temperature Pulse Respirations Blood Pressure SpO2   11/28/22 1259 11/28/22 1259 11/28/22 1259 11/28/22 1259 11/28/22 1259   98 °F (36 7 °C) 67 16 154/89 98 %      Temp Source Heart Rate Source Patient Position - Orthostatic VS BP Location FiO2 (%)   11/28/22 1749 11/28/22 1749 11/28/22 1259 11/28/22 1259 --   Oral Monitor Lying Right arm       Pain Score       11/28/22 1259       6          Wt Readings from Last 1 Encounters:   11/28/22 108 kg (238 lb)     Additional Vital Signs:   Date/Time Temp Pulse Resp BP MAP (mmHg) SpO2 O2 Device Patient Position - Orthostatic VS   11/29/22 14:41:37 98 1 °F (36 7 °C) 60 18 138/80 99 96 % -- --   11/29/22 1100 97 6 °F (36 4 °C) 56 18 134/75 99 98 % None (Room air) Sitting   11/29/22 0700 97 8 °F (36 6 °C) 60 18 145/74 -- 99 % None (Room air) Sitting   11/29/22 0559 -- -- -- -- -- -- None (Room air) --   11/29/22 0002 97 7 °F (36 5 °C) 64 18 130/70 93 95 % -- Lying   11/28/22 1749 98 °F (36 7 °C) 59 -- 153/78 -- 97 % None (Room air) Lying   11/28/22 1259 98 °F (36 7 °C) 67 16 154/89 -- 98 % None (Room air) Lying     Pertinent Labs/Diagnostic Test Results:   MRI foot/forefoot toes right wo contrast   Final Result by Raul Collado MD (11/29 1216)      Status post amputation of the 2nd toe distal and middle phalanges  Ulceration plantar surface of the forefoot at the level of the 3rd MTP joint again seen with surrounding cellulitis  There is marrow edema within the 3rd metatarsal head and neck and 3rd proximal phalanx and 3rd middle phalanx adjacent to the plantar ulcer  There is however no confluent decreased T1 marrow signal seen to definitively suggest osteomyelitis but given    the marrow edema and the adjacent ulcer, cannot exclude the possibility of early osteomyelitis  XR foot 3+ views RIGHT   ED Interpretation by Haydee Bills MD (11/28 1514)   No osteo      Final Result by Jada Wilde MD (11/28 1522)      No acute osseous abnormality  No radiographic findings of acute osteomyelitis              Results from last 7 days   Lab Units 11/29/22  0553 11/28/22  1613   WBC Thousand/uL 7 73 7 94   HEMOGLOBIN g/dL 11 1* 10 6*   HEMATOCRIT % 34 4* 32 2*   PLATELETS Thousands/uL 178 181   NEUTROS ABS Thousands/µL 3 30 4 02     Results from last 7 days   Lab Units 11/29/22  0553 11/28/22  1519   SODIUM mmol/L 140 136   POTASSIUM mmol/L 4 8 5 0   CHLORIDE mmol/L 104 102   CO2 mmol/L 28 23   ANION GAP mmol/L 8 11   BUN mg/dL 26* 27*   CREATININE mg/dL 2 08* 1 85*   EGFR ml/min/1 73sq m 32 37   CALCIUM mg/dL 9 2 8 8     Results from last 7 days   Lab Units 11/28/22  1519   AST U/L 41   ALT U/L 27   ALK PHOS U/L 73   TOTAL PROTEIN g/dL 8 0   ALBUMIN g/dL 3 7   TOTAL BILIRUBIN mg/dL 0 44     Results from last 7 days   Lab Units 11/29/22  1047 11/29/22  0754 11/28/22  2250   POC GLUCOSE mg/dl 159* 121 151*     Results from last 7 days   Lab Units 11/29/22  0553 11/28/22  1519   GLUCOSE RANDOM mg/dL 113 155*     Results from last 7 days   Lab Units 11/28/22  1519   PROTIME seconds 13 7   INR  1 04   PTT seconds 28     Results from last 7 days   Lab Units 11/28/22  1519   PROCALCITONIN ng/ml 0 10 Results from last 7 days   Lab Units 11/28/22  1519   LACTIC ACID mmol/L 1 3     Results from last 7 days   Lab Units 11/28/22  1519   LIPASE u/L 111     Results from last 7 days   Lab Units 11/28/22  1620 11/28/22  1519   CRP mg/L  --  15 9*   SED RATE mm/hour 62*  --        Results from last 7 days   Lab Units 11/28/22  1519   BLOOD CULTURE  Received in Microbiology Lab  Culture in Progress  Received in Microbiology Lab  Culture in Progress       ED Treatment:   Medication Administration from 11/28/2022 1240 to 11/28/2022 2114       Date/Time Order Dose Route Action     11/28/2022 1538 EST cefTRIAXone (ROCEPHIN) IVPB (premix in dextrose) 1,000 mg 50 mL 1,000 mg Intravenous New Bag     11/28/2022 1614 EST vancomycin (VANCOCIN) 1500 mg in sodium chloride 0 9% 250 mL IVPB 1,500 mg Intravenous New Bag     Past Medical History:   Diagnosis Date   • Chronic kidney failure, stage 2 (mild)    • Diabetes mellitus (HCC)    • GERD (gastroesophageal reflux disease)    • Gout    • Hypertension      Present on Admission:  • Acquired hypothyroidism  • Stage 4 chronic kidney disease ([facility] [address] )  • Essential hypertension  • Chronic anemia  • Type 2 diabetes mellitus with circulatory disorder, without long-term current use of insulin ([facility])      Admitting Diagnosis: Foot pain [M79 673]  Cellulitis of right lower extremity [L03 115]  Cellulitis of right toe [L03 031]  Age/Sex: 57 y o  male  Admission Orders:  Scheduled Medications:  allopurinol, 100 mg, Oral, Daily  atorvastatin, 20 mg, Oral, Daily With Dinner  cefazolin, 2,000 mg, Intravenous, Q8H  ferrous sulfate, 325 mg, Oral, Daily With Breakfast  fluticasone, 1 puff, Inhalation, Q12H TRAVIS  heparin (porcine), 5,000 Units, Subcutaneous, Q8H TRAVIS  insulin lispro, 2-12 Units, Subcutaneous, TID AC  insulin lispro, 2-12 Units, Subcutaneous, HS  levothyroxine, 150 mcg, Oral, Early Morning  losartan, 25 mg, Oral, Daily  metoprolol succinate, 100 mg, Oral, Daily  pantoprazole, 40 mg, Oral, Early Morning    PRN Meds:  acetaminophen, 650 mg, Oral, Q6H PRN  albuterol, 2 puff, Inhalation, Q4H PRN  ondansetron, 4 mg, Intravenous, Q6H PRN  oxyCODONE, 5 mg, Oral, Q6H PRN 11/29 x2        Network Utilization Review Department  ATTENTION: Please call with any questions or concerns to [phone] and carefully listen to the prompts so that you are directed to the right person  All voicemails are confidential   Aggie Naylor all requests for admission clinical reviews, approved or denied determinations and any other requests to dedicated fax number below belonging to the campus where the patient is receiving treatment   List of dedicated fax numbers for the Facilities:  [address] DENIALS (Administrative/Medical Necessity) [phone]   [address] (Maternity/NICU/Pediatrics) [phone]   [address] [phone]   [facility] 77 [phone]   [address] [address] [address] [place] [place] [address] [facility] [phone]   [address] [place] [facility] 815 [facility] [phone]

## 2022-11-29 NOTE — PROGRESS NOTES
[address]  Progress Note - Ginger Parkinson 1960, 57 y o  male MRN: [id number]  Unit/Bed#: MS [facility]ry-Guill[facility] Encounter: [id number]  Primary Care Provider: Roxana Allison MD   Date and time admitted to hospital: 11/28/2022  2:34 PM    * Cellulitis of right toe  Assessment & Plan  Presented to ED with right foot wound with increasing redness and swelling to the foot with increased pain and now drainage of pus  • Outpatient follows with Podiatry - podiatry recommending admission for MRI due to concern for osteomyelitis    • Imaging- x-ray of right foot without signs for osteomyelitis  • MRI - this ulceration plantar surface of forefoot at the level of the 3rd MTP joint again seen with surrounding cellulitis, marrow edema within the 3rd metatarsal head and neck and proximal phalanx and 3rd middle phalanx adjacent to the plantar ulcer, no confluent decreased T1 marrow signal seen to definitively suggest osteomyelitis but given marrow edema in the adjacent ulcer, cannot exclude the possibility of early osteomyelitis  • WBC wnl  • Procal pending  • Temp afebrile  • BC drawn - will follow and adjust antibiotics as needed     • Was started on vancomycin and Rocephin in the ED; will switch to Ancef  • Follow CBC and fever curve  • Podiatry will see tomorrow - possible for bone marrow biopsy given inconclusive osteomyelitis on MRI      Type 2 diabetes mellitus with circulatory disorder, without long-term current use of insulin [facility])  Assessment & Plan  Lab Results   Component Value Date    HGBA1C 6 5 (H) 08/17/2022       Recent Labs     11/28/22  2250 11/29/22  0754 11/29/22  1047   POCGLU 151* 121 159*       Blood Sugar Average: Last 72 hrs:  • (P) 317 [id number] Glipizide on hold with CKD 4  •  sliding scale insulin coverage with Accu-Cheks  • Carbohydrate controlled diet  • Hypoglycemia protocol      Chronic anemia  Assessment & Plan  • Chronic anemia  • Hemoglobin baseline appears to be around 9-10 recently - Probable secondary to CKD 4  • Monitor closely    Essential hypertension  Assessment & Plan  • Continue PTA metoprolol succinate 100 mg daily and losartan 25 mg daily   • Trend blood pressures  • Controlled on current regimen, avoid hypotension    Stage 4 chronic kidney disease [facility])  Assessment & Plan  Lab Results   Component Value Date    EGFR 32 2022    EGFR 37 2022    EGFR 36 2022    CREATININE 2 08 (H) 2022    CREATININE 1 85 (H) 2022    CREATININE 1 91 (H) 2022     • Review of care everywhere, patient has CKD with creatinine > 2 since   • Creatinine at baseline on admission- history CKD 4  • Referred to Nephrology by PCP but declined  • Renally dose medications, avoid hypotension, avoid nephrotoxins  • Remains at baseline    Acquired hypothyroidism  Assessment & Plan  Continue levothyroxine      VTE Pharmacologic Prophylaxis: VTE Score: 4 Moderate Risk (Score 3-4) - Pharmacological DVT Prophylaxis Ordered: heparin  Patient Centered Rounds: I performed bedside rounds with nursing staff today  Discussions with Specialists or Other Care Team Provider: CM    Education and Discussions with Family / Patient: Patient declined call to   Time Spent for Care: 30 minutes  More than 50% of total time spent on counseling and coordination of care as described above  Current Length of Stay: 1 day(s)  Current Patient Status: Inpatient   Certification Statement: The patient will continue to require additional inpatient hospital stay due to cellulitis, IV abx, eval by podiatry and potential bone biopsy   Discharge Plan: Anticipate discharge in 48-72 hrs to home  Code Status: Level 1 - Full Code    Subjective:   Seen and examined   Feels okay today, pain is minimal     Objective:     Vitals:   Temp (24hrs), Av 8 °F (36 6 °C), Min:97 6 °F (36 4 °C), Max:98 1 °F (36 7 °C)    Temp:  [97 6 °F (36 4 °C)-98 1 °F (36 7 °C)] 98 1 °F (36 7 °C)  HR: [56-64] 60  Resp:  [18] 18  BP: (130-153)/(70-80) 138/80  SpO2:  [95 %-99 %] 96 %  Body mass index is 34 15 kg/m²  Input and Output Summary (last 24 hours): Intake/Output Summary (Last 24 hours) at 11/29/2022 1516  Last data filed at 11/29/2022 0915  Gross per 24 hour   Intake 690 ml   Output 1200 ml   Net -510 ml       Physical Exam:   Physical Exam  Vitals and nursing note reviewed  Constitutional:       General: He is not in acute distress  Appearance: Normal appearance  HENT:      Head: Normocephalic and atraumatic  Nose: No congestion  Mouth/Throat:      Mouth: Mucous membranes are moist    Eyes:      Conjunctiva/sclera: Conjunctivae normal    Cardiovascular:      Rate and Rhythm: Normal rate and regular rhythm  Pulses: Normal pulses  Heart sounds: Normal heart sounds  No murmur heard  Pulmonary:      Effort: Pulmonary effort is normal  No respiratory distress  Breath sounds: Normal breath sounds  Abdominal:      General: Bowel sounds are normal       Palpations: Abdomen is soft  Tenderness: There is no abdominal tenderness  Musculoskeletal:         General: Normal range of motion  Right lower leg: No edema  Left lower leg: No edema  Skin:     General: Skin is warm and dry  Findings: Erythema (lessened erythema ) present  Neurological:      Mental Status: He is alert and oriented to person, place, and time            Additional Data:     Labs:  Results from last 7 days   Lab Units 11/29/22  0553   WBC Thousand/uL 7 73   HEMOGLOBIN g/dL 11 1*   HEMATOCRIT % 34 4*   PLATELETS Thousands/uL 178   NEUTROS PCT % 43   LYMPHS PCT % 40   MONOS PCT % 11   EOS PCT % 6     Results from last 7 days   Lab Units 11/29/22  0553 11/28/22  1519   SODIUM mmol/L 140 136   POTASSIUM mmol/L 4 8 5 0   CHLORIDE mmol/L 104 102   CO2 mmol/L 28 23   BUN mg/dL 26* 27*   CREATININE mg/dL 2 08* 1 85*   ANION GAP mmol/L 8 11   CALCIUM mg/dL 9 2 8 8   ALBUMIN g/dL  --  3 7 TOTAL BILIRUBIN mg/dL  --  0 44   ALK PHOS U/L  --  73   ALT U/L  --  27   AST U/L  --  41   GLUCOSE RANDOM mg/dL 113 155*     Results from last 7 days   Lab Units 11/28/22  1519   INR  1 04     Results from last 7 days   Lab Units 11/29/22  1047 11/29/22  0754 11/28/22  2250   POC GLUCOSE mg/dl 159* 121 151*         Results from last 7 days   Lab Units 11/28/22  1519   LACTIC ACID mmol/L 1 3   PROCALCITONIN ng/ml 0 10       Lines/Drains:  Invasive Devices     Peripheral Intravenous Line  Duration           Peripheral IV 11/28/22 Right;Ventral (anterior) Hand <1 day                      Imaging: Reviewed radiology reports from this admission including: MRI    Recent Cultures (last 7 days):   Results from last 7 days   Lab Units 11/28/22  1519   BLOOD CULTURE  Received in Microbiology Lab  Culture in Progress  Received in Microbiology Lab  Culture in Progress         Last 24 Hours Medication List:   Current Facility-Administered Medications   Medication Dose Route Frequency Provider Last Rate   • acetaminophen  650 mg Oral Q6H PRN Sherryle Pillow, PA-C     • albuterol  2 puff Inhalation Q4H PRN Sherryle Pillow, PA-C     • allopurinol  100 mg Oral Daily Sherryle Pillow, PA-C     • atorvastatin  20 mg Oral Daily With Rusty Renteria PA-C     • cefazolin  2,000 mg Intravenous Q8H Sherryle Pillow, PA-C 2,000 mg (11/29/22 1426)   • ferrous sulfate  325 mg Oral Daily With Breakfast Sherryle Pillow, PA-C     • fluticasone  1 puff Inhalation Q12H [address] Sherryle Pillow, PA-C     • heparin (porcine)  5,000 Units Subcutaneous [facility] Sherryle Pillow, PA-C     • insulin lispro  2-12 Units Subcutaneous TID AC Sindhu Berrios MD     • insulin lispro  2-12 Units Subcutaneous HS Sonia Rodriguez MD     • levothyroxine  150 mcg Oral Early Morning Sherryle Pillow, PA-C     • losartan  25 mg Oral Daily Sherryle Pillow, PA-C     • metoprolol succinate  100 mg Oral Daily Sherryle Pillow, PA-C     • ondansetron  4 mg Intravenous Q6H PRN [facility] Zakia Sanon PA-C     • oxyCODONE  5 mg Oral Q6H PRN DESTINEE Elizondo     • pantoprazole  40 mg Oral Early Morning Rian Carlos PA-C          Today, Patient Was Seen By: Rian Carlos PA-C    **Please Note: This note may have been constructed using a voice recognition system  **

## 2022-11-30 ENCOUNTER — APPOINTMENT (INPATIENT)
Dept: RADIOLOGY | Facility: HOSPITAL | Age: 62
End: 2022-11-30

## 2022-11-30 ENCOUNTER — ANESTHESIA (INPATIENT)
Dept: PERIOP | Facility: HOSPITAL | Age: 62
End: 2022-11-30

## 2022-11-30 LAB
ANION GAP SERPL CALCULATED.3IONS-SCNC: 10 MMOL/L (ref 4–13)
BASOPHILS # BLD AUTO: 0.05 THOUSANDS/ÂΜL (ref 0–0.1)
BASOPHILS NFR BLD AUTO: 1 % (ref 0–1)
BUN SERPL-MCNC: 25 MG/DL (ref 5–25)
CALCIUM SERPL-MCNC: 9.4 MG/DL (ref 8.3–10.1)
CHLORIDE SERPL-SCNC: 102 MMOL/L (ref 96–108)
CO2 SERPL-SCNC: 26 MMOL/L (ref 21–32)
CREAT SERPL-MCNC: 2.04 MG/DL (ref 0.6–1.3)
EOSINOPHIL # BLD AUTO: 0.46 THOUSAND/ÂΜL (ref 0–0.61)
EOSINOPHIL NFR BLD AUTO: 6 % (ref 0–6)
ERYTHROCYTE [DISTWIDTH] IN BLOOD BY AUTOMATED COUNT: 13 % (ref 11.6–15.1)
GFR SERPL CREATININE-BSD FRML MDRD: 33 ML/MIN/1.73SQ M
GLUCOSE SERPL-MCNC: 108 MG/DL (ref 65–140)
GLUCOSE SERPL-MCNC: 116 MG/DL (ref 65–140)
GLUCOSE SERPL-MCNC: 122 MG/DL (ref 65–140)
GLUCOSE SERPL-MCNC: 122 MG/DL (ref 65–140)
GLUCOSE SERPL-MCNC: 139 MG/DL (ref 65–140)
GLUCOSE SERPL-MCNC: 140 MG/DL (ref 65–140)
HCT VFR BLD AUTO: 34.1 % (ref 36.5–49.3)
HGB BLD-MCNC: 11.3 G/DL (ref 12–17)
IMM GRANULOCYTES # BLD AUTO: 0.03 THOUSAND/UL (ref 0–0.2)
IMM GRANULOCYTES NFR BLD AUTO: 0 % (ref 0–2)
LYMPHOCYTES # BLD AUTO: 3.81 THOUSANDS/ÂΜL (ref 0.6–4.47)
LYMPHOCYTES NFR BLD AUTO: 46 % (ref 14–44)
MCH RBC QN AUTO: 34.5 PG (ref 26.8–34.3)
MCHC RBC AUTO-ENTMCNC: 33.1 G/DL (ref 31.4–37.4)
MCV RBC AUTO: 104 FL (ref 82–98)
MONOCYTES # BLD AUTO: 0.73 THOUSAND/ÂΜL (ref 0.17–1.22)
MONOCYTES NFR BLD AUTO: 9 % (ref 4–12)
NEUTROPHILS # BLD AUTO: 3.11 THOUSANDS/ÂΜL (ref 1.85–7.62)
NEUTS SEG NFR BLD AUTO: 38 % (ref 43–75)
NRBC BLD AUTO-RTO: 0 /100 WBCS
PLATELET # BLD AUTO: 191 THOUSANDS/UL (ref 149–390)
PMV BLD AUTO: 9.7 FL (ref 8.9–12.7)
POTASSIUM SERPL-SCNC: 4.4 MMOL/L (ref 3.5–5.3)
RBC # BLD AUTO: 3.28 MILLION/UL (ref 3.88–5.62)
SODIUM SERPL-SCNC: 138 MMOL/L (ref 135–147)
WBC # BLD AUTO: 8.19 THOUSAND/UL (ref 4.31–10.16)

## 2022-11-30 PROCEDURE — 0QBN0ZX EXCISION OF RIGHT METATARSAL, OPEN APPROACH, DIAGNOSTIC: ICD-10-PCS | Performed by: STUDENT IN AN ORGANIZED HEALTH CARE EDUCATION/TRAINING PROGRAM

## 2022-11-30 RX ORDER — PROPOFOL 10 MG/ML
INJECTION, EMULSION INTRAVENOUS AS NEEDED
Status: DISCONTINUED | OUTPATIENT
Start: 2022-11-30 | End: 2022-11-30

## 2022-11-30 RX ORDER — PROPOFOL 10 MG/ML
INJECTION, EMULSION INTRAVENOUS CONTINUOUS PRN
Status: DISCONTINUED | OUTPATIENT
Start: 2022-11-30 | End: 2022-11-30

## 2022-11-30 RX ORDER — LIDOCAINE HYDROCHLORIDE 10 MG/ML
INJECTION, SOLUTION EPIDURAL; INFILTRATION; INTRACAUDAL; PERINEURAL AS NEEDED
Status: DISCONTINUED | OUTPATIENT
Start: 2022-11-30 | End: 2022-11-30 | Stop reason: HOSPADM

## 2022-11-30 RX ORDER — FENTANYL CITRATE 50 UG/ML
INJECTION, SOLUTION INTRAMUSCULAR; INTRAVENOUS AS NEEDED
Status: DISCONTINUED | OUTPATIENT
Start: 2022-11-30 | End: 2022-11-30

## 2022-11-30 RX ORDER — FENTANYL CITRATE/PF 50 MCG/ML
25 SYRINGE (ML) INJECTION
Status: DISCONTINUED | OUTPATIENT
Start: 2022-11-30 | End: 2022-11-30 | Stop reason: HOSPADM

## 2022-11-30 RX ORDER — POLYETHYLENE GLYCOL 3350 17 G/17G
17 POWDER, FOR SOLUTION ORAL DAILY
Status: DISCONTINUED | OUTPATIENT
Start: 2022-11-30 | End: 2022-12-02 | Stop reason: HOSPADM

## 2022-11-30 RX ORDER — DOCUSATE SODIUM 100 MG/1
100 CAPSULE, LIQUID FILLED ORAL 2 TIMES DAILY
Status: DISCONTINUED | OUTPATIENT
Start: 2022-11-30 | End: 2022-12-02 | Stop reason: HOSPADM

## 2022-11-30 RX ORDER — BUPIVACAINE HYDROCHLORIDE 2.5 MG/ML
INJECTION, SOLUTION EPIDURAL; INFILTRATION; INTRACAUDAL AS NEEDED
Status: DISCONTINUED | OUTPATIENT
Start: 2022-11-30 | End: 2022-11-30 | Stop reason: HOSPADM

## 2022-11-30 RX ORDER — METOCLOPRAMIDE HYDROCHLORIDE 5 MG/ML
10 INJECTION INTRAMUSCULAR; INTRAVENOUS ONCE AS NEEDED
Status: DISCONTINUED | OUTPATIENT
Start: 2022-11-30 | End: 2022-11-30 | Stop reason: HOSPADM

## 2022-11-30 RX ORDER — SODIUM CHLORIDE, SODIUM LACTATE, POTASSIUM CHLORIDE, CALCIUM CHLORIDE 600; 310; 30; 20 MG/100ML; MG/100ML; MG/100ML; MG/100ML
INJECTION, SOLUTION INTRAVENOUS CONTINUOUS PRN
Status: DISCONTINUED | OUTPATIENT
Start: 2022-11-30 | End: 2022-11-30

## 2022-11-30 RX ORDER — MIDAZOLAM HYDROCHLORIDE 2 MG/2ML
INJECTION, SOLUTION INTRAMUSCULAR; INTRAVENOUS AS NEEDED
Status: DISCONTINUED | OUTPATIENT
Start: 2022-11-30 | End: 2022-11-30

## 2022-11-30 RX ADMIN — OXYCODONE HYDROCHLORIDE 5 MG: 5 TABLET ORAL at 20:56

## 2022-11-30 RX ADMIN — FENTANYL CITRATE 50 MCG: 50 INJECTION, SOLUTION INTRAMUSCULAR; INTRAVENOUS at 12:56

## 2022-11-30 RX ADMIN — DOCUSATE SODIUM 100 MG: 100 CAPSULE ORAL at 17:19

## 2022-11-30 RX ADMIN — LOSARTAN POTASSIUM 25 MG: 25 TABLET, FILM COATED ORAL at 09:09

## 2022-11-30 RX ADMIN — OXYCODONE HYDROCHLORIDE 5 MG: 5 TABLET ORAL at 15:15

## 2022-11-30 RX ADMIN — PANTOPRAZOLE SODIUM 40 MG: 40 TABLET, DELAYED RELEASE ORAL at 05:12

## 2022-11-30 RX ADMIN — LEVOTHYROXINE SODIUM 150 MCG: 150 TABLET ORAL at 05:12

## 2022-11-30 RX ADMIN — HEPARIN SODIUM 5000 UNITS: 5000 INJECTION INTRAVENOUS; SUBCUTANEOUS at 14:22

## 2022-11-30 RX ADMIN — FENTANYL CITRATE 50 MCG: 50 INJECTION, SOLUTION INTRAMUSCULAR; INTRAVENOUS at 13:00

## 2022-11-30 RX ADMIN — PROPOFOL 50 MCG/KG/MIN: 10 INJECTION, EMULSION INTRAVENOUS at 13:04

## 2022-11-30 RX ADMIN — SODIUM CHLORIDE, SODIUM LACTATE, POTASSIUM CHLORIDE, AND CALCIUM CHLORIDE: .6; .31; .03; .02 INJECTION, SOLUTION INTRAVENOUS at 12:51

## 2022-11-30 RX ADMIN — HEPARIN SODIUM 5000 UNITS: 5000 INJECTION INTRAVENOUS; SUBCUTANEOUS at 21:39

## 2022-11-30 RX ADMIN — POLYETHYLENE GLYCOL 3350 17 G: 17 POWDER, FOR SOLUTION ORAL at 14:22

## 2022-11-30 RX ADMIN — PROPOFOL 20 MG: 10 INJECTION, EMULSION INTRAVENOUS at 13:01

## 2022-11-30 RX ADMIN — CEFAZOLIN SODIUM 2000 MG: 2 SOLUTION INTRAVENOUS at 20:56

## 2022-11-30 RX ADMIN — MIDAZOLAM HYDROCHLORIDE 2 MG: 1 INJECTION, SOLUTION INTRAMUSCULAR; INTRAVENOUS at 12:51

## 2022-11-30 RX ADMIN — CEFAZOLIN SODIUM 2000 MG: 2 SOLUTION INTRAVENOUS at 05:12

## 2022-11-30 RX ADMIN — CEFAZOLIN SODIUM 2000 MG: 2 SOLUTION INTRAVENOUS at 12:59

## 2022-11-30 RX ADMIN — ALLOPURINOL 100 MG: 100 TABLET ORAL at 09:09

## 2022-11-30 RX ADMIN — ATORVASTATIN CALCIUM 20 MG: 20 TABLET, FILM COATED ORAL at 17:19

## 2022-11-30 RX ADMIN — METOPROLOL SUCCINATE 100 MG: 100 TABLET, EXTENDED RELEASE ORAL at 09:09

## 2022-11-30 NOTE — PROGRESS NOTES
[address]  Progress Note - Sheila  1960, 57 y o  male MRN: [id number]  Unit/Bed#: OPAL PRIETO Encounter: [id number]  Primary Care Provider: Raul Munoz MD   Date and time admitted to hospital: 11/28/2022  2:34 PM    Type 2 diabetes mellitus with circulatory disorder, without long-term current use of insulin [facility])  Assessment & Plan  Lab Results   Component Value Date    HGBA1C 6 5 (H) 08/17/2022       Recent Labs     11/29/22  1635 11/29/22  2107 11/30/22  0642 11/30/22  1056   POCGLU 87 164* 122 108       Blood Sugar Average: Last 72 hrs:  • (P) 130 [id number] Glipizide on hold with CKD 4  •  sliding scale insulin coverage with Accu-Cheks  • Carbohydrate controlled diet  • Hypoglycemia protocol  • controlled      Chronic anemia  Assessment & Plan  • Chronic anemia  • Hemoglobin baseline appears to be around 9-10 recently - Probable secondary to CKD 4  • Monitor closely    Essential hypertension  Assessment & Plan  • Continue PTA metoprolol succinate 100 mg daily and losartan 25 mg daily   • Trend blood pressures  • Controlled on current regimen, avoid hypotension    Stage 4 chronic kidney disease ([facility] [address] )  Assessment & Plan  Lab Results   Component Value Date    EGFR 33 11/30/2022    EGFR 32 11/29/2022    EGFR 37 11/28/2022    CREATININE 2 04 (H) 11/30/2022    CREATININE 2 08 (H) 11/29/2022    CREATININE 1 85 (H) 11/28/2022     • Review of care everywhere, patient has CKD with creatinine > 2 since 2020  • Creatinine at baseline on admission- history CKD 4  • Referred to Nephrology by PCP but declined  • Renally dose medications, avoid hypotension, avoid nephrotoxins  • Remains at baseline    Acquired hypothyroidism  Assessment & Plan  · Continue levothyroxine    * Cellulitis of right toe  Assessment & Plan  Presented to ED with right foot wound with increasing redness and swelling to the foot with increased pain and now drainage of pus  • Outpatient follows with Podiatry - podiatry recommending admission for MRI due to concern for osteomyelitis    • Imaging- x-ray of right foot without signs for osteomyelitis  • MRI - this ulceration plantar surface of forefoot at the level of the 3rd MTP joint again seen with surrounding cellulitis, marrow edema within the 3rd metatarsal head and neck and proximal phalanx and 3rd middle phalanx adjacent to the plantar ulcer, no confluent decreased T1 marrow signal seen to definitively suggest osteomyelitis but given marrow edema in the adjacent ulcer, cannot exclude the possibility of early osteomyelitis  • WBC wnl  • Procal pending  • Temp afebrile  • BC : no growth 24hr    • Was started on vancomycin and Rocephin in the ED; will switch to Ancef  • Follow CBC and fever curve  • Podiatry consulted -bone marrow biopsy  with podiatry          VTE Pharmacologic Prophylaxis: VTE Score: 4 Moderate Risk (Score 3-4) - Pharmacological DVT Prophylaxis Ordered: heparin  Patient Centered Rounds: I performed bedside rounds with nursing staff today  Discussions with Specialists or Other Care Team Provider: podiatry    Education and Discussions with Family / Patient: Patient declined call to   Time Spent for Care: 30 minutes  More than 50% of total time spent on counseling and coordination of care as described above  Current Length of Stay: 2 day(s)  Current Patient Status: Inpatient   Certification Statement: The patient will continue to require additional inpatient hospital stay due to OR with podiatry  Discharge Plan: Anticipate discharge in 24-48 hrs to home  Code Status: Level 1 - Full Code    Subjective:     Plan for OR this afternoon with podiatry, will resume diet after  Denies issues overnight, mild nausea this morning, requesting bowel regime after OR with constipation     Objective:     Vitals:   Temp (24hrs), Av 9 °F (36 6 °C), Min:97 5 °F (36 4 °C), Max:98 1 °F (36 7 °C)    Temp:  [97 5 °F (36 4 °C)-98 1 °F (36 7 °C)] 98 °F (36 7 °C)  HR:  [49-69] 56  Resp:  [18-20] 20  BP: (138-155)/(75-80) 155/75  SpO2:  [95 %-98 %] 96 %  Body mass index is 34 15 kg/m²  Input and Output Summary (last 24 hours): Intake/Output Summary (Last 24 hours) at 11/30/2022 1329  Last data filed at 11/30/2022 1326  Gross per 24 hour   Intake 460 ml   Output 1000 ml   Net -540 ml       Physical Exam:   Physical Exam  Vitals and nursing note reviewed  Constitutional:       General: He is not in acute distress  Appearance: He is well-developed  He is obese  HENT:      Head: Normocephalic and atraumatic  Mouth/Throat:      Mouth: Mucous membranes are dry  Eyes:      Conjunctiva/sclera: Conjunctivae normal       Pupils: Pupils are equal, round, and reactive to light  Cardiovascular:      Rate and Rhythm: Normal rate and regular rhythm  Heart sounds: No murmur heard  Pulmonary:      Effort: Pulmonary effort is normal  No respiratory distress  Breath sounds: Normal breath sounds  Abdominal:      General: Bowel sounds are normal  There is no distension  Palpations: Abdomen is soft  Tenderness: There is no abdominal tenderness  Musculoskeletal:         General: Swelling (right foot) present  Cervical back: Neck supple  Feet:    Skin:     General: Skin is warm and dry  Capillary Refill: Capillary refill takes less than 2 seconds  Findings: Erythema (right foot) present  Neurological:      General: No focal deficit present  Mental Status: He is alert     Psychiatric:         Mood and Affect: Mood normal         Additional Data:     Labs:  Results from last 7 days   Lab Units 11/30/22  0522   WBC Thousand/uL 8 19   HEMOGLOBIN g/dL 11 3*   HEMATOCRIT % 34 1*   PLATELETS Thousands/uL 191   NEUTROS PCT % 38*   LYMPHS PCT % 46*   MONOS PCT % 9   EOS PCT % 6     Results from last 7 days   Lab Units 11/30/22  0522 11/29/22  0553 11/28/22  1519   SODIUM mmol/L 138   < > 136   POTASSIUM mmol/L 4 4   < > 5 0   CHLORIDE mmol/L 102   < > 102   CO2 mmol/L 26   < > 23   BUN mg/dL 25   < > 27*   CREATININE mg/dL 2 04*   < > 1 85*   ANION GAP mmol/L 10   < > 11   CALCIUM mg/dL 9 4   < > 8 8   ALBUMIN g/dL  --   --  3 7   TOTAL BILIRUBIN mg/dL  --   --  0 44   ALK PHOS U/L  --   --  73   ALT U/L  --   --  27   AST U/L  --   --  41   GLUCOSE RANDOM mg/dL 116   < > 155*    < > = values in this interval not displayed  Results from last 7 days   Lab Units 11/28/22  1519   INR  1 04     Results from last 7 days   Lab Units 11/30/22  1141 11/30/22  1056 11/30/22  0642 11/29/22  2107 11/29/22  1635 11/29/22  1047 11/29/22  0754 11/28/22  2250   POC GLUCOSE mg/dl 122 108 122 164* 87 159* 121 151*         Results from last 7 days   Lab Units 11/28/22  1519   LACTIC ACID mmol/L 1 3   PROCALCITONIN ng/ml 0 10       Lines/Drains:  Invasive Devices     Peripheral Intravenous Line  Duration           Peripheral IV 11/28/22 Right;Ventral (anterior) Hand 1 day                      Imaging: Reviewed radiology reports from this admission including: MRI right foot, xray right foot    Recent Cultures (last 7 days):   Results from last 7 days   Lab Units 11/28/22  1519   BLOOD CULTURE  No Growth at 24 hrs  No Growth at 24 hrs         Last 24 Hours Medication List:   Current Facility-Administered Medications   Medication Dose Route Frequency Provider Last Rate   • acetaminophen  650 mg Oral Q6H PRN Linda Ulloa DPM     • albuterol  2 puff Inhalation Q4H PRN Linda Ulloa DPM     • allopurinol  100 mg Oral Daily Linda Ulloa DPM     • atorvastatin  20 mg Oral Daily With Dinner Linda Ulloa DPM     • bupivacaine (PF)    PRN Linda Ulloa DPM     • cefazolin  2,000 mg Intravenous Q8H Linda Ulloa DPM 2,000 mg (11/30/22 1952)   • ferrous sulfate  325 mg Oral Daily With Breakfast Linda Ulloa DPM     • fluticasone  1 puff Inhalation Q12H [address] Linda Ulloa DPM     • heparin (porcine)  5,000 Units Subcutaneous [facility] Margie Garcia Gui, DPM     • insulin lispro  2-12 Units Subcutaneous TID AC Firman Bumps, DPM     • insulin lispro  2-12 Units Subcutaneous HS Firman Bumps, DPM     • levothyroxine  150 mcg Oral Early Morning Firman Bumps, DPM     • lidocaine (PF)    PRN Firman Bumps, DPM     • losartan  25 mg Oral Daily Firman Bumps, DPM     • metoprolol succinate  100 mg Oral Daily Firman Bumps, DPM     • ondansetron  4 mg Intravenous Q6H PRN Firman Bumps, DPM     • oxyCODONE  5 mg Oral Q6H PRN Firman Bumps, DPM     • pantoprazole  40 mg Oral Early Morning Firman Bumps, DPM          Today, Patient Was Seen By: DESTINEE Mcneil    **Please Note: This note may have been constructed using a voice recognition system  **

## 2022-11-30 NOTE — CASE MANAGEMENT
Case Management Assessment & Discharge Planning Note    Patient name Miranda De Leon  Location OW OR POOL/OR POOL MRN [id number]  : 1960 Date 2022       Current Admission Date: 2022  Current Admission Diagnosis:Cellulitis of right toe   Patient Active Problem List    Diagnosis Date Noted   • Cellulitis of right toe 2022   • GERD (gastroesophageal reflux disease)    • Class 2 obesity in adult    • Osteomyelitis of foot, right, acute ([facility] [address] ) 10/30/2022   • Sepsis ([facility] ) 10/30/2022   • Acquired hypothyroidism 10/30/2022   • Stage 4 chronic kidney disease ([facility] ) 10/30/2022   • Essential hypertension 10/30/2022   • Chronic anemia 10/30/2022   • Type 2 diabetes mellitus with circulatory disorder, without long-term current use of insulin ([place] [address] ) 10/30/2022      LOS (days): 2  Geometric Mean LOS (GMLOS) (days): 3 20  Days to GMLOS:1 3     OBJECTIVE:  PATIENT READMITTED TO HOSPITAL  Risk of Unplanned Readmission Score: 16 19         Current admission status: Inpatient       Preferred Pharmacy:   [address] [place] [address]  Phone: [phone] Fax: [phone]    Primary Care Provider: Andreina Rosado MD    Primary Insurance: [address] [place] [address]  Secondary Insurance:     ASSESSMENT:  Jennifer 26 Proxies    There are no active Health Care Proxies on file         Advance Directives  Does patient have a [address]?: No  Was patient offered paperwork?: Yes (partner declined)  Does patient currently have a Health Care decision maker?: Yes, please see Health Care Proxy section  Does patient have Advance Directives?: No  Was patient offered paperwork?: Yes (partner declined)  Primary Contact: ROSITA Garrett         Readmission Root Cause  30 Day Readmission: No    Patient Information  Admitted from[de-identified] Home  Mental Status: Other (Comment)  During Assessment patient was accompanied by: Not accompanied during assessment  Assessment information provided by[de-identified] Other - please comment (Significant Other)  Primary Caregiver: Self  Support Systems: Spouse/significant other  [place] of Residence: [facility] do you live in?: [facility] entry access options   Select all that apply : Stairs  Number of steps to enter home : 3  Do the steps have railings?: Yes  Type of Current Residence: 2 story home  Upon entering residence, is there a bedroom on the main floor (no further steps)?: No  A bedroom is located on the following floor levels of residence (select all that apply):: 2nd Floor  Upon entering residence, is there a bathroom on the main floor (no further steps)?: Yes  Number of steps to 2nd floor from main floor: One Flight  In the last 12 months, was there a time when you were not able to pay the mortgage or rent on time?: No  In the last 12 months, how many places have you lived?: 1  In the last 12 months, was there a time when you did not have a steady place to sleep or slept in a shelter (including now)?: No  Homeless/housing insecurity resource given?: N/A  Living Arrangements: Lives w/ Spouse/significant other  Is patient a ?: No    Activities of Daily Living Prior to Admission  Functional Status: Independent  Completes ADLs independently?: Yes  Ambulates independently?: No  Level of ambulatory dependence: Assistance  Does patient use assisted devices?: Yes  Assisted Devices (DME) used: Straight Cane  Does patient currently own DME?: Yes  What DME does the patient currently own?: Straight Cane  Does patient have a history of Outpatient Therapy (PT/OT)?: No  Does the patient have a history of Short-Term Rehab?: No  Does patient have a history of HHC?: No  Does patient currently have [address]?: No         Patient Information Continued  Income Source: Employed  Does patient have prescription coverage?: No  Within the past 12 months, you worried that your food would run out before you got the money to buy more : Never true  Within the past 12 months, the food you bought just didn't last and you didn't have money to get more : Never true  Food insecurity resource given?: N/A  Does patient receive dialysis treatments?: No  Does patient have a history of substance abuse?: No  Does patient have a history of Mental Health Diagnosis?: No         Means of Transportation  Means of Transport to Appts[de-identified] Drives Self  In the past 12 months, has lack of transportation kept you from medical appointments or from getting medications?: No  Was application for public transport provided?: N/A        DISCHARGE DETAILS:    Discharge planning discussed with[de-identified] significant otherKristen  Freedom of Choice: Yes     CM contacted family/caregiver?: Yes             Contacts  Patient Contacts: Kristen Vicente, significant other  Relationship to Patient[de-identified] Friend  Contact Method: In Person  Reason/Outcome: Continuity of Care, Discharge [address]         Is the patient interested in [address] at discharge?: No    DME Referral Provided  Referral made for DME?: No                  CM met with significant other, Kristen Vicente, at the bedside (patient was in surgery), baseline information was obtained  CM discussed the role of CM in helping the patient develop a discharge plan and assist the patient in carry out their plan  Patient and significant other live in 2 story home  Patient currently on short term disability from work and ambulates with cane  CM discussed with significant other potential HHC referral for patient following procedure today; significant other indicated patient does not want HHC, patient requests outpatient wound care at [facility], as previously attended  CM to follow for additional CM referral needs

## 2022-11-30 NOTE — PLAN OF CARE
Problem: Potential for Falls  Goal: Patient will remain free of falls  Description: INTERVENTIONS:  - Educate patient/family on patient safety including physical limitations  - Instruct patient to call for assistance with activity   - Consult OT/PT to assist with strengthening/mobility   - Keep Call bell within reach  - Keep bed low and locked with side rails adjusted as appropriate  - Keep care items and personal belongings within reach  - Initiate and maintain comfort rounds  - Make Fall Risk Sign visible to staff  - Offer Toileting every  Hours, in advance of need  - Initiate/Maintain alarm  - Obtain necessary fall risk management equipment:   - Apply yellow socks and bracelet for high fall risk patients  - Consider moving patient to room near nurses station  Outcome: Progressing     Problem: PAIN - ADULT  Goal: Verbalizes/displays adequate comfort level or baseline comfort level  Description: Interventions:  - Encourage patient to monitor pain and request assistance  - Assess pain using appropriate pain scale  - Administer analgesics based on type and severity of pain and evaluate response  - Implement non-pharmacological measures as appropriate and evaluate response  - Consider cultural and social influences on pain and pain management  - Notify physician/advanced practitioner if interventions unsuccessful or patient reports new pain  Outcome: Progressing     Problem: INFECTION - ADULT  Goal: Absence or prevention of progression during hospitalization  Description: INTERVENTIONS:  - Assess and monitor for signs and symptoms of infection  - Monitor lab/diagnostic results  - Monitor all insertion sites, i e  indwelling lines, tubes, and drains  - Monitor endotracheal if appropriate and nasal secretions for changes in amount and color  - [place] appropriate cooling/warming therapies per order  - Administer medications as ordered  - Instruct and encourage patient and family to use good hand hygiene technique  - Identify and instruct in appropriate isolation precautions for identified infection/condition  Outcome: Progressing  Goal: Absence of fever/infection during neutropenic period  Description: INTERVENTIONS:  - Monitor WBC    Outcome: Progressing     Problem: SAFETY ADULT  Goal: Patient will remain free of falls  Description: INTERVENTIONS:  - Educate patient/family on patient safety including physical limitations  - Instruct patient to call for assistance with activity   - Consult OT/PT to assist with strengthening/mobility   - Keep Call bell within reach  - Keep bed low and locked with side rails adjusted as appropriate  - Keep care items and personal belongings within reach  - Initiate and maintain comfort rounds  - Make Fall Risk Sign visible to staff  - Offer Toileting every  Hours, in advance of need  - Initiate/Maintain alarm  - Obtain necessary fall risk management equipment:   - Apply yellow socks and bracelet for high fall risk patients  - Consider moving patient to room near nurses station  Outcome: Progressing  Goal: Maintain or return to baseline ADL function  Description: INTERVENTIONS:  -  Assess patient's ability to carry out ADLs; assess patient's baseline for ADL function and identify physical deficits which impact ability to perform ADLs (bathing, care of mouth/teeth, toileting, grooming, dressing, etc )  - Assess/evaluate cause of self-care deficits   - Assess range of motion  - Assess patient's mobility; develop plan if impaired  - Assess patient's need for assistive devices and provide as appropriate  - Encourage maximum independence but intervene and supervise when necessary  - Involve family in performance of ADLs  - Assess for home care needs following discharge   - Consider OT consult to assist with ADL evaluation and planning for discharge  - Provide patient education as appropriate  Outcome: Progressing  Goal: Maintains/Returns to pre admission functional level  Description: INTERVENTIONS:  - Perform BMAT or MOVE assessment daily    - Set and communicate daily mobility goal to care team and patient/family/caregiver  - Collaborate with rehabilitation services on mobility goals if consulted  - Perform Range of Motion  times a day  - Reposition patient every  hours  - Dangle patient  times a day  - Stand patient  times a day  - Ambulate patient  times a day  - Out of bed to chair  times a day   - Out of bed for meals  times a day  - Out of bed for toileting  - Record patient progress and toleration of activity level   Outcome: Progressing     Problem: DISCHARGE PLANNING  Goal: Discharge to home or other facility with appropriate resources  Description: INTERVENTIONS:  - Identify barriers to discharge w/patient and caregiver  - Arrange for needed discharge resources and transportation as appropriate  - Identify discharge learning needs (meds, wound care, etc )  - Arrange for interpretive services to assist at discharge as needed  - Refer to Case Management Department for coordinating discharge planning if the patient needs post-hospital services based on physician/advanced practitioner order or complex needs related to functional status, cognitive ability, or social support system  Outcome: Progressing     Problem: Knowledge Deficit  Goal: Patient/family/caregiver demonstrates understanding of disease process, treatment plan, medications, and discharge instructions  Description: Complete learning assessment and assess knowledge base    Interventions:  - Provide teaching at level of understanding  - Provide teaching via preferred learning methods  Outcome: Progressing     Problem: SKIN/TISSUE INTEGRITY - ADULT  Goal: Skin Integrity remains intact(Skin Breakdown Prevention)  Description: Assess:  -Perform [place] assessment every   -Clean and moisturize skin every   -Inspect skin when repositioning, toileting, and assisting with ADLS  -Assess under medical devices such as  every   -Assess extremities for adequate circulation and sensation     Bed Management:  -Have minimal linens on bed & keep smooth, unwrinkled  -Change linens as needed when moist or perspiring  -Avoid sitting or lying in one position for more than  hours while in bed  -Keep HOB degrees     Toileting:  -Offer bedside commode  -Assess for incontinence every   -Use incontinent care products after each incontinent episode such as     Activity:  -Mobilize patient  times a day  -Encourage activity and walks on unit  -Encourage or provide ROM exercises   -Turn and reposition patient every  Hours  -Use appropriate equipment to lift or move patient in bed  -Instruct/ Assist with weight shifting every  when out of bed in chair  -Consider limitation of chair time  hour intervals    Skin Care:  -Avoid use of baby powder, tape, friction and shearing, hot water or constrictive clothing  -Relieve pressure over bony prominences using   -Do not massage red bony areas    Next Steps:  -Teach patient strategies to minimize risks such as    -Consider consults to  interdisciplinary teams such as   Outcome: Progressing  Goal: Incision(s), wounds(s) or drain site(s) healing without S/S of infection  Description: INTERVENTIONS  - Assess and document dressing, incision, wound bed, drain sites and surrounding tissue  - Provide patient and family education  - Perform skin care/dressing changes every   Outcome: Progressing

## 2022-11-30 NOTE — ANESTHESIA POSTPROCEDURE EVALUATION
Post-Op Assessment Note    CV Status:  Stable    Pain management: adequate     Mental Status:  Awake   Hydration Status:  Stable   PONV Controlled:  Controlled   Airway Patency:  Patent      Post Op Vitals Reviewed: Yes      Staff: Anesthesiologist, CRNA         No notable events documented      /77 (11/30/22 1412)    Temp 97 7 °F (36 5 °C) (11/30/22 1412)    Pulse 55 (11/30/22 1412)   Resp 18 (11/30/22 1412)    SpO2 96 % (11/30/22 1412)

## 2022-11-30 NOTE — PROGRESS NOTES
Patient received from surgical services and was alert and oriented  Dressing in place and ace wrapped RLE  Patient drinking fluids and eating food  Pain controlled  Patient educated to ring for assistance and not get up on his own as he is post op and anesthesia, patient agreeable

## 2022-11-30 NOTE — ASSESSMENT & PLAN NOTE
Presented to ED with right foot wound with increasing redness and swelling to the foot with increased pain and now drainage of pus  • Outpatient follows with Podiatry - podiatry recommending admission for MRI due to concern for osteomyelitis    • Imaging- x-ray of right foot without signs for osteomyelitis  • MRI - this ulceration plantar surface of forefoot at the level of the 3rd MTP joint again seen with surrounding cellulitis, marrow edema within the 3rd metatarsal head and neck and proximal phalanx and 3rd middle phalanx adjacent to the plantar ulcer, no confluent decreased T1 marrow signal seen to definitively suggest osteomyelitis but given marrow edema in the adjacent ulcer, cannot exclude the possibility of early osteomyelitis  • WBC wnl  • Procal pending  • Temp afebrile  • BC : no growth 24hr    • Was started on vancomycin and Rocephin in the ED; will switch to Ancef  • Follow CBC and fever curve  • Podiatry consulted -bone marrow biopsy 11/30 with podiatry

## 2022-11-30 NOTE — ASSESSMENT & PLAN NOTE
Lab Results   Component Value Date    HGBA1C 6 5 (H) 08/17/2022       Recent Labs     11/29/22  1635 11/29/22  2107 11/30/22  0642 11/30/22  1056   POCGLU 87 164* 122 108       Blood Sugar Average: Last 72 hrs:  • (P) 130 [id number] Glipizide on hold with CKD 4  •  sliding scale insulin coverage with Accu-Cheks  • Carbohydrate controlled diet  • Hypoglycemia protocol  • controlled

## 2022-11-30 NOTE — WOUND OSTOMY CARE
Consult Note - Wound   Lennie Card 57 y o  male MRN: [id number]  Unit/Bed#: OR POOL Encounter: [id number]      History and Present Illness:  57year old male, cellulitis of right toe, Patient seen by Podiatry , in [address] [address]  Wound care will sign off no other skin issues     Wound Care will sign off  Call or Tigertext with any questions  Linda VELARDE RN

## 2022-11-30 NOTE — ANESTHESIA PREPROCEDURE EVALUATION
[facility] Emergency Department    [address]    [facility] [address]    Phone:  [phone]    Fax:  [phone]                                       Nevin Alvarado   MRN: [id number]    Department:  [facility] Emergency Department   Date of Visit:  11/5/2017           After Visit Summary Signature Page     I have received my discharge instructions, and my questions have been answered. I have discussed any challenges I see with this plan with the nurse or doctor.    ..........................................................................................................................................  Patient/Patient Representative Signature      ..........................................................................................................................................  Patient Representative Print Name and Relationship to Patient    ..................................................               ................................................  Date                                            Time    ..........................................................................................................................................  Reviewed by Signature/Title    ...................................................              ..............................................  Date                                                            Time           Procedure:  Right 3rd metatarsal head bone biopsy (Right: Foot)    Relevant Problems   ANESTHESIA (within normal limits)      CARDIO   (+) Essential hypertension      ENDO   (+) Acquired hypothyroidism   (+) Type 2 diabetes mellitus with circulatory disorder, without long-term current use of insulin (HCC)      GI/HEPATIC   (+) GERD (gastroesophageal reflux disease)      /RENAL   (+) Stage 4 chronic kidney disease (HCC)      HEMATOLOGY   (+) Chronic anemia      Other   (+) Cellulitis of right toe   (+) Class 2 obesity in adult   (+) Osteomyelitis of foot, right, acute (HCC)        Physical Exam    Airway    Mallampati score: II  TM Distance: >3 FB  Neck ROM: full     Dental       Cardiovascular      Pulmonary      Other Findings        Anesthesia Plan  ASA Score- 3     Anesthesia Type- IV sedation with anesthesia with ASA Monitors  Additional Monitors:   Airway Plan:           Plan Factors-Exercise tolerance (METS): >4 METS  Chart reviewed  EKG reviewed  Existing labs reviewed  Patient summary reviewed  Patient is not a current smoker  Induction-     Postoperative Plan-     Informed Consent- Anesthetic plan and risks discussed with patient  I personally reviewed this patient with the CRNA  Discussed and agreed on the Anesthesia Plan with the CRNA  Mckenzie Moya

## 2022-11-30 NOTE — ASSESSMENT & PLAN NOTE
Lab Results   Component Value Date    EGFR 33 11/30/2022    EGFR 32 11/29/2022    EGFR 37 11/28/2022    CREATININE 2 04 (H) 11/30/2022    CREATININE 2 08 (H) 11/29/2022    CREATININE 1 85 (H) 11/28/2022     • Review of care everywhere, patient has CKD with creatinine > 2 since 2020  • Creatinine at baseline on admission- history CKD 4  • Referred to Nephrology by PCP but declined  • Renally dose medications, avoid hypotension, avoid nephrotoxins  • Remains at baseline

## 2022-11-30 NOTE — CONSULTS
Consult - Podiatry   Amos Bullard 57 y o  male MRN: [id number]  Unit/Bed#: -01 Encounter: [id number]    Assessment/Plan     Assessment:  1  Right submet 3 head diabetic ulceration with cellulitis & suspicion for OM  2  DM (A1c 6 5% 8/17/22)  3  CKD 4    Plan:  - MRI right foot 11/29/22 images and report reviewed; marrow edema to 3rd metatarsal head and neck, prox phal and middle phal 3rd toe w/o decreased T1 marrow signal seen to definitely diag OM however cannot exclude early OM  Given inconclusive MRI, will Plan for right 3rd metatarsal head bone biopsy (path and micro) today  Has been NPO since midnight  Consent to be reviewed and signed in pre-op holding   - ESR (62)/CRP (15 9) elevated on admission  WBC WNL  VSS  Bcx NG24hr   - Rest of care, IV abx per primary team  - Thank you for consultation and allowing me to participate in care of this patient    BWS: WBAT sx shoe    History of Present Illness     HPI:  Amos Bullard is a 57 y o  male w/ PMH of diabetes mellitus, CKD, hypertension, obesity, right ankle ORIF who presents as admission and consultation for right foot wound and infection  Notes redness, increased drainage from wound for the past few days thus he presented to ED (after instructed to do so by our wound care office/myself) for further workup  ESR/CRP elevated  MRI performed  Feels well otherwise  Inpatient consult to Podiatry  Consult performed by: Diana Chung DPM  Consult ordered by: Román Lucia PA-C        Review of Systems   Constitutional: Negative  HENT: Negative  Eyes: Negative  Respiratory: Negative  Cardiovascular: Negative  Gastrointestinal: Negative  Musculoskeletal: Neg   Skin:right foot wound   Neurological: PN    Psych: negative         Historical Information   Past Medical History:   Diagnosis Date   • Chronic kidney failure, stage 2 (mild)    • Diabetes mellitus ([facility] [address] )    • GERD (gastroesophageal reflux disease)    • Gout    • Hypertension Past Surgical History:   Procedure Laterality Date   • ANKLE FRACTURE SURGERY Right    • CHOLECYSTECTOMY     • TOE AMPUTATION Right 11/2/2022    Procedure: RIGHT 2ND TOE PARTIAL AMPUTATION and right foot wound debridement;  Surgeon: Yifan Gomez DPM;  Location:  MAIN OR;  Service: Podiatry     Social History   Social History     Substance and Sexual Activity   Alcohol Use Not Currently     Social History     Substance and Sexual Activity   Drug Use Never     Social History     Tobacco Use   Smoking Status Former   • Packs/day: 1 00   • Types: Cigarettes   Smokeless Tobacco Never     Family History:   Family History   Problem Relation Age of Onset   • Gout Mother    • Diabetes Father    • Heart disease Father        Meds/Allergies   Medications Prior to Admission   Medication   • albuterol (PROVENTIL HFA,VENTOLIN HFA) 90 mcg/act inhaler   • allopurinol (ZYLOPRIM) 100 mg tablet   • aspirin (ECOTRIN LOW STRENGTH) 81 mg EC tablet   • atorvastatin (LIPITOR) 20 mg tablet   • Cinnamon 500 MG capsule   • co-enzyme Q-10 100 mg capsule   • ferrous sulfate 324 (65 Fe) mg   • fluticasone (FLOVENT HFA) 110 MCG/ACT inhaler   • glipiZIDE (GLUCOTROL XL) 5 mg 24 hr tablet   • levothyroxine 150 mcg tablet   • losartan (COZAAR) 25 mg tablet   • metoprolol succinate (TOPROL-XL) 100 mg 24 hr tablet   • omeprazole (PriLOSEC) 20 mg delayed release capsule     Allergies   Allergen Reactions   • Aspirin Other (See Comments)     Not an allergy, take off as precaution for the kidneys per patient      • Other Dermatitis   • Felodipine Other (See Comments)     Dizzy     • Lisinopril Other (See Comments)     hyperkalemia     • Niacin Itching     flushing and itching     • Nsaids Other (See Comments)     Low GFR   • Simvastatin Other (See Comments)     pancreatitis         Objective   First Vitals:   Blood Pressure: 154/89 (11/28/22 1259)  Pulse: 67 (11/28/22 1259)  Temperature: 98 °F (36 7 °C) (11/28/22 1259)  Temp Source: Oral (11/28/22 1749)  Respirations: 16 (11/28/22 1259)  Height: 5' 10" (177 8 cm) (11/28/22 2100)  Weight - Scale: 108 kg (238 lb) (11/28/22 1259)  SpO2: 98 % (11/28/22 1259)    Current Vitals:   Blood Pressure: 138/80 (11/30/22 0643)  Pulse: (!) 49 (11/30/22 0643)  Temperature: 97 5 °F (36 4 °C) (11/30/22 0643)  Temp Source: Oral (11/29/22 1100)  Respirations: 18 (11/30/22 0643)  Height: 5' 10" (177 8 cm) (11/28/22 2100)  Weight - Scale: 108 kg (238 lb) (11/28/22 1259)  SpO2: 98 % (11/30/22 0643)        /80   Pulse (!) 49   Temp 97 5 °F (36 4 °C)   Resp 18   Ht 5' 10" (1 778 m)   Wt 108 kg (238 lb)   SpO2 98%   BMI 34 15 kg/m²      General Appearance:    Alert, cooperative, no distress   Head:    Normocephalic, without obvious abnormality, atraumatic   Eyes:    PERRL, conjunctiva/corneas clear, EOM's intact        Nose:   Moist mucous membranes   Neck:   Supple, symmetrical, trachea midline   Back:     Symmetric   Lungs:     Respirations unlabored   Heart:    Regular rate and rhythm, S1 and S2 normal, no murmur, rub   or gallop   Abdomen:     Soft, non-tender    B/L LE EXAM   Extremities:   S/p right 2nd toe partial amputation, healed  No foot pain  Pulses:   B/L DP & PT pulses 1/4  Legs to toes warm to warm  Pedal hair diminished  CRT WNL  Skin:   R submet 3 head with ulceration, + deep probe  Mild cellulitic erythema periwound extending to 3rd toe  Neurologic:   Gross sensation is intact  Protective sensation is absent to feet  Endorses PN                     Lab Results:   Admission on 11/28/2022   Component Date Value   • WBC 11/28/2022 7 94    • RBC 11/28/2022 3 10 (L)    • Hemoglobin 11/28/2022 10 6 (L)    • Hematocrit 11/28/2022 32 2 (L)    • MCV 11/28/2022 104 (H)    • MCH 11/28/2022 34 2    • MCHC 11/28/2022 32 9    • RDW 11/28/2022 13 1    • MPV 11/28/2022 10 1    • Platelets 69/80/6879 181    • nRBC 11/28/2022 0    • Neutrophils Relative 11/28/2022 51    • Immat GRANS % 11/28/2022 0    • Lymphocytes Relative 11/28/2022 31    • Monocytes Relative 11/28/2022 12    • Eosinophils Relative 11/28/2022 6    • Basophils Relative 11/28/2022 0    • Neutrophils Absolute 11/28/2022 4 02    • Immature Grans Absolute 11/28/2022 0 03    • Lymphocytes Absolute 11/28/2022 2 44    • Monocytes Absolute 11/28/2022 0 98    • Eosinophils Absolute 11/28/2022 0 44    • Basophils Absolute 11/28/2022 0 03    • Protime 11/28/2022 13 7    • INR 11/28/2022 1 04    • PTT 11/28/2022 28    • Sodium 11/28/2022 136    • Potassium 11/28/2022 5 0    • Chloride 11/28/2022 102    • CO2 11/28/2022 23    • ANION GAP 11/28/2022 11    • BUN 11/28/2022 27 (H)    • Creatinine 11/28/2022 1 85 (H)    • Glucose 11/28/2022 155 (H)    • Calcium 11/28/2022 8 8    • AST 11/28/2022 41    • ALT 11/28/2022 27    • Alkaline Phosphatase 11/28/2022 73    • Total Protein 11/28/2022 8 0    • Albumin 11/28/2022 3 7    • Total Bilirubin 11/28/2022 0 44    • eGFR 11/28/2022 37    • Lipase 11/28/2022 111    • LACTIC ACID 11/28/2022 1 3    • Blood Culture 11/28/2022 No Growth at 24 hrs  • Blood Culture 11/28/2022 No Growth at 24 hrs      • CRP 11/28/2022 15 9 (H)    • Sed Rate 11/28/2022 62 (H)    • Procalcitonin 11/28/2022 0 10    • POC Glucose 11/28/2022 151 (H)    • Sodium 11/29/2022 140    • Potassium 11/29/2022 4 8    • Chloride 11/29/2022 104    • CO2 11/29/2022 28    • ANION GAP 11/29/2022 8    • BUN 11/29/2022 26 (H)    • Creatinine 11/29/2022 2 08 (H)    • Glucose 11/29/2022 113    • Calcium 11/29/2022 9 2    • eGFR 11/29/2022 32    • WBC 11/29/2022 7 73    • RBC 11/29/2022 3 30 (L)    • Hemoglobin 11/29/2022 11 1 (L)    • Hematocrit 11/29/2022 34 4 (L)    • MCV 11/29/2022 104 (H)    • MCH 11/29/2022 33 6    • MCHC 11/29/2022 32 3    • RDW 11/29/2022 13 1    • MPV 11/29/2022 10 1    • Platelets 05/84/6793 178    • nRBC 11/29/2022 0    • Neutrophils Relative 11/29/2022 43    • Immat GRANS % 11/29/2022 0    • Lymphocytes Relative 11/29/2022 40    • Monocytes Relative 11/29/2022 11    • Eosinophils Relative 11/29/2022 6    • Basophils Relative 11/29/2022 0    • Neutrophils Absolute 11/29/2022 3 30    • Immature Grans Absolute 11/29/2022 0 03    • Lymphocytes Absolute 11/29/2022 3 09    • Monocytes Absolute 11/29/2022 0 82    • Eosinophils Absolute 11/29/2022 0 46    • Basophils Absolute 11/29/2022 0 03    • POC Glucose 11/29/2022 121    • POC Glucose 11/29/2022 159 (H)    • POC Glucose 11/29/2022 87    • POC Glucose 11/29/2022 164 (H)    • Sodium 11/30/2022 138    • Potassium 11/30/2022 4 4    • Chloride 11/30/2022 102    • CO2 11/30/2022 26    • ANION GAP 11/30/2022 10    • BUN 11/30/2022 25    • Creatinine 11/30/2022 2 04 (H)    • Glucose 11/30/2022 116    • Calcium 11/30/2022 9 4    • eGFR 11/30/2022 33    • WBC 11/30/2022 8 19    • RBC 11/30/2022 3 28 (L)    • Hemoglobin 11/30/2022 11 3 (L)    • Hematocrit 11/30/2022 34 1 (L)    • MCV 11/30/2022 104 (H)    • MCH 11/30/2022 34 5 (H)    • MCHC 11/30/2022 33 1    • RDW 11/30/2022 13 0    • MPV 11/30/2022 9 7    • Platelets 36/75/2077 191    • nRBC 11/30/2022 0    • Neutrophils Relative 11/30/2022 38 (L)    • Immat GRANS % 11/30/2022 0    • Lymphocytes Relative 11/30/2022 46 (H)    • Monocytes Relative 11/30/2022 9    • Eosinophils Relative 11/30/2022 6    • Basophils Relative 11/30/2022 1    • Neutrophils Absolute 11/30/2022 3 11    • Immature Grans Absolute 11/30/2022 0 03    • Lymphocytes Absolute 11/30/2022 3 81    • Monocytes Absolute 11/30/2022 0 73    • Eosinophils Absolute 11/30/2022 0 46    • Basophils Absolute 11/30/2022 0 05    • POC Glucose 11/30/2022 122              Results from last 7 days   Lab Units 11/28/22  1519   BLOOD CULTURE  No Growth at 24 hrs  No Growth at 24 hrs  Invalid input(s): LABAEARO            Imaging: I have personally reviewed pertinent films in PACS  EKG, Pathology, and Other Studies: I have personally reviewed pertinent reports        Code Status: Level 1 - Full Code  Advance Directive and Living Will:      Power of :    POLST:

## 2022-11-30 NOTE — OP NOTE
OPERATIVE REPORT  PATIENT NAME: Wanda Hare    :  1960  MRN: [id number]  Pt Location: OW OR ROOM 01    SURGERY DATE: 2022    Surgeon(s) and Role:     Lieutenant Jamila DPM - Primary    Preop Diagnosis:  Cellulitis of right lower extremity [L03 115]    Post-Op Diagnosis Codes:     * Cellulitis of right lower extremity [L03 115]    Procedure(s) (LRB):  Right 3rd metatarsal head bone biopsy (Right)    Specimen(s):  ID Type Source Tests Collected by Time Destination   1 : right third metatarsal Tissue Foot, Right TISSUE EXAM Edward Bennett DPM 2022 1312    A : right third metatarsal Wound Wound ANAEROBIC CULTURE AND GRAM STAIN, WOUND CULTURE Edward Bennett DPM 2022 1312        Estimated Blood Loss:   Minimal    Drains:  * No LDAs found *    Anesthesia Type:   IV Sedation with Anesthesia    Operative Indications:  Cellulitis of right lower extremity [L03 115]    Operative Findings:  - Hard appearing bone to 3rd metatarsal head/neck  - Plantar 3rd met head ulceration    Plan to await results of pathology and micro to determine further treatment course  Complications:   None    Procedure and Technique:    Under mild sedation, the patient was brought into the operating room and remained on stretcher in the supine position  A pneumatic ankle tourniquet was then placed around the patient's right ankle with ample webril padding  A time out was performed to confirm the correct patient, procedure and site with all parties in agreement  Following IV sedation, an ankle block was performed consisting of 10 ml of 1% Lidocaine and 0 25% Bupivacaine in a 1:1 mixture  The foot was then scrubbed, prepped and draped in the usual aseptic manner  Attention was directed to the dorsal 3rd MTPJ where a linear incision was made through skin to subcutaneous level with a #15 blade  The incision was deepened using blunt dissection to distal bone of 3rd metatarsal  The wound was flushed with nss   TOÑA vilchis used to take 3 samples of the 3rd metatarsal head for pathology, micro (aerobic and anaerobic) to R/O osteomyelitis  The wound was flushed with nss  Deep dermal layer reapprox with vicryl, skin reapprox with nylon  Adaptic dsd applied  The patient tolerated the procedure and anesthesia well and was transported to the PACU with vital signs stable  As with many limb salvage procedures, we contemplate the possibility of performing further stages to this procedure  Procedures may include debridements, delayed closure, plastic surgery techniques, or more proximal amputations  This procedure may be considered part of a multi-staged limb salvage treatment plan       I was present for the entire procedure    Patient Disposition:  PACU         SIGNATURE: Pramod Barrientos DPM  DATE: November 30, 2022  TIME: 1:29 PM

## 2022-12-01 ENCOUNTER — TELEPHONE (OUTPATIENT)
Dept: PODIATRY | Facility: CLINIC | Age: 62
End: 2022-12-01

## 2022-12-01 ENCOUNTER — TELEPHONE (OUTPATIENT)
Dept: OBGYN CLINIC | Facility: HOSPITAL | Age: 62
End: 2022-12-01

## 2022-12-01 LAB
ANION GAP SERPL CALCULATED.3IONS-SCNC: 6 MMOL/L (ref 4–13)
BASOPHILS # BLD AUTO: 0.03 THOUSANDS/ÂΜL (ref 0–0.1)
BASOPHILS NFR BLD AUTO: 0 % (ref 0–1)
BUN SERPL-MCNC: 30 MG/DL (ref 5–25)
CALCIUM SERPL-MCNC: 8.7 MG/DL (ref 8.3–10.1)
CHLORIDE SERPL-SCNC: 101 MMOL/L (ref 96–108)
CO2 SERPL-SCNC: 29 MMOL/L (ref 21–32)
CREAT SERPL-MCNC: 2.16 MG/DL (ref 0.6–1.3)
EOSINOPHIL # BLD AUTO: 0.26 THOUSAND/ÂΜL (ref 0–0.61)
EOSINOPHIL NFR BLD AUTO: 4 % (ref 0–6)
ERYTHROCYTE [DISTWIDTH] IN BLOOD BY AUTOMATED COUNT: 12.9 % (ref 11.6–15.1)
GFR SERPL CREATININE-BSD FRML MDRD: 31 ML/MIN/1.73SQ M
GLUCOSE SERPL-MCNC: 114 MG/DL (ref 65–140)
GLUCOSE SERPL-MCNC: 115 MG/DL (ref 65–140)
GLUCOSE SERPL-MCNC: 139 MG/DL (ref 65–140)
GLUCOSE SERPL-MCNC: 142 MG/DL (ref 65–140)
GLUCOSE SERPL-MCNC: 171 MG/DL (ref 65–140)
HCT VFR BLD AUTO: 33.2 % (ref 36.5–49.3)
HGB BLD-MCNC: 10.8 G/DL (ref 12–17)
IMM GRANULOCYTES # BLD AUTO: 0.04 THOUSAND/UL (ref 0–0.2)
IMM GRANULOCYTES NFR BLD AUTO: 1 % (ref 0–2)
LYMPHOCYTES # BLD AUTO: 1.65 THOUSANDS/ÂΜL (ref 0.6–4.47)
LYMPHOCYTES NFR BLD AUTO: 24 % (ref 14–44)
MCH RBC QN AUTO: 33.8 PG (ref 26.8–34.3)
MCHC RBC AUTO-ENTMCNC: 32.5 G/DL (ref 31.4–37.4)
MCV RBC AUTO: 104 FL (ref 82–98)
MONOCYTES # BLD AUTO: 0.76 THOUSAND/ÂΜL (ref 0.17–1.22)
MONOCYTES NFR BLD AUTO: 11 % (ref 4–12)
NEUTROPHILS # BLD AUTO: 4.19 THOUSANDS/ÂΜL (ref 1.85–7.62)
NEUTS SEG NFR BLD AUTO: 60 % (ref 43–75)
NRBC BLD AUTO-RTO: 0 /100 WBCS
PLATELET # BLD AUTO: 172 THOUSANDS/UL (ref 149–390)
PMV BLD AUTO: 10 FL (ref 8.9–12.7)
POTASSIUM SERPL-SCNC: 5.2 MMOL/L (ref 3.5–5.3)
RBC # BLD AUTO: 3.2 MILLION/UL (ref 3.88–5.62)
SODIUM SERPL-SCNC: 136 MMOL/L (ref 135–147)
WBC # BLD AUTO: 6.93 THOUSAND/UL (ref 4.31–10.16)

## 2022-12-01 RX ORDER — LOSARTAN POTASSIUM 25 MG/1
25 TABLET ORAL DAILY
Status: DISCONTINUED | OUTPATIENT
Start: 2022-12-01 | End: 2022-12-02

## 2022-12-01 RX ADMIN — ALLOPURINOL 100 MG: 100 TABLET ORAL at 09:54

## 2022-12-01 RX ADMIN — PANTOPRAZOLE SODIUM 40 MG: 40 TABLET, DELAYED RELEASE ORAL at 05:01

## 2022-12-01 RX ADMIN — PANTOPRAZOLE SODIUM 40 MG: 40 TABLET, DELAYED RELEASE ORAL at 05:00

## 2022-12-01 RX ADMIN — OXYCODONE HYDROCHLORIDE 5 MG: 5 TABLET ORAL at 21:34

## 2022-12-01 RX ADMIN — POLYETHYLENE GLYCOL 3350 17 G: 17 POWDER, FOR SOLUTION ORAL at 09:54

## 2022-12-01 RX ADMIN — LEVOTHYROXINE SODIUM 150 MCG: 150 TABLET ORAL at 05:00

## 2022-12-01 RX ADMIN — DOCUSATE SODIUM 100 MG: 100 CAPSULE ORAL at 09:53

## 2022-12-01 RX ADMIN — HEPARIN SODIUM 5000 UNITS: 5000 INJECTION INTRAVENOUS; SUBCUTANEOUS at 21:34

## 2022-12-01 RX ADMIN — DOCUSATE SODIUM 100 MG: 100 CAPSULE ORAL at 17:02

## 2022-12-01 RX ADMIN — LOSARTAN POTASSIUM 25 MG: 25 TABLET, FILM COATED ORAL at 09:53

## 2022-12-01 RX ADMIN — CEFAZOLIN SODIUM 2000 MG: 2 SOLUTION INTRAVENOUS at 21:34

## 2022-12-01 RX ADMIN — ONDANSETRON 4 MG: 2 INJECTION INTRAMUSCULAR; INTRAVENOUS at 07:12

## 2022-12-01 RX ADMIN — LEVOTHYROXINE SODIUM 150 MCG: 150 TABLET ORAL at 05:01

## 2022-12-01 RX ADMIN — HEPARIN SODIUM 5000 UNITS: 5000 INJECTION INTRAVENOUS; SUBCUTANEOUS at 14:53

## 2022-12-01 RX ADMIN — CEFAZOLIN SODIUM 2000 MG: 2 SOLUTION INTRAVENOUS at 05:01

## 2022-12-01 RX ADMIN — ATORVASTATIN CALCIUM 20 MG: 20 TABLET, FILM COATED ORAL at 17:02

## 2022-12-01 RX ADMIN — HEPARIN SODIUM 5000 UNITS: 5000 INJECTION INTRAVENOUS; SUBCUTANEOUS at 05:02

## 2022-12-01 RX ADMIN — METOPROLOL SUCCINATE 100 MG: 100 TABLET, EXTENDED RELEASE ORAL at 09:53

## 2022-12-01 RX ADMIN — ACETAMINOPHEN 650 MG: 325 TABLET ORAL at 07:20

## 2022-12-01 RX ADMIN — CEFAZOLIN SODIUM 2000 MG: 2 SOLUTION INTRAVENOUS at 14:49

## 2022-12-01 RX ADMIN — OXYCODONE HYDROCHLORIDE 5 MG: 5 TABLET ORAL at 09:54

## 2022-12-01 NOTE — ASSESSMENT & PLAN NOTE
Lab Results   Component Value Date    HGBA1C 6 5 (H) 08/17/2022       Recent Labs     11/30/22  1600 11/30/22  2046 12/01/22  0754 12/01/22  1122   POCGLU 140 139 139 142*       Blood Sugar Average: Last 72 hrs:  • (P) 700 [id number] Glipizide on hold with CKD 4  •  sliding scale insulin coverage with Accu-Cheks  • Carbohydrate controlled diet  • Hypoglycemia protocol  • controlled

## 2022-12-01 NOTE — PLAN OF CARE
Problem: Potential for Falls  Goal: Patient will remain free of falls  Description: INTERVENTIONS:  - Educate patient/family on patient safety including physical limitations  - Instruct patient to call for assistance with activity   - Consult OT/PT to assist with strengthening/mobility   - Keep Call bell within reach  - Keep bed low and locked with side rails adjusted as appropriate  - Keep care items and personal belongings within reach  - Initiate and maintain comfort rounds  - Make Fall Risk Sign visible to staff  - Offer Toileting every  Hours, in advance of need  - Initiate/Maintain alarm  - Obtain necessary fall risk management equipment:   - Apply yellow socks and bracelet for high fall risk patients  - Consider moving patient to room near nurses station  Outcome: Progressing     Problem: PAIN - ADULT  Goal: Verbalizes/displays adequate comfort level or baseline comfort level  Description: Interventions:  - Encourage patient to monitor pain and request assistance  - Assess pain using appropriate pain scale  - Administer analgesics based on type and severity of pain and evaluate response  - Implement non-pharmacological measures as appropriate and evaluate response  - Consider cultural and social influences on pain and pain management  - Notify physician/advanced practitioner if interventions unsuccessful or patient reports new pain  Outcome: Progressing     Problem: INFECTION - ADULT  Goal: Absence or prevention of progression during hospitalization  Description: INTERVENTIONS:  - Assess and monitor for signs and symptoms of infection  - Monitor lab/diagnostic results  - Monitor all insertion sites, i e  indwelling lines, tubes, and drains  - Monitor endotracheal if appropriate and nasal secretions for changes in amount and color  - [place] appropriate cooling/warming therapies per order  - Administer medications as ordered  - Instruct and encourage patient and family to use good hand hygiene technique  - Identify and instruct in appropriate isolation precautions for identified infection/condition  Outcome: Progressing  Goal: Absence of fever/infection during neutropenic period  Description: INTERVENTIONS:  - Monitor WBC    Outcome: Progressing     Problem: SAFETY ADULT  Goal: Patient will remain free of falls  Description: INTERVENTIONS:  - Educate patient/family on patient safety including physical limitations  - Instruct patient to call for assistance with activity   - Consult OT/PT to assist with strengthening/mobility   - Keep Call bell within reach  - Keep bed low and locked with side rails adjusted as appropriate  - Keep care items and personal belongings within reach  - Initiate and maintain comfort rounds  - Make Fall Risk Sign visible to staff  - Offer Toileting every  Hours, in advance of need  - Initiate/Maintain alarm  - Obtain necessary fall risk management equipment:   - Apply yellow socks and bracelet for high fall risk patients  - Consider moving patient to room near nurses station  Outcome: Progressing  Goal: Maintain or return to baseline ADL function  Description: INTERVENTIONS:  -  Assess patient's ability to carry out ADLs; assess patient's baseline for ADL function and identify physical deficits which impact ability to perform ADLs (bathing, care of mouth/teeth, toileting, grooming, dressing, etc )  - Assess/evaluate cause of self-care deficits   - Assess range of motion  - Assess patient's mobility; develop plan if impaired  - Assess patient's need for assistive devices and provide as appropriate  - Encourage maximum independence but intervene and supervise when necessary  - Involve family in performance of ADLs  - Assess for home care needs following discharge   - Consider OT consult to assist with ADL evaluation and planning for discharge  - Provide patient education as appropriate  Outcome: Progressing  Goal: Maintains/Returns to pre admission functional level  Description: INTERVENTIONS:  - Perform BMAT or MOVE assessment daily    - Set and communicate daily mobility goal to care team and patient/family/caregiver  - Collaborate with rehabilitation services on mobility goals if consulted  - Perform Range of Motion  times a day  - Reposition patient every  hours  - Dangle patient  times a day  - Stand patient  times a day  - Ambulate patient  times a day  - Out of bed to chair  times a day   - Out of bed for meals  times a day  - Out of bed for toileting  - Record patient progress and toleration of activity level   Outcome: Progressing     Problem: DISCHARGE PLANNING  Goal: Discharge to home or other facility with appropriate resources  Description: INTERVENTIONS:  - Identify barriers to discharge w/patient and caregiver  - Arrange for needed discharge resources and transportation as appropriate  - Identify discharge learning needs (meds, wound care, etc )  - Arrange for interpretive services to assist at discharge as needed  - Refer to Case Management Department for coordinating discharge planning if the patient needs post-hospital services based on physician/advanced practitioner order or complex needs related to functional status, cognitive ability, or social support system  Outcome: Progressing     Problem: Knowledge Deficit  Goal: Patient/family/caregiver demonstrates understanding of disease process, treatment plan, medications, and discharge instructions  Description: Complete learning assessment and assess knowledge base    Interventions:  - Provide teaching at level of understanding  - Provide teaching via preferred learning methods  Outcome: Progressing     Problem: SKIN/TISSUE INTEGRITY - ADULT  Goal: Skin Integrity remains intact(Skin Breakdown Prevention)  Description: Assess:  -Perform [place] assessment every   -Clean and moisturize skin every   -Inspect skin when repositioning, toileting, and assisting with ADLS  -Assess under medical devices such as  every   -Assess extremities for adequate circulation and sensation     Bed Management:  -Have minimal linens on bed & keep smooth, unwrinkled  -Change linens as needed when moist or perspiring  -Avoid sitting or lying in one position for more than  hours while in bed  -Keep HOB at degrees     Toileting:  -Offer bedside commode  -Assess for incontinence every   -Use incontinent care products after each incontinent episode such as     Activity:  -Mobilize patient  times a day  -Encourage activity and walks on unit  -Encourage or provide ROM exercises   -Turn and reposition patient every  Hours  -Use appropriate equipment to lift or move patient in bed  -Instruct/ Assist with weight shifting every  when out of bed in chair  -Consider limitation of chair time  hour intervals    Skin Care:  -Avoid use of baby powder, tape, friction and shearing, hot water or constrictive clothing  -Relieve pressure over bony prominences using   -Do not massage red bony areas    Next Steps:  -Teach patient strategies to minimize risks such as    -Consider consults to  interdisciplinary teams such as   Outcome: Progressing  Goal: Incision(s), wounds(s) or drain site(s) healing without S/S of infection  Description: INTERVENTIONS  - Assess and document dressing, incision, wound bed, drain sites and surrounding tissue  - Provide patient and family education  - Perform skin care/dressing changes every   Outcome: Progressing     Problem: Nutrition/Hydration-ADULT  Goal: Nutrient/Hydration intake appropriate for improving, restoring or maintaining nutritional needs  Description: Monitor and assess patient's nutrition/hydration status for malnutrition  Collaborate with interdisciplinary team and initiate plan and interventions as ordered  Monitor patient's weight and dietary intake as ordered or per policy  Utilize nutrition screening tool and intervene as necessary   Determine patient's food preferences and provide high-protein, high-caloric foods as appropriate       INTERVENTIONS:  - Monitor oral intake, urinary output, labs, and treatment plans  - Assess nutrition and hydration status and recommend course of action  - Evaluate amount of meals eaten  - Assist patient with eating if necessary   - Allow adequate time for meals  - Recommend/ encourage appropriate diets, oral nutritional supplements, and vitamin/mineral supplements  - Order, calculate, and assess calorie counts as needed  - Recommend, monitor, and adjust tube feedings and TPN/PPN based on assessed needs  - Assess need for intravenous fluids  - Provide specific nutrition/hydration education as appropriate  - Include patient/family/caregiver in decisions related to nutrition  Outcome: Progressing

## 2022-12-01 NOTE — ASSESSMENT & PLAN NOTE
Presented to ED with right foot wound with increasing redness and swelling to the foot with increased pain and now drainage of pus  • Outpatient follows with Podiatry - podiatry recommending admission for MRI due to concern for osteomyelitis    • Imaging- x-ray of right foot without signs for osteomyelitis  • MRI - this ulceration plantar surface of forefoot at the level of the 3rd MTP joint again seen with surrounding cellulitis, marrow edema within the 3rd metatarsal head and neck and proximal phalanx and 3rd middle phalanx adjacent to the plantar ulcer, no confluent decreased T1 marrow signal seen to definitively suggest osteomyelitis but given marrow edema in the adjacent ulcer, cannot exclude the possibility of early osteomyelitis  • WBC wnl  • Procal pending  • Temp afebrile  • BC : no growth 48hr  • Was started on vancomycin and Rocephin in the ED; will switch to Ancef  • Follow CBC and fever curve  • Podiatry consulted -bone marrow biopsy 11/30 with podiatry- pending

## 2022-12-01 NOTE — ASSESSMENT & PLAN NOTE
Lab Results   Component Value Date    EGFR 31 12/01/2022    EGFR 33 11/30/2022    EGFR 32 11/29/2022    CREATININE 2 16 (H) 12/01/2022    CREATININE 2 04 (H) 11/30/2022    CREATININE 2 08 (H) 11/29/2022     • Review of care everywhere, patient has CKD with creatinine > 2 since 2020  • Creatinine at baseline on admission- history CKD 4  • Referred to Nephrology by PCP but declined  • Renally dose medications, avoid hypotension, avoid nephrotoxins  • Remains at baseline

## 2022-12-01 NOTE — PLAN OF CARE
Problem: INFECTION - ADULT  Goal: Absence or prevention of progression during hospitalization  Description: INTERVENTIONS:  - Assess and monitor for signs and symptoms of infection  - Monitor lab/diagnostic results  - Monitor all insertion sites, i e  indwelling lines, tubes, and drains  - Monitor endotracheal if appropriate and nasal secretions for changes in amount and color  - [place] appropriate cooling/warming therapies per order  - Administer medications as ordered  - Instruct and encourage patient and family to use good hand hygiene technique  - Identify and instruct in appropriate isolation precautions for identified infection/condition  Outcome: Progressing

## 2022-12-01 NOTE — PROGRESS NOTES
[address]  Progress Note - Padma Colin 1960, 57 y o  male MRN: [id number]  Unit/Bed#: -[facility] Encounter: [id number]  Primary Care Provider: Yamil Turner MD   Date and time admitted to hospital: 11/28/2022  2:34 PM    * Cellulitis of right toe  Assessment & Plan  Presented to ED with right foot wound with increasing redness and swelling to the foot with increased pain and now drainage of pus  • Outpatient follows with Podiatry - podiatry recommending admission for MRI due to concern for osteomyelitis    • Imaging- x-ray of right foot without signs for osteomyelitis  • MRI - this ulceration plantar surface of forefoot at the level of the 3rd MTP joint again seen with surrounding cellulitis, marrow edema within the 3rd metatarsal head and neck and proximal phalanx and 3rd middle phalanx adjacent to the plantar ulcer, no confluent decreased T1 marrow signal seen to definitively suggest osteomyelitis but given marrow edema in the adjacent ulcer, cannot exclude the possibility of early osteomyelitis  • WBC wnl  • Procal pending  • Temp afebrile  • BC : no growth 48hr  • Was started on vancomycin and Rocephin in the ED; will switch to Ancef  • Follow CBC and fever curve  • Podiatry consulted -bone marrow biopsy 11/30 with podiatry- pending      Type 2 diabetes mellitus with circulatory disorder, without long-term current use of insulin [facility])  Assessment & Plan  Lab Results   Component Value Date    HGBA1C 6 5 (H) 08/17/2022       Recent Labs     11/30/22  1600 11/30/22  2046 12/01/22  0754 12/01/22  1122   POCGLU 140 139 139 142*       Blood Sugar Average: Last 72 hrs:  • (P) 031 [id number] Glipizide on hold with CKD 4  •  sliding scale insulin coverage with Accu-Cheks  • Carbohydrate controlled diet  • Hypoglycemia protocol  • controlled      Chronic anemia  Assessment & Plan  • Chronic anemia  • Hemoglobin baseline appears to be around 9-10 recently - Probable secondary to CKD 4  • Monitor closely    Essential hypertension  Assessment & Plan  • Continue PTA metoprolol succinate 100 mg daily and losartan 25 mg daily   • Trend blood pressures  • Controlled on current regimen, avoid hypotension    Stage 4 chronic kidney disease [facility])  Assessment & Plan  Lab Results   Component Value Date    EGFR 31 2022    EGFR 33 2022    EGFR 32 2022    CREATININE 2 16 (H) 2022    CREATININE 2 04 (H) 2022    CREATININE 2 08 (H) 2022     • Review of care everywhere, patient has CKD with creatinine > 2 since   • Creatinine at baseline on admission- history CKD 4  • Referred to Nephrology by PCP but declined  • Renally dose medications, avoid hypotension, avoid nephrotoxins  • Remains at baseline    Acquired hypothyroidism  Assessment & Plan  · Continue levothyroxine        VTE Pharmacologic Prophylaxis: VTE Score: 4 Moderate Risk (Score 3-4) - Pharmacological DVT Prophylaxis Ordered: heparin  Patient Centered Rounds: I performed bedside rounds with nursing staff today  Discussions with Specialists or Other Care Team Provider:  Cm    Education and Discussions with Family / Patient: Patient declined call to   Time Spent for Care: 45 minutes  More than 50% of total time spent on counseling and coordination of care as described above  Current Length of Stay: 3 day(s)  Current Patient Status: Inpatient   Certification Statement: The patient will continue to require additional inpatient hospital stay due to Pending bone biopsy culture results for further discharge planning  Discharge Plan: Anticipate discharge in 24-48 hrs to home  Code Status: Level 1 - Full Code    Subjective:   Reports passing gas has added bowel regimen yesterday discussed adding Dulcolax if he does not have a bowel movement in added to p r n    Otherwise denies issues overnight and is pending    Objective:     Vitals:   Temp (24hrs), Av 5 °F (36 9 °C), Min:97 9 °F (36 6 °C), Max:99 1 °F (37 3 °C)    Temp:  [97 9 °F (36 6 °C)-99 1 °F (37 3 °C)] 98 1 °F (36 7 °C)  HR:  [53-67] 56  Resp:  [18-26] 22  BP: (121-132)/(62-73) 124/70  SpO2:  [93 %-96 %] 96 %  Body mass index is 34 15 kg/m²  Input and Output Summary (last 24 hours): Intake/Output Summary (Last 24 hours) at 12/1/2022 1653  Last data filed at 12/1/2022 1500  Gross per 24 hour   Intake 840 ml   Output 1725 ml   Net -885 ml       Physical Exam:   Physical Exam  Vitals and nursing note reviewed  Constitutional:       General: He is not in acute distress  Appearance: He is well-developed  HENT:      Head: Normocephalic and atraumatic  Mouth/Throat:      Mouth: Mucous membranes are moist    Eyes:      Conjunctiva/sclera: Conjunctivae normal       Pupils: Pupils are equal, round, and reactive to light  Cardiovascular:      Rate and Rhythm: Normal rate and regular rhythm  Pulses: Normal pulses  Heart sounds: Normal heart sounds  No murmur heard  Pulmonary:      Effort: Pulmonary effort is normal  No respiratory distress  Breath sounds: Normal breath sounds  Abdominal:      General: Bowel sounds are normal  There is distension (gas distention)  Palpations: Abdomen is soft  Tenderness: There is no abdominal tenderness  Musculoskeletal:         General: No swelling  Cervical back: Neck supple  Right lower leg: No edema  Left lower leg: No edema  Skin:     General: Skin is warm and dry  Capillary Refill: Capillary refill takes less than 2 seconds  Neurological:      General: No focal deficit present  Mental Status: He is alert  Psychiatric:         Mood and Affect: Mood normal          Thought Content:  Thought content normal           Additional Data:     Labs:  Results from last 7 days   Lab Units 12/01/22  0459   WBC Thousand/uL 6 93   HEMOGLOBIN g/dL 10 8*   HEMATOCRIT % 33 2*   PLATELETS Thousands/uL 172   NEUTROS PCT % 60   LYMPHS PCT % 24 MONOS PCT % 11   EOS PCT % 4     Results from last 7 days   Lab Units 12/01/22  0459 11/29/22  0553 11/28/22  1519   SODIUM mmol/L 136   < > 136   POTASSIUM mmol/L 5 2   < > 5 0   CHLORIDE mmol/L 101   < > 102   CO2 mmol/L 29   < > 23   BUN mg/dL 30*   < > 27*   CREATININE mg/dL 2 16*   < > 1 85*   ANION GAP mmol/L 6   < > 11   CALCIUM mg/dL 8 7   < > 8 8   ALBUMIN g/dL  --   --  3 7   TOTAL BILIRUBIN mg/dL  --   --  0 44   ALK PHOS U/L  --   --  73   ALT U/L  --   --  27   AST U/L  --   --  41   GLUCOSE RANDOM mg/dL 171*   < > 155*    < > = values in this interval not displayed  Results from last 7 days   Lab Units 11/28/22  1519   INR  1 04     Results from last 7 days   Lab Units 12/01/22  1122 12/01/22  0754 11/30/22  2046 11/30/22  1600 11/30/22  1141 11/30/22  1056 11/30/22  0642 11/29/22  2107 11/29/22  1635 11/29/22  1047 11/29/22  0754 11/28/22  2250   POC GLUCOSE mg/dl 142* 139 139 140 122 108 122 164* 87 159* 121 151*         Results from last 7 days   Lab Units 11/28/22  1519   LACTIC ACID mmol/L 1 3   PROCALCITONIN ng/ml 0 10       Lines/Drains:  Invasive Devices     Peripheral Intravenous Line  Duration           Peripheral IV 11/28/22 Right;Ventral (anterior) Hand 3 days                      Imaging: No pertinent imaging reviewed  Recent Cultures (last 7 days):   Results from last 7 days   Lab Units 11/30/22  1312 11/28/22  1519   BLOOD CULTURE   --  No Growth at 48 hrs  No Growth at 48 hrs     GRAM STAIN RESULT  No Polys or Bacteria seen  --    WOUND CULTURE  No growth  --        Last 24 Hours Medication List:   Current Facility-Administered Medications   Medication Dose Route Frequency Provider Last Rate   • acetaminophen  650 mg Oral Q6H PRN Santiago Jeronimo DPM     • albuterol  2 puff Inhalation Q4H PRN Santiago Jeronimo DPDOTTIE     • allopurinol  100 mg Oral Daily Santiago Axe, DPM     • atorvastatin  20 mg Oral Daily With Dinner Santiago Axe, DPM     • bisacodyl  5 mg Oral Daily PRN Aleksandra Chaves DESTINEE Varela     • cefazolin  2,000 mg Intravenous Q8H Belen Osullivan DPM 2,000 mg (12/01/22 2209)   • docusate sodium  100 mg Oral BID DESTINEE Merritt     • ferrous sulfate  325 mg Oral Daily With Breakfast Belen Osullivan, COTY     • fluticasone  1 puff Inhalation Q12H [address] Belen Osullivan, DPM     • heparin (porcine)  5,000 Units Subcutaneous [facility] MONICA AshrafM     • insulin lispro  2-12 Units Subcutaneous TID AC Belen Osullivan, DPM     • insulin lispro  2-12 Units Subcutaneous HS Belen Osullivan, DPM     • levothyroxine  150 mcg Oral Early Morning Belen Osullivan DPDOTTIE     • losartan  25 mg Oral Daily DESTINEE Merritt     • metoprolol succinate  100 mg Oral Daily Belen Osullivan, DPM     • ondansetron  4 mg Intravenous Q6H PRN Belen Osullivan, DPM     • oxyCODONE  5 mg Oral Q6H PRN Belen Osullivan DPM     • pantoprazole  40 mg Oral Early Morning Belen Osullivan DPM     • polyethylene glycol  17 g Oral Daily DESTINEE Merritt          Today, Patient Was Seen By: DESTINEE Merritt    **Please Note: This note may have been constructed using a voice recognition system  **

## 2022-12-02 VITALS
WEIGHT: 238 LBS | TEMPERATURE: 97.9 F | RESPIRATION RATE: 16 BRPM | HEIGHT: 70 IN | BODY MASS INDEX: 34.07 KG/M2 | SYSTOLIC BLOOD PRESSURE: 130 MMHG | DIASTOLIC BLOOD PRESSURE: 76 MMHG | OXYGEN SATURATION: 98 % | HEART RATE: 54 BPM

## 2022-12-02 PROBLEM — E87.5 HYPERKALEMIA: Status: ACTIVE | Noted: 2022-12-02

## 2022-12-02 LAB
ANION GAP SERPL CALCULATED.3IONS-SCNC: 3 MMOL/L (ref 4–13)
ANION GAP SERPL CALCULATED.3IONS-SCNC: 8 MMOL/L (ref 4–13)
BUN SERPL-MCNC: 28 MG/DL (ref 5–25)
BUN SERPL-MCNC: 29 MG/DL (ref 5–25)
CALCIUM SERPL-MCNC: 8.9 MG/DL (ref 8.3–10.1)
CALCIUM SERPL-MCNC: 9.2 MG/DL (ref 8.3–10.1)
CHLORIDE SERPL-SCNC: 101 MMOL/L (ref 96–108)
CHLORIDE SERPL-SCNC: 99 MMOL/L (ref 96–108)
CO2 SERPL-SCNC: 29 MMOL/L (ref 21–32)
CO2 SERPL-SCNC: 29 MMOL/L (ref 21–32)
CREAT SERPL-MCNC: 2.19 MG/DL (ref 0.6–1.3)
CREAT SERPL-MCNC: 2.28 MG/DL (ref 0.6–1.3)
ERYTHROCYTE [DISTWIDTH] IN BLOOD BY AUTOMATED COUNT: 12.9 % (ref 11.6–15.1)
GFR SERPL CREATININE-BSD FRML MDRD: 29 ML/MIN/1.73SQ M
GFR SERPL CREATININE-BSD FRML MDRD: 30 ML/MIN/1.73SQ M
GLUCOSE SERPL-MCNC: 110 MG/DL (ref 65–140)
GLUCOSE SERPL-MCNC: 117 MG/DL (ref 65–140)
GLUCOSE SERPL-MCNC: 136 MG/DL (ref 65–140)
GLUCOSE SERPL-MCNC: 138 MG/DL (ref 65–140)
GLUCOSE SERPL-MCNC: 92 MG/DL (ref 65–140)
HCT VFR BLD AUTO: 34.6 % (ref 36.5–49.3)
HGB BLD-MCNC: 11.3 G/DL (ref 12–17)
MCH RBC QN AUTO: 33.9 PG (ref 26.8–34.3)
MCHC RBC AUTO-ENTMCNC: 32.7 G/DL (ref 31.4–37.4)
MCV RBC AUTO: 104 FL (ref 82–98)
PLATELET # BLD AUTO: 194 THOUSANDS/UL (ref 149–390)
PMV BLD AUTO: 9.9 FL (ref 8.9–12.7)
POTASSIUM SERPL-SCNC: 4.6 MMOL/L (ref 3.5–5.3)
POTASSIUM SERPL-SCNC: 5.7 MMOL/L (ref 3.5–5.3)
RBC # BLD AUTO: 3.33 MILLION/UL (ref 3.88–5.62)
SODIUM SERPL-SCNC: 133 MMOL/L (ref 135–147)
SODIUM SERPL-SCNC: 136 MMOL/L (ref 135–147)
WBC # BLD AUTO: 7.18 THOUSAND/UL (ref 4.31–10.16)

## 2022-12-02 RX ORDER — CEPHALEXIN 500 MG/1
500 CAPSULE ORAL EVERY 6 HOURS SCHEDULED
Qty: 40 CAPSULE | Refills: 0 | Status: SHIPPED | OUTPATIENT
Start: 2022-12-03 | End: 2022-12-08 | Stop reason: SDUPTHER

## 2022-12-02 RX ORDER — SODIUM POLYSTYRENE SULFONATE 4.1 MEQ/G
30 POWDER, FOR SUSPENSION ORAL; RECTAL ONCE
Status: COMPLETED | OUTPATIENT
Start: 2022-12-02 | End: 2022-12-02

## 2022-12-02 RX ADMIN — CEFAZOLIN SODIUM 2000 MG: 2 SOLUTION INTRAVENOUS at 14:53

## 2022-12-02 RX ADMIN — LOSARTAN POTASSIUM 25 MG: 25 TABLET, FILM COATED ORAL at 10:34

## 2022-12-02 RX ADMIN — PANTOPRAZOLE SODIUM 40 MG: 40 TABLET, DELAYED RELEASE ORAL at 06:26

## 2022-12-02 RX ADMIN — ATORVASTATIN CALCIUM 20 MG: 20 TABLET, FILM COATED ORAL at 17:13

## 2022-12-02 RX ADMIN — HEPARIN SODIUM 5000 UNITS: 5000 INJECTION INTRAVENOUS; SUBCUTANEOUS at 14:52

## 2022-12-02 RX ADMIN — CEFAZOLIN SODIUM 2000 MG: 2 SOLUTION INTRAVENOUS at 06:26

## 2022-12-02 RX ADMIN — HEPARIN SODIUM 5000 UNITS: 5000 INJECTION INTRAVENOUS; SUBCUTANEOUS at 06:26

## 2022-12-02 RX ADMIN — POLYETHYLENE GLYCOL 3350 17 G: 17 POWDER, FOR SOLUTION ORAL at 10:33

## 2022-12-02 RX ADMIN — SODIUM POLYSTYRENE SULFONATE 30 G: 1 POWDER ORAL; RECTAL at 15:14

## 2022-12-02 RX ADMIN — METOPROLOL SUCCINATE 100 MG: 100 TABLET, EXTENDED RELEASE ORAL at 10:34

## 2022-12-02 RX ADMIN — LEVOTHYROXINE SODIUM 150 MCG: 150 TABLET ORAL at 06:26

## 2022-12-02 RX ADMIN — ALLOPURINOL 100 MG: 100 TABLET ORAL at 10:34

## 2022-12-02 RX ADMIN — DOCUSATE SODIUM 100 MG: 100 CAPSULE ORAL at 17:13

## 2022-12-02 RX ADMIN — DOCUSATE SODIUM 100 MG: 100 CAPSULE ORAL at 10:34

## 2022-12-02 RX ADMIN — BISACODYL 5 MG: 5 TABLET, COATED ORAL at 06:34

## 2022-12-02 NOTE — ASSESSMENT & PLAN NOTE
· k 5 7 - 30g kayexalate   · Repeat BMP 1700  · Reports has been eating a lot of prunes with constipation  · Hold losartan adverse effect of hyperkalemia- monitor OP  ·

## 2022-12-02 NOTE — ASSESSMENT & PLAN NOTE
Presented to ED with right foot wound with increasing redness and swelling to the foot with increased pain and now drainage of pus  • Outpatient follows with Podiatry - podiatry recommending admission for MRI due to concern for osteomyelitis    • Imaging- x-ray of right foot without signs for osteomyelitis  • MRI - this ulceration plantar surface of forefoot at the level of the 3rd MTP joint again seen with surrounding cellulitis, marrow edema within the 3rd metatarsal head and neck and proximal phalanx and 3rd middle phalanx adjacent to the plantar ulcer, no confluent decreased T1 marrow signal seen to definitively suggest osteomyelitis but given marrow edema in the adjacent ulcer, cannot exclude the possibility of early osteomyelitis  • WBC wnl  • Procal pending  • Temp afebrile  • BC : no growth 72hr  • Was started on vancomycin and Rocephin in the ED; will switch to Ancef  • Follow CBC and fever curve  • Podiatry consulted - biopsy 11/30 with podiatry- wound culture and anaerobic culture with no bacterial growth  o Discussed with Podiatry discharge Keflex times 10 days  o Follow-up with Wound Care and Podiatry office

## 2022-12-02 NOTE — ASSESSMENT & PLAN NOTE
Lab Results   Component Value Date    EGFR 29 12/02/2022    EGFR 31 12/01/2022    EGFR 33 11/30/2022    CREATININE 2 28 (H) 12/02/2022    CREATININE 2 16 (H) 12/01/2022    CREATININE 2 04 (H) 11/30/2022     • Review of care everywhere, patient has CKD with creatinine > 2 since 2020  • Creatinine at baseline on admission- history CKD 4  • Referred to Nephrology by PCP but declined  • Renally dose medications, avoid hypotension, avoid nephrotoxins  • Remains at baseline

## 2022-12-02 NOTE — ASSESSMENT & PLAN NOTE
Lab Results   Component Value Date    HGBA1C 6 5 (H) 08/17/2022       Recent Labs     12/01/22  1702 12/01/22  2109 12/02/22  0731 12/02/22  1058   POCGLU 114 115 136 117       Blood Sugar Average: Last 72 hrs:  • (P) 375 [id number] Glipizide on hold with CKD 4  •  sliding scale insulin coverage with Accu-Cheks  • Carbohydrate controlled diet  • Hypoglycemia protocol  • controlled

## 2022-12-02 NOTE — ASSESSMENT & PLAN NOTE
• Continue PTA metoprolol succinate 100 mg daily and losartan 25 mg daily   • Trend blood pressures  • Controlled on current regimen, avoid hypotension  • Hold losartan w/hyperkalemia - may consider changing medication if reoccurs

## 2022-12-02 NOTE — PROGRESS NOTES
Progress Note - Podiatry  Charline Baptise 57 y o  male MRN: [id number]  Unit/Bed#: -01 Encounter: [id number]    Assessment:  1  Right submet 3 head diabetic ulceration with cellulitis & suspicion for OM  - MRI 11/29/22 with marrow edema to 3rd metatarsal head and neck, prox phal and middle phal 3rd toe w/o decreased T1 marrow signal seen to definitely diag OM however cannot exclude early OM  - S/p bone biopsy of 3rd metatarsal head (path, micro) for further investigation (11/30/22)  2  DM (A1c 6 5% 8/17/22)  3  CKD 4    Plan:  - Right 3rd metatarsal head bone biopsy showing no bacterial growth thus far  Pathology still pending  I discussed options with patient and SO in detail - given that MRI is without evidence of gross OM, only mildly elevated intial ESR/CRP and bone cultures have no growth, he should be stable for d/c w/ po abx and outpt follow-up to review pathology results  However, he is to report to ED if foot becomes acutely infected again  He is to follow-up with me next week at wound care  - VSS, no leukocytosis  - Rest of care, IV abx per primary team     WBS: WBAT sx shoe    Subjective/Objective   Chief Complaint:   Chief Complaint   Patient presents with   • Wound Check     Pt reports R foot wound that is being followed by surgery and wound care  Reports increased pain and drainage from wound last night        Subjective: 57 y o  y/o male was seen and evaluated at bedside  Blood pressure 155/83, pulse 60, temperature 98 1 °F (36 7 °C), resp  rate 18, height 5' 10" (1 778 m), weight 108 kg (238 lb), SpO2 95 %  ,Body mass index is 34 15 kg/m²  Invasive Devices     Peripheral Intravenous Line  Duration           Peripheral IV 11/28/22 Right;Ventral (anterior) Hand 3 days                Physical Exam:   General: Alert, cooperative and no distress  Lungs: Non labored breathing  Heart: Positive S1, S2  Abdomen: Soft, non-tender  Extremity: gross msk and nvs baseline   Right foot submet 3 head appears baseline  Incision to dorsal 3rd MTPJ appears well coapted  No acute SOI  Lab, Imaging and other studies:   CBC:   Lab Results   Component Value Date    WBC 7 18 12/02/2022    HGB 11 3 (L) 12/02/2022    HCT 34 6 (L) 12/02/2022     (H) 12/02/2022     12/02/2022    MCH 33 9 12/02/2022    MCHC 32 7 12/02/2022    RDW 12 9 12/02/2022    MPV 9 9 12/02/2022   , CMP:   Lab Results   Component Value Date    SODIUM 133 (L) 12/02/2022    K 5 7 (H) 12/02/2022     12/02/2022    CO2 29 12/02/2022    BUN 29 (H) 12/02/2022    CREATININE 2 28 (H) 12/02/2022    CALCIUM 8 9 12/02/2022    EGFR 29 12/02/2022       Imaging: I have personally reviewed pertinent films in PACS  EKG, Pathology, and Other Studies: I have personally reviewed pertinent reports

## 2022-12-02 NOTE — DISCHARGE SUMMARY
[address]  Discharge- Roise Files 1960, 57 y o  male MRN: [id number]  Unit/Bed#: MS [facility] Encounter: [id number]  Primary Care Provider: Josiane Pathak MD   Date and time admitted to hospital: 11/28/2022  2:34 PM    * Cellulitis of right toe  Assessment & Plan  Presented to ED with right foot wound with increasing redness and swelling to the foot with increased pain and now drainage of pus  • Outpatient follows with Podiatry - podiatry recommending admission for MRI due to concern for osteomyelitis    • Imaging- x-ray of right foot without signs for osteomyelitis  • MRI - this ulceration plantar surface of forefoot at the level of the 3rd MTP joint again seen with surrounding cellulitis, marrow edema within the 3rd metatarsal head and neck and proximal phalanx and 3rd middle phalanx adjacent to the plantar ulcer, no confluent decreased T1 marrow signal seen to definitively suggest osteomyelitis but given marrow edema in the adjacent ulcer, cannot exclude the possibility of early osteomyelitis  • WBC wnl  • Procal pending  • Temp afebrile  • BC : no growth 72hr  • Was started on vancomycin and Rocephin in the ED; will switch to Ancef  • Follow CBC and fever curve  • Podiatry consulted - biopsy 11/30 with podiatry- wound culture and anaerobic culture with no bacterial growth  o Discussed with Podiatry discharge Keflex times 10 days  o Follow-up with Wound Care and Podiatry office      Hyperkalemia  Assessment & Plan  · k 5 7 - 30g kayexalate   · Repeat BMP 1700  · Reports has been eating a lot of prunes with constipation  · Hold losartan adverse effect of hyperkalemia- monitor OP  ·     Type 2 diabetes mellitus with circulatory disorder, without long-term current use of insulin [facility])  Assessment & Plan  Lab Results   Component Value Date    HGBA1C 6 5 (H) 08/17/2022       Recent Labs     12/01/22  1702 12/01/22  2109 12/02/22  0731 12/02/22  1058   POCGLU 114 115 136 117       Blood Sugar Average: Last 72 hrs:  • (P) 296 [id number] Glipizide on hold with CKD 4  •  sliding scale insulin coverage with Accu-Cheks  • Carbohydrate controlled diet  • Hypoglycemia protocol  • controlled      Chronic anemia  Assessment & Plan  • Chronic anemia  • Hemoglobin baseline appears to be around 9-10 recently - Probable secondary to CKD 4  • Monitor closely    Essential hypertension  Assessment & Plan  • Continue PTA metoprolol succinate 100 mg daily and losartan 25 mg daily   • Trend blood pressures  • Controlled on current regimen, avoid hypotension  • Hold losartan w/hyperkalemia - may consider changing medication if reoccurs    Stage 4 chronic kidney disease [facility])  Assessment & Plan  Lab Results   Component Value Date    EGFR 29 12/02/2022    EGFR 31 12/01/2022    EGFR 33 11/30/2022    CREATININE 2 28 (H) 12/02/2022    CREATININE 2 16 (H) 12/01/2022    CREATININE 2 04 (H) 11/30/2022     • Review of care everywhere, patient has CKD with creatinine > 2 since 2020  • Creatinine at baseline on admission- history CKD 4  • Referred to Nephrology by PCP but declined  • Renally dose medications, avoid hypotension, avoid nephrotoxins  • Remains at baseline    Acquired hypothyroidism  Assessment & Plan  · Continue levothyroxine      Medical Problems     Resolved Problems  Date Reviewed: 12/1/2022   None       Discharging Physician / Practitioner: DESTINEE Mckay  PCP: Ruchi Nguyen MD  Admission Date:   Admission Orders (From admission, onward)     Ordered        11/28/22 [address]  Once                      Discharge Date: 12/02/22    Consultations During Hospital Stay:  · Podiatry  · Wound care    Procedures Performed:   · 11/30-3rd metatarsal head bone biopsy  · X-ray foot right-no acute osseous abnormality or acute finding of osteomyelitis  · MRI right foot-  · 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  · Ulceration plantar surface of fortified level of 3rd MTP joint with surrounding cellulitis  · Marrow edema within 3rd metatarsal head and neck and 3rd proximal phalanx and 3rd middle phalanx adjacent to plantar ulcer  No decreased T1 marrow signal to definitely suggest osteomyelitis, cannot be excluded    Significant Findings / Test Results:   · CRP 15 9, sed rate 62  · Procalcitonin 0 1, lactic acid 1 3  · Potassium 5 7  · Blood cultures no growth at 72 hours  · Wound culture no growth  · Anaerobic culture no growth    Incidental Findings:   · None      Test Results Pending at Discharge (will require follow up):   · 11/30 tissue exam- Bone biopsy     Outpatient Tests Requested:  · bmp    Complications:  none    Reason for Admission:  Cellulitis of right toe    Hospital Course:   Rakesh Pena is a 57 y o  male patient who originally presented to the hospital on 11/28/2022 due to right foot wound with increased redness, swelling purulence drainage  Evaluated by Podiatry taken to the OR for bone biopsy  Other imaging as above  Initially placed on vancomycin and Rocephin in the ER and transition to IV Ancef  At time of discharge discussed with podiatrist negative blood cultures, tissue and anaerobic cultures would like to transition to p o  Keflex times 10 days  Podiatrist to follow-up with bone biopsy results  Continue outpatient follow-up at wound clinic with Podiatry  CKD stage 4 creatinine greater than 2 since 2020 per outpatient record diffuse has remained between 2 and 2 3 during admission, recommend follow-up with Nephrology outpatient for monitoring  Noted to have declined nephrology follow-up from PCP  Hyperkalemia treated with Kayexalate  Patient had been consuming large amounts of Rubio due to constipation provided bowel regimen  Consider changing losartan to alternative medication if hyperkalemia persists outpatient  Repeat BMP next week      Please see above list of diagnoses and related plan for additional information       Condition at Discharge: stable    Discharge Day Visit / Exam:   Subjective:    Vitals: Blood Pressure: 155/83 (12/02/22 0709)  Pulse: 60 (12/02/22 0709)  Temperature: 98 1 °F (36 7 °C) (12/02/22 0709)  Temp Source: Oral (11/30/22 1117)  Respirations: 18 (12/02/22 0709)  Height: 5' 10" (177 8 cm) (11/30/22 1148)  Weight - Scale: 108 kg (238 lb) (11/30/22 1117)  SpO2: 97 % (12/02/22 0739)  Exam:   Physical Exam  Vitals and nursing note reviewed  Constitutional:       General: He is not in acute distress  Appearance: He is well-developed  HENT:      Head: Normocephalic and atraumatic  Mouth/Throat:      Mouth: Mucous membranes are moist    Eyes:      Conjunctiva/sclera: Conjunctivae normal       Pupils: Pupils are equal, round, and reactive to light  Cardiovascular:      Rate and Rhythm: Normal rate and regular rhythm  Pulses: Normal pulses  Heart sounds: Normal heart sounds  No murmur heard  Pulmonary:      Effort: Pulmonary effort is normal  No respiratory distress  Breath sounds: Normal breath sounds  Abdominal:      General: Bowel sounds are normal  There is no distension  Palpations: Abdomen is soft  Tenderness: There is no abdominal tenderness  Musculoskeletal:         General: No swelling  Cervical back: Neck supple  Feet:    Feet:      Right foot:      Skin integrity: Erythema (improved) present  Left foot:      Skin integrity: Left foot erythema: improved  Skin:     General: Skin is warm and dry  Capillary Refill: Capillary refill takes less than 2 seconds  Neurological:      Mental Status: He is alert  Psychiatric:         Mood and Affect: Mood normal          Discussion with Family: Updated  (wife) at bedside  Discharge instructions/Information to patient and family:   See after visit summary for information provided to patient and family        Provisions for Follow-Up Care:  See after visit summary for information related to follow-up care and any pertinent home health orders  Disposition:   Home    Planned Readmission: none     Discharge Statement:  I spent 45 minutes discharging the patient  This time was spent on the day of discharge  I had direct contact with the patient on the day of discharge  Greater than 50% of the total time was spent examining patient, answering all patient questions, arranging and discussing plan of care with patient as well as directly providing post-discharge instructions  Additional time then spent on discharge activities  Discharge Medications:  See after visit summary for reconciled discharge medications provided to patient and/or family        **Please Note: This note may have been constructed using a voice recognition system**

## 2022-12-02 NOTE — PROGRESS NOTES
Pt reports typically has a good appetite, decreased at breakfast today/did not eat much due to vomiting this AM  Pt says he is constipated, drank 3, 8oz cups of prune juice and ate 5-6 prunes yesterday = ~1800 mg K alone  Noting K level trending up, now elevated to 5 7, hx of CKD 4  Will order 2gm K restriction as pt would like to continue with prunes/prune juice to promote BM, had small hard BM this AM otherwise last one on 11/28  Continue CCD 2 as ordered

## 2022-12-03 LAB
BACTERIA BLD CULT: NORMAL
BACTERIA BLD CULT: NORMAL
BACTERIA SPEC ANAEROBE CULT: NO GROWTH
BACTERIA WND AEROBE CULT: NO GROWTH
GRAM STN SPEC: NORMAL

## 2022-12-05 NOTE — UTILIZATION REVIEW
NOTIFICATION OF ADMISSION DISCHARGE   This is a Notification of Discharge from [facility]  Please be advised that this patient has been discharge from our facility  Below you will find the admission and discharge date and time including the patient’s disposition  UTILIZATION REVIEW CONTACT:  [address] [place]  Utilization   Network Utilization Review Department  Phone: [phone] x carefully listen to the prompts  All voicemails are confidential   Email: [email or web address]     ADMISSION INFORMATION  PRESENTATION DATE: 11/28/2022  2:34 PM  OBERVATION ADMISSION DATE:   INPATIENT ADMISSION DATE: 11/28/22  4:42 PM   DISCHARGE DATE: 12/2/2022  6:15 PM   DISPOSITION:Home/Self Care    IMPORTANT INFORMATION:  Send all requests for admission clinical reviews, approved or denied determinations and any other requests to dedicated fax number below belonging to the campus where the patient is receiving treatment   List of dedicated fax numbers:  [address] DENIALS (Administrative/Medical Necessity) [phone]   [address] (Maternity/NICU/Pediatrics) [phone]   [facility] [phone]   [place] [address] [phone]   [address] [phone]   [address] [facility] [phone]   [address] [facility] [phone]   [address] [place] [address] 119 [phone]   [facility]  [phone]   [address] [facility] [phone] [address] [facility] [address] [facility] [phone]

## 2022-12-08 ENCOUNTER — OFFICE VISIT (OUTPATIENT)
Dept: WOUND CARE | Facility: CLINIC | Age: 62
End: 2022-12-08

## 2022-12-08 ENCOUNTER — APPOINTMENT (OUTPATIENT)
Dept: LAB | Facility: HOSPITAL | Age: 62
End: 2022-12-08

## 2022-12-08 VITALS
SYSTOLIC BLOOD PRESSURE: 148 MMHG | RESPIRATION RATE: 18 BRPM | DIASTOLIC BLOOD PRESSURE: 78 MMHG | TEMPERATURE: 98.4 F | HEART RATE: 76 BPM

## 2022-12-08 DIAGNOSIS — E08.621 DIABETIC ULCER OF RIGHT MIDFOOT ASSOCIATED WITH DIABETES MELLITUS DUE TO UNDERLYING CONDITION, WITH FAT LAYER EXPOSED (HCC): Primary | ICD-10-CM

## 2022-12-08 DIAGNOSIS — Z89.421 STATUS POST AMPUTATION OF LESSER TOE OF RIGHT FOOT (HCC): ICD-10-CM

## 2022-12-08 DIAGNOSIS — L97.412 DIABETIC ULCER OF RIGHT MIDFOOT ASSOCIATED WITH DIABETES MELLITUS DUE TO UNDERLYING CONDITION, WITH FAT LAYER EXPOSED (HCC): Primary | ICD-10-CM

## 2022-12-08 DIAGNOSIS — N18.4 STAGE 4 CHRONIC KIDNEY DISEASE (HCC): ICD-10-CM

## 2022-12-08 DIAGNOSIS — E87.5 HYPERKALEMIA: ICD-10-CM

## 2022-12-08 DIAGNOSIS — L03.115 CELLULITIS OF RIGHT LOWER EXTREMITY: ICD-10-CM

## 2022-12-08 LAB
ANION GAP SERPL CALCULATED.3IONS-SCNC: 8 MMOL/L (ref 4–13)
BUN SERPL-MCNC: 33 MG/DL (ref 5–25)
CALCIUM SERPL-MCNC: 9.4 MG/DL (ref 8.3–10.1)
CHLORIDE SERPL-SCNC: 105 MMOL/L (ref 96–108)
CO2 SERPL-SCNC: 28 MMOL/L (ref 21–32)
CREAT SERPL-MCNC: 2.09 MG/DL (ref 0.6–1.3)
GFR SERPL CREATININE-BSD FRML MDRD: 32 ML/MIN/1.73SQ M
GLUCOSE P FAST SERPL-MCNC: 139 MG/DL (ref 65–99)
POTASSIUM SERPL-SCNC: 4.6 MMOL/L (ref 3.5–5.3)
SODIUM SERPL-SCNC: 141 MMOL/L (ref 135–147)

## 2022-12-08 RX ORDER — LIDOCAINE 40 MG/G
CREAM TOPICAL ONCE
Status: COMPLETED | OUTPATIENT
Start: 2022-12-08 | End: 2022-12-08

## 2022-12-08 RX ORDER — CEPHALEXIN 500 MG/1
500 CAPSULE ORAL EVERY 6 HOURS SCHEDULED
Qty: 40 CAPSULE | Refills: 0 | Status: ON HOLD | OUTPATIENT
Start: 2022-12-08 | End: 2022-12-16 | Stop reason: SDUPTHER

## 2022-12-08 RX ADMIN — LIDOCAINE: 40 CREAM TOPICAL at 14:42

## 2022-12-08 NOTE — PATIENT INSTRUCTIONS
Orders Placed This Encounter   Procedures    Wound cleansing and dressings     Wound cleansing and dressings       Wound cleansing and dressings                            Wash your hands with soap and water  Remove old dressing, discard into plastic bag and place in trash  Cleanse the wound with normal saline prior to applying a clean dressing  Do not use tissue or cotton balls  Do not scrub the wound  Pat dry using gauze  Shower yes (COVER RIGHT FOOT/LOWER LEG WITH CAST COVER)     Bottom of right foot:   Apply Purocol AG to the wound  Cover with dermagran, then gauze  Secure with clara  Change dressing every 3 days and as needed if soiled or lose integrity  Top of right foot  Cover incision with  dry dressing and secure with rolled gauze and change every three days     Limit walking on right foot  Really try to stay off the foot as much as possible    If you keep all pressure off the front of that foot it will help it heal faster    You need to make sure that you moisturize your left foot to help soften the callus so it does not get irritated by the sock     Doctor is going to send radha new script for antibiotics    Think about the surgery options that you were given and contact Dr Sherlyn Talamantes office at [phone]    Follow up in one week     Standing Status:   Future     Standing Expiration Date:   12/8/2023

## 2022-12-08 NOTE — PROGRESS NOTES
ADDENDUM 12/9/22: Patient would like to go through with surgery as below  I was very clear at the beginning of the discussion about alternatives to this surgery including  LWC, and second surgical opinions  I spent time to discuss with the patient the surgical procedures of right foot distal 3rd ray resection and GSR, pre-op testing, and post-op course (NWB 1-2 weeks then WBAT tall CAM boot) required to properly heal the surgery  I discussed risks as infection, scar, swelling, chronic pain,  poor healing of incision or bone that could require more surgery, incomplete correction of deformities or recurrence of deformities, change in shape of foot, toe, or walking and function, numbness, neuritis/RSD, blood clots in the leg or lung, and even death from anesthesia complications  No guarantees were given and the possibility of recurrent deformity or incomplete correction were discussed before patient signed the consent form  We also discussed the need for possible anticoagulation (aspirin)  The offloading device necessary after the surgery will be CAM and crutches  The surgery, history and physical and PATS will be scheduled  Patient ID: Carolina Yen is a 57 y o  male Date of Birth 1960       Chief Complaint   Patient presents with   • Follow Up Wound Care Visit     Right foot wound follow up       Allergies:  Aspirin, Other, Felodipine, Lisinopril, Niacin, Nsaids, and Simvastatin    Diagnosis:  1  Diabetic ulcer of right midfoot associated with diabetes mellitus due to underlying condition, with fat layer exposed ([address] )  -     lidocaine (LMX) 4 % cream  -     Wound cleansing and dressings; Future    2  Status post amputation of lesser toe of right foot (HCC)  -     lidocaine (LMX) 4 % cream  -     Wound cleansing and dressings; Future    3  Cellulitis of right lower extremity  -     cephalexin (KEFLEX) 500 mg capsule;  Take 1 capsule (500 mg total) by mouth every 6 (six) hours for 10 days       Diagnosis ICD-10-CM Associated Orders   1  Diabetic ulcer of right midfoot associated with diabetes mellitus due to underlying condition, with fat layer exposed ([facility] )  E08 621 lidocaine (LMX) 4 % cream    L97 412 Wound cleansing and dressings      2  Status post amputation of lesser toe of right foot ([facility])  Z89 421 lidocaine (LMX) 4 % cream     Wound cleansing and dressings      3  Cellulitis of right lower extremity  L03 115 cephalexin (KEFLEX) 500 mg capsule           Assessment & Plan:  See wound orders    - Right submet 3 head ulceration with findings concerning for chronic OM of distal 3rd ray (see results below)  I discussed treatment options with patient and partner in great detail today  I discussed long term IV abx and LWC vs distal 3rd ray resection and GSR  I discussed risks of long term IV abx including organ damage, failure of treatment to cure OM  I discussed that surgical approach to OM in foot is in theory the more definitive option for cure of OM however with its own risk of poor wound healing, infection, transfer wounds  Patient will discuss options with partner and call me tomorrow  VSS and currently on abx while decision is made  We will likely schedule surgery (Right distal 3rd ray resection and GSR) early next week if this is decided upon  - Rx'd cephalexin to continue until further decision is made  - Left submet 1 head callus/HPK was pared with a #15 blade to normal epithelium  No right foot owund debridement was performed  IMAGES/RESULTS REVIEWED:   - MRI right forefoot 11/29/22: "marrow edema within the 3rd metatarsal head and neck and 3rd proximal phalanx and 3rd middle phalanx adjacent to the plantar ulcer  There is however no confluent decreased T1 marrow signal seen to definitively suggest osteomyelitis but given the marrow edema and the adjacent ulcer, cannot exclude the possibility of early osteomyelitis"  - 11/30/22 bone cultures 3rd metatarsal head negative     - 11/30/22 bone biopsy/pathology of 3rd metatarsal head: chronic OM    Subjective:   Presents for f/u foot wound right foot (present since early August, treated elsewhere)  Was in the hospital last week due to cellulitis  Workup for OM was performed with MRI possible "early OM" of 3rd metatarsal  Bone biopsy and bone culture taken  Bone culture was negative and biopsy resulted recently  Has been on Abx since hospital readmission  Currently redness improved but swelling still present         The following portions of the patient's history were reviewed and updated as appropriate:   Patient Active Problem List   Diagnosis   • Osteomyelitis of foot, right, acute ([address] )   • Sepsis ([facility] [facility] )   • Acquired hypothyroidism   • Stage 4 chronic kidney disease ([facility] [address] )   • Essential hypertension   • Chronic anemia   • Type 2 diabetes mellitus with circulatory disorder, without long-term current use of insulin (HCC)   • GERD (gastroesophageal reflux disease)   • Class 2 obesity in adult   • Cellulitis of right toe   • Hyperkalemia     Past Medical History:   Diagnosis Date   • Chronic kidney failure, stage 2 (mild)    • Diabetes mellitus (HCC)    • GERD (gastroesophageal reflux disease)    • Gout    • Hypertension      Past Surgical History:   Procedure Laterality Date   • ANKLE FRACTURE SURGERY Right    • BONE BIOPSY Right 11/30/2022    Procedure: Right 3rd metatarsal head bone biopsy;  Surgeon: Aleatha Merlin, DPM;  Location:  MAIN OR;  Service: Podiatry   • CHOLECYSTECTOMY     • TOE AMPUTATION Right 11/2/2022    Procedure: RIGHT 2ND TOE PARTIAL AMPUTATION and right foot wound debridement;  Surgeon: Lynda Hess DPM;  Location:  MAIN OR;  Service: Podiatry     Social History     Socioeconomic History   • Marital status: Single     Spouse name: None   • Number of children: None   • Years of education: None   • Highest education level: None   Occupational History   • None   Tobacco Use   • Smoking status: Former     Packs/day: 1 00     Types: Cigarettes   • Smokeless tobacco: Never   Vaping Use   • Vaping Use: Former   Substance and Sexual Activity   • Alcohol use: Not Currently   • Drug use: Never   • Sexual activity: None   Other Topics Concern   • None   Social History Narrative   • None     Social Determinants of Health     Financial Resource Strain: Not on file   Food Insecurity: No Food Insecurity   • Worried About Running Out of Food in the Last Year: Never true   • Ran Out of Food in the Last Year: Never true   Transportation Needs: No Transportation Needs   • Lack of Transportation (Medical): No   • Lack of Transportation (Non-Medical):  No   Physical Activity: Not on file   Stress: Not on file   Social Connections: Not on file   Intimate Partner Violence: Not on file   Housing Stability: Low Risk    • Unable to Pay for Housing in the Last Year: No   • Number of Places Lived in the Last Year: 1   • Unstable Housing in the Last Year: No        Current Outpatient Medications:   •  cephalexin (KEFLEX) 500 mg capsule, Take 1 capsule (500 mg total) by mouth every 6 (six) hours for 10 days, Disp: 40 capsule, Rfl: 0  •  albuterol (PROVENTIL HFA,VENTOLIN HFA) 90 mcg/act inhaler, Inhale 2 puffs every 4 (four) hours as needed, Disp: , Rfl:   •  allopurinol (ZYLOPRIM) 100 mg tablet, Take 100 mg by mouth daily, Disp: , Rfl:   •  aspirin (ECOTRIN LOW STRENGTH) 81 mg EC tablet, Take 81 mg by mouth daily, Disp: , Rfl:   •  atorvastatin (LIPITOR) 20 mg tablet, Take 20 mg by mouth see administration instructions 3x a week, Monday, Wednesday, and Friday, Disp: , Rfl:   •  bisacodyl (DULCOLAX) 5 mg EC tablet, Take 1 tablet (5 mg total) by mouth daily as needed for constipation, Disp: 30 tablet, Rfl: 0  •  co-enzyme Q-10 100 mg capsule, Take by mouth, Disp: , Rfl:   •  ferrous sulfate 324 (65 Fe) mg, Take 1 tablet (324 mg total) by mouth every other day, Disp: 15 tablet, Rfl: 2  •  fluticasone (FLOVENT HFA) 110 MCG/ACT inhaler, Inhale, Disp: , Rfl:   •  glipiZIDE (GLUCOTROL XL) 5 mg 24 hr tablet, TAKE 1 TABLET DAILY 30 MINUTES BEFORE A MEAL (REPLACES 2 5 MG), Disp: , Rfl:   •  levothyroxine 150 mcg tablet, TAKE 1 TABLET DAILY AT LEAST 30 MINUTES PRIOR TO BREAKFAST OR OTHER MEDICATIONS (REPLACES 137 MCG), Disp: , Rfl:   •  losartan (COZAAR) 25 mg tablet, Take 1 tablet (25 mg total) by mouth daily, Disp: 30 tablet, Rfl: 2  •  metoprolol succinate (TOPROL-XL) 100 mg 24 hr tablet, Take 100 mg by mouth daily, Disp: , Rfl:   •  omeprazole (PriLOSEC) 20 mg delayed release capsule, 20 mg daily, Disp: , Rfl:   No current facility-administered medications for this visit  Family History   Problem Relation Age of Onset   • Gout Mother    • Diabetes Father    • Heart disease Father       Review of Systems   Constitutional: Negative for activity change, chills and fever  HENT: Negative  Respiratory: Negative for cough, chest tightness and shortness of breath  Cardiovascular: Negative for chest pain and leg swelling  Endocrine: Negative  Genitourinary: Negative  Musculoskeletal:        S/p right 2nd toe partial amputation   Skin: Positive for wound (Right foot wound)  Neurological: Negative  Negative for numbness  Psychiatric/Behavioral: Negative  Negative for agitation and behavioral problems  Objective:  /78   Pulse 76   Temp 98 4 °F (36 9 °C)   Resp 18     Physical Exam  Cardiovascular:      Comments: Right DP pulses palpable, PT pulses diminished  Toes warm and well perfused  Musculoskeletal:         General: No tenderness  Comments: S/p right 2nd toe partial amputation   Skin:     Findings: Lesion (Right sub met 3 head ulceration (see below for detailes)  No cellulitis, edema, purulence, crepitus, malodor ) present  No erythema  Comments: Healed incision to right 2nd toe partial amputation site  No acute SOI  Dorsal incision 3rd MTPJ well approximated with all sutures intact     Neurological:      Mental Status: He is oriented to person, place, and time  Comments: +peripheral neuropathy  Diminished protective sensation to feet  Wound 11/10/22 Diabetic Ulcer Foot Right;Posterior (Active)   Wound Image   12/08/22 1401   Wound Description Slough;Granulation tissue 12/08/22 1402   Lara-wound Assessment Callus;Dry 12/08/22 1402   Wound Length (cm) 0 4 cm 12/08/22 1402   Wound Width (cm) 0 2 cm 12/08/22 1402   Wound Depth (cm) 0 4 cm 12/08/22 1402   Wound Surface Area (cm^2) 0 08 cm^2 12/08/22 1402   Wound Volume (cm^3) 0 032 cm^3 12/08/22 1402   Calculated Wound Volume (cm^3) 0 03 cm^3 12/08/22 1402   Change in Wound Size % 84 21 12/08/22 1402   Drainage Amount Small 12/08/22 1402   Drainage Description Serous 12/08/22 1402   Non-staged Wound Description Full thickness 12/08/22 1402   Treatments Cleansed 12/08/22 1402       Wound 11/30/22 Foot Right; Anterior (Active)   Wound Description Epithelialization;Granulation tissue 12/08/22 1402   Lara-wound Assessment Clean;Dry; Intact 12/08/22 1402   Wound Length (cm) 1 2 cm 12/08/22 1402   Wound Width (cm) 0 1 cm 12/08/22 1402   Wound Depth (cm) 0 1 cm 12/08/22 1402   Wound Surface Area (cm^2) 0 12 cm^2 12/08/22 1402   Wound Volume (cm^3) 0 012 cm^3 12/08/22 1402   Calculated Wound Volume (cm^3) 0 01 cm^3 12/08/22 1402   Wound Site Closure Sutures 12/08/22 1402   Drainage Amount None 12/08/22 1402   Non-staged Wound Description Not applicable 25/46/31 4879         Results from last 6 Months   Lab Units 11/30/22  1312   WOUND CULTURE  No growth         Wound Instructions:  Orders Placed This Encounter   Procedures   • Wound cleansing and dressings     Wound cleansing and dressings       Wound cleansing and dressings                            Wash your hands with soap and water  Remove old dressing, discard into plastic bag and place in trash  Cleanse the wound with normal saline prior to applying a clean dressing  Do not use tissue or cotton balls  Do not scrub the wound   Pat dry using gauze  Shower yes (COVER RIGHT FOOT/LOWER LEG WITH CAST COVER)     Bottom of right foot:   Apply Purocol AG to the wound  Cover with dermagran, then gauze  Secure with clara  Change dressing every 3 days and as needed if soiled or lose integrity       Top of right foot  Cover incision with  dry dressing and secure with rolled gauze and change every three days     Limit walking on right foot  Really try to stay off the foot as much as possible  If you keep all pressure off the front of that foot it will help it heal faster    You need to make sure that you moisturize your left foot to help soften the callus so it does not get irritated by the sock     Doctor is going to send radha new script for antibiotics    Think about the surgery options that you were given and contact Dr Dora Cast office at [phone]    Follow up in one week     Standing Status:   Future     Standing Expiration Date:   12/8/2023         Steven White DPM      Portions of the record may have been created with voice recognition software  Occasional wrong word or "sound a like" substitutions may have occurred due to the inherent limitations of voice recognition software  Read the chart carefully and recognize, using context, where substitutions have occurred

## 2022-12-09 ENCOUNTER — TELEPHONE (OUTPATIENT)
Dept: PODIATRY | Age: 62
End: 2022-12-09

## 2022-12-09 RX ORDER — CHLORHEXIDINE GLUCONATE 0.12 MG/ML
15 RINSE ORAL ONCE
Status: CANCELLED | OUTPATIENT
Start: 2022-12-09 | End: 2022-12-09

## 2022-12-09 RX ORDER — CHLORHEXIDINE GLUCONATE 4 G/100ML
SOLUTION TOPICAL DAILY PRN
Status: CANCELLED | OUTPATIENT
Start: 2022-12-09

## 2022-12-09 RX ORDER — CEFAZOLIN SODIUM 2 G/50ML
2000 SOLUTION INTRAVENOUS ONCE
Status: CANCELLED | OUTPATIENT
Start: 2022-12-16 | End: 2022-12-09

## 2022-12-09 NOTE — TELEPHONE ENCOUNTER
Caller: Patient    Doctor: Apple Rodrigez    Reason for call: Patient wants to schedule surgery  He wants to go ahead with surgery      Call back#: [phone]

## 2022-12-12 ENCOUNTER — TELEPHONE (OUTPATIENT)
Dept: PAIN MEDICINE | Facility: CLINIC | Age: 62
End: 2022-12-12

## 2022-12-12 ENCOUNTER — ANESTHESIA EVENT (OUTPATIENT)
Dept: PERIOP | Facility: HOSPITAL | Age: 62
End: 2022-12-12

## 2022-12-12 NOTE — TELEPHONE ENCOUNTER
Left message regarding sx on 12/16/2022  I advised patient will need to be medically cleared by the PCP prior to sx  Requested a return call from patient

## 2022-12-13 ENCOUNTER — TELEPHONE (OUTPATIENT)
Dept: PAIN MEDICINE | Facility: CLINIC | Age: 62
End: 2022-12-13

## 2022-12-13 RX ORDER — ACETAMINOPHEN 500 MG
1000 TABLET ORAL EVERY 6 HOURS PRN
COMMUNITY

## 2022-12-13 RX ORDER — MULTIVIT-MIN/IRON FUM/FOLIC AC 7.5 MG-4
1 TABLET ORAL DAILY
COMMUNITY

## 2022-12-13 NOTE — TELEPHONE ENCOUNTER
Patient returned my call and left a message  I returned patient's call and left message for a call back

## 2022-12-13 NOTE — PRE-PROCEDURE INSTRUCTIONS
Pre-Surgery Instructions:   Medication Instructions   • acetaminophen (TYLENOL) 500 mg tablet Uses PRN- OK to take day of surgery   • allopurinol (ZYLOPRIM) 100 mg tablet Take day of surgery  • aspirin (ECOTRIN LOW STRENGTH) 81 mg EC tablet Hold day of surgery  • atorvastatin (LIPITOR) 20 mg tablet Take day of surgery  • cephalexin (KEFLEX) 500 mg capsule Hold day of surgery  • co-enzyme Q-10 100 mg capsule Hold day of surgery  • ferrous sulfate 324 (65 Fe) mg Hold day of surgery  • glipiZIDE (GLUCOTROL XL) 5 mg 24 hr tablet Hold day of surgery  • levothyroxine 150 mcg tablet Take day of surgery  • losartan (COZAAR) 25 mg tablet Take night before surgery   • metoprolol succinate (TOPROL-XL) 100 mg 24 hr tablet Take day of surgery  • Multiple Vitamins-Minerals (multivitamin with minerals) tablet Stop taking 3 days prior to surgery  • omeprazole (PriLOSEC) 20 mg delayed release capsule Take day of surgery     See above    Pt was instructed to take am meds with a small sip of water

## 2022-12-14 ENCOUNTER — ANESTHESIA (OUTPATIENT)
Dept: PERIOP | Facility: HOSPITAL | Age: 62
End: 2022-12-14

## 2022-12-16 ENCOUNTER — APPOINTMENT (OUTPATIENT)
Dept: RADIOLOGY | Facility: HOSPITAL | Age: 62
End: 2022-12-16

## 2022-12-16 ENCOUNTER — HOSPITAL ENCOUNTER (OUTPATIENT)
Facility: HOSPITAL | Age: 62
Setting detail: OUTPATIENT SURGERY
Discharge: HOME | End: 2022-12-16
Attending: STUDENT IN AN ORGANIZED HEALTH CARE EDUCATION/TRAINING PROGRAM | Admitting: STUDENT IN AN ORGANIZED HEALTH CARE EDUCATION/TRAINING PROGRAM

## 2022-12-16 ENCOUNTER — TELEPHONE (OUTPATIENT)
Dept: PODIATRY | Facility: CLINIC | Age: 62
End: 2022-12-16

## 2022-12-16 VITALS
WEIGHT: 243 LBS | DIASTOLIC BLOOD PRESSURE: 81 MMHG | HEIGHT: 70 IN | RESPIRATION RATE: 18 BRPM | SYSTOLIC BLOOD PRESSURE: 130 MMHG | TEMPERATURE: 97.8 F | HEART RATE: 49 BPM | OXYGEN SATURATION: 97 % | BODY MASS INDEX: 34.79 KG/M2

## 2022-12-16 DIAGNOSIS — G89.18 ACUTE POSTOPERATIVE PAIN: Primary | ICD-10-CM

## 2022-12-16 DIAGNOSIS — E08.621 DIABETIC ULCER OF RIGHT MIDFOOT ASSOCIATED WITH DIABETES MELLITUS DUE TO UNDERLYING CONDITION, WITH FAT LAYER EXPOSED (HCC): ICD-10-CM

## 2022-12-16 DIAGNOSIS — L97.412 DIABETIC ULCER OF RIGHT MIDFOOT ASSOCIATED WITH DIABETES MELLITUS DUE TO UNDERLYING CONDITION, WITH FAT LAYER EXPOSED (HCC): ICD-10-CM

## 2022-12-16 DIAGNOSIS — L03.115 CELLULITIS OF RIGHT LOWER EXTREMITY: ICD-10-CM

## 2022-12-16 LAB — GLUCOSE SERPL-MCNC: 114 MG/DL (ref 65–140)

## 2022-12-16 RX ORDER — MAGNESIUM HYDROXIDE 1200 MG/15ML
LIQUID ORAL AS NEEDED
Status: DISCONTINUED | OUTPATIENT
Start: 2022-12-16 | End: 2022-12-16 | Stop reason: HOSPADM

## 2022-12-16 RX ORDER — CHLORHEXIDINE GLUCONATE 0.12 MG/ML
15 RINSE ORAL ONCE
Status: COMPLETED | OUTPATIENT
Start: 2022-12-16 | End: 2022-12-16

## 2022-12-16 RX ORDER — CEPHALEXIN 500 MG/1
500 CAPSULE ORAL EVERY 6 HOURS SCHEDULED
Qty: 40 CAPSULE | Refills: 0 | Status: SHIPPED | OUTPATIENT
Start: 2022-12-16 | End: 2022-12-26

## 2022-12-16 RX ORDER — FENTANYL CITRATE/PF 50 MCG/ML
25 SYRINGE (ML) INJECTION
Status: DISCONTINUED | OUTPATIENT
Start: 2022-12-16 | End: 2022-12-16 | Stop reason: HOSPADM

## 2022-12-16 RX ORDER — FENTANYL CITRATE 50 UG/ML
INJECTION, SOLUTION INTRAMUSCULAR; INTRAVENOUS AS NEEDED
Status: DISCONTINUED | OUTPATIENT
Start: 2022-12-16 | End: 2022-12-16

## 2022-12-16 RX ORDER — LIDOCAINE HYDROCHLORIDE 10 MG/ML
INJECTION, SOLUTION EPIDURAL; INFILTRATION; INTRACAUDAL; PERINEURAL AS NEEDED
Status: DISCONTINUED | OUTPATIENT
Start: 2022-12-16 | End: 2022-12-16

## 2022-12-16 RX ORDER — OXYCODONE HYDROCHLORIDE AND ACETAMINOPHEN 5; 325 MG/1; MG/1
1 TABLET ORAL EVERY 4 HOURS PRN
Qty: 10 TABLET | Refills: 0 | Status: SHIPPED | OUTPATIENT
Start: 2022-12-16

## 2022-12-16 RX ORDER — MIDAZOLAM HYDROCHLORIDE 2 MG/2ML
INJECTION, SOLUTION INTRAMUSCULAR; INTRAVENOUS AS NEEDED
Status: DISCONTINUED | OUTPATIENT
Start: 2022-12-16 | End: 2022-12-16

## 2022-12-16 RX ORDER — CEFAZOLIN SODIUM 2 G/50ML
2000 SOLUTION INTRAVENOUS ONCE
Status: COMPLETED | OUTPATIENT
Start: 2022-12-16 | End: 2022-12-16

## 2022-12-16 RX ORDER — CHLORHEXIDINE GLUCONATE 4 G/100ML
SOLUTION TOPICAL DAILY PRN
Status: DISCONTINUED | OUTPATIENT
Start: 2022-12-16 | End: 2022-12-16 | Stop reason: HOSPADM

## 2022-12-16 RX ORDER — ONDANSETRON 2 MG/ML
4 INJECTION INTRAMUSCULAR; INTRAVENOUS ONCE AS NEEDED
Status: DISCONTINUED | OUTPATIENT
Start: 2022-12-16 | End: 2022-12-16 | Stop reason: HOSPADM

## 2022-12-16 RX ORDER — PROPOFOL 10 MG/ML
INJECTION, EMULSION INTRAVENOUS CONTINUOUS PRN
Status: DISCONTINUED | OUTPATIENT
Start: 2022-12-16 | End: 2022-12-16

## 2022-12-16 RX ORDER — SODIUM CHLORIDE, SODIUM LACTATE, POTASSIUM CHLORIDE, CALCIUM CHLORIDE 600; 310; 30; 20 MG/100ML; MG/100ML; MG/100ML; MG/100ML
INJECTION, SOLUTION INTRAVENOUS CONTINUOUS PRN
Status: DISCONTINUED | OUTPATIENT
Start: 2022-12-16 | End: 2022-12-16

## 2022-12-16 RX ORDER — PROPOFOL 10 MG/ML
INJECTION, EMULSION INTRAVENOUS AS NEEDED
Status: DISCONTINUED | OUTPATIENT
Start: 2022-12-16 | End: 2022-12-16

## 2022-12-16 RX ADMIN — CHLORHEXIDINE GLUCONATE 0.12% ORAL RINSE 15 ML: 1.2 LIQUID ORAL at 10:41

## 2022-12-16 RX ADMIN — MIDAZOLAM 2 MG: 1 INJECTION INTRAMUSCULAR; INTRAVENOUS at 12:04

## 2022-12-16 RX ADMIN — SODIUM CHLORIDE, SODIUM LACTATE, POTASSIUM CHLORIDE, AND CALCIUM CHLORIDE: .6; .31; .03; .02 INJECTION, SOLUTION INTRAVENOUS at 11:03

## 2022-12-16 RX ADMIN — PROPOFOL 140 MCG/KG/MIN: 10 INJECTION, EMULSION INTRAVENOUS at 12:09

## 2022-12-16 RX ADMIN — PROPOFOL 100 MG: 10 INJECTION, EMULSION INTRAVENOUS at 12:09

## 2022-12-16 RX ADMIN — FENTANYL CITRATE 50 MCG: 50 INJECTION INTRAMUSCULAR; INTRAVENOUS at 12:07

## 2022-12-16 RX ADMIN — FENTANYL CITRATE 50 MCG: 50 INJECTION INTRAMUSCULAR; INTRAVENOUS at 12:58

## 2022-12-16 RX ADMIN — CEFAZOLIN SODIUM 2000 MG: 2 SOLUTION INTRAVENOUS at 12:04

## 2022-12-16 RX ADMIN — LIDOCAINE HYDROCHLORIDE 50 MG: 10 INJECTION, SOLUTION EPIDURAL; INFILTRATION; INTRACAUDAL; PERINEURAL at 12:09

## 2022-12-16 NOTE — ANESTHESIA POSTPROCEDURE EVALUATION
Post-Op Assessment Note    CV Status:  Stable  Pain Score: 0    Pain management: adequate     Mental Status:  Alert   Hydration Status:  Stable   PONV Controlled:  None      Post Op Vitals Reviewed: Yes      Staff: Anesthesiologist         No notable events documented      BP      Temp      Pulse    Resp      SpO2

## 2022-12-16 NOTE — INTERVAL H&P NOTE
H&P reviewed  After examining the patient I find no changes in the patients condition since the H&P had been written      Vitals:    12/16/22 1030   BP: 127/74   Pulse: (!) 53   Resp: 18   Temp: 98 2 °F (36 8 °C)   SpO2: 97%

## 2022-12-16 NOTE — DISCHARGE SUMMARY
Discharge Summary Outpatient Procedure Podiatry -   Dominique Vela 57 y o  male MRN: [id number]  Unit/Bed#: OR POOL Encounter: [id number]    Admission Date: 12/16/2022     Admitting Diagnosis: Diabetic ulcer of right midfoot associated with diabetes mellitus due to underlying condition, with fat layer exposed ([facility] ) [I92 584, L97 412]    Discharge Diagnosis: same    Procedures Performed: Partial 3rd RAY excision FOOT, plantar wound excision: 48208 (CPT®)    Complications: none    Condition at Discharge: stable    Discharge instructions/Information to patient and family:   See after visit summary for information provided to patient and family  Provisions for Follow-Up Care/Important appointments:  See after visit summary for information related to follow-up care and any pertinent home health orders  Discharge Medications:  See after visit summary for reconciled discharge medications provided to patient and family

## 2022-12-16 NOTE — PROGRESS NOTES
PRE-Operative Note - Podiatry  Dania Hutton 57 y o  male MRN: [id number]  Unit/Bed#: OR POOL Encounter: [id number]    Assessment:  1  Right submet 3 head diabetic foot ulceration with findings concerning for chronic OM of distal 3rd ray (MRI with early OM and pathology confirming chronic OM)    Plan:  1  Consent signed and in chart: Right partial third ray resection  I again discussed treatment options with patient and partner in great detail today  I discussed long term IV abx and LWC vs distal 3rd ray resection and GSR  I discussed risks of long term IV abx including organ damage, failure of treatment to cure OM  I discussed that surgical approach to OM in foot is in theory the more definitive option for cure of OM however with its own risk of poor wound healing, infection, transfer wounds  I discussed that the gastrocnemius recession is performed to avoid increased pressure on forefoot/transfer ulcerations and that it is not necessary to be performed today  Patient will instead attempt PT to elongate the achilles tendon in conjunction with custom diabetic shoes to offload and IF other wounds occur, GSR will be considered at that time  2  Confirmed NPO status  3  H&P reviewed, no changes  4  Alternatives, risks, and complications discussed with patient  5  All questions answered  No guarantees given to outcome of procedure  6  F/u with me outpt at wound care center      Subjective/Objective   Chief Complaint: No chief complaint on file  Subjective: Seen resting in pre-op holding  Blood pressure 127/74, pulse (!) 53, temperature 98 2 °F (36 8 °C), temperature source Oral, resp  rate 18, height 5' 10" (1 778 m), weight 110 kg (243 lb), SpO2 97 %  ,Body mass index is 34 87 kg/m²      Invasive Devices     Peripheral Intravenous Line  Duration           Peripheral IV 12/16/22 Left;Dorsal (posterior) Hand <1 day                Physical Exam:   General appearance: alert, cooperative and no distress    Extremity: R foot dsg C/D/I  msk and nvs baseline                 Lab, Imaging and other studies:   Admission on 12/16/2022   Component Date Value   • POC Glucose 12/16/2022 114      Imaging: I have personally reviewed pertinent films in PACS

## 2022-12-16 NOTE — ANESTHESIA PREPROCEDURE EVALUATION
Procedure:  Partial 3rd RAY RESECTION FOOT (Right: Foot)  Right gastrocnemius recession (Right: Ankle)    Relevant Problems   ANESTHESIA (within normal limits)      CARDIO   (+) Essential hypertension      ENDO   (+) Acquired hypothyroidism   (+) Type 2 diabetes mellitus with circulatory disorder, without long-term current use of insulin (HCC)      GI/HEPATIC   (+) GERD (gastroesophageal reflux disease)      /RENAL   (+) Stage 4 chronic kidney disease (HCC)      HEMATOLOGY   (+) Chronic anemia      Other   (+) Osteomyelitis of foot, right, acute (HCC)        Physical Exam    Airway    Mallampati score: II  TM Distance: >3 FB  Neck ROM: full     Dental       Cardiovascular  Cardiovascular exam normal    Pulmonary  Pulmonary exam normal     Other Findings        Anesthesia Plan  ASA Score- 3     Anesthesia Type- IV sedation with anesthesia with ASA Monitors  Additional Monitors:   Airway Plan:           Plan Factors-Exercise tolerance (METS): >4 METS  Chart reviewed  EKG reviewed  Existing labs reviewed  Patient summary reviewed  Patient is not a current smoker  Induction-     Postoperative Plan-     Informed Consent- Anesthetic plan and risks discussed with patient  I personally reviewed this patient with the CRNA  Discussed and agreed on the Anesthesia Plan with the CRNA  Pippa Lares

## 2022-12-16 NOTE — DISCHARGE INSTR - AVS FIRST PAGE
Dr Elizabet Del Angel    1  Take your prescribed medication as directed  2  Upon arrival at home, lie down and elevate your surgical foot on 2 pillows  3  Remain quiet, off your feet as much as possible, for the first 24-48 hours  This is when your feet first swell and may become painful  After 48 hours you may begin limited walking following these restrictions:   Nonweightbearing to surgical foot  4  Drink large quantities of water  Consume no alcohol  Continue a well-balanced diet  5  Report any unusual discomfort or fever to this office  6  A limited amount of discomfort and swelling is to be expected  In some cases the skin may take on a bruised appearance  The surgical solution that was applied to your foot prior to the operation is dark in color and the operation site may appear to be oozing when it actually is not  7  A slight amount of blood is to be expected, and is no cause for alarm  Do not remove the dressings  If there is active bleeding and if the bleeding persists, add additional gauze to the bandage, apply direct pressure, elevate your feet and call this office  8  Do not get the dressings wet  As regular bathing may be inconvenient, sponge baths are recommended  9  When anesthesia wears off and if any discomfort should be present, apply an ice pack directly over the operated area for 15 minute intervals for several hours or until the pain leaves  (USE IN EXCESS OF 15 MINUTES COULD CAUSE FROSTBITE)  Do not use hot water bags or electric pads  A convenient icepack can be made by placing ice cubes in a plastic bag and covering this with a towel  10  If necessary, take a mild laxative before retiring  11  Having performed the operation, we are interested in a prompt recovery  Please cooperate by following the above instructions  12  Please call [facility]'s wound care center to schedule your post-op appointment (one week after surgery) or call with any other questions

## 2022-12-16 NOTE — TELEPHONE ENCOUNTER
Caller: Parker Nolasco    Doctor: Bobby Joshi    Reason for call: had procedure today - went to RX and no pain med - plase call something in ASAP    Call back#: [phone]

## 2022-12-16 NOTE — OP NOTE
OPERATIVE REPORT  PATIENT NAME: Rashard Glover    :  1960  MRN: [id number]  Pt Location: [facility] OR ROOM 02    SURGERY DATE: 2022    Surgeon(s) and Role:     Pérez Sheriff DPM - Primary    Preop Diagnosis:  Diabetic ulcer of right midfoot associated with diabetes mellitus due to underlying condition, with fat layer exposed ([address] ) [Z59 075, L97 412]    Post-Op Diagnosis Codes:     * Diabetic ulcer of right midfoot associated with diabetes mellitus due to underlying condition, with fat layer exposed ([address] ) [E08 621, L97 412]    Procedure(s) (LRB):  Partial 3rd RAY excision FOOT, plantar wound excision (Right)    Specimen(s):  ID Type Source Tests Collected by Time Destination   1 : right third toe Tissue Toe, Right TISSUE EXAM Steven White DPM 2022 1228        Estimated Blood Loss:   Minimal    Drains:  * No LDAs found *    Anesthesia Type:   Choice    Operative Indications:  Diabetic ulcer of right midfoot associated with diabetes mellitus due to underlying condition, with fat layer exposed ([address] ) [O22 452, L97 412]      Operative Findings:  Consistent with diagnosis - devitalized appearing head of 3rd metatarsal and 3rd toe with direct communication with underlying plantar ulceration  S/p distal 3rd ray excision with remaining 3rd metatarsal hard and viable  Surgical cure for OM presumed  Complications:   None    Procedure and Technique:    Under mild sedation, the patient was brought into the operating room and remained on the stretcher in the supine position  A pneumatic ankle tourniquet was then placed around the patient's right ankle with ample webril padding  A time out was performed to confirm the correct patient, procedure and site with all parties in agreement  Following IV sedation, an prox 3rd ray block was performed consisting of 10 ml of 1% Lidocaine and 0 25% Bupivacaine in a 1:1 mixture  The foot was then scrubbed, prepped and draped in the usual aseptic manner   An esmarch bandage was utilized to exsangunate the patients foot and the pneumatic ankle tourniquet was then inflated  The esmarch bandage was removed and the foot was placed on the operating room table  Attention was directed to the dorsal right foot where a racquet type incision was made through skin to bone of the distal 3rd ray encompassing the 3rd toe in excision  The 3rd toe was disarticulated at the 3rd MTPJ and passed off the field  Using a sagittal saw the distal 3rd metatarsal was resected and passed off the field and noted to be yellowed and nonviable  The distal 3rd ray was sent for gross pathology  The remaining 3rd metatarsal was assessed and appeared hard and white without signs of infection  The wound was flushed with nss  Vicryl was used to reapproximate deep layers and nylon was used to reapproximate skin  Attention was directed to the plantar ulceration where a 3:1 elliptical incision was made through skin with a #15 blade excising the 0 5x0 5cm ulceration  The wound was flushed with nss  Deep dermal layer reapproximated with vicryl  Skin reapproximated with nylon  The skin was cleansed and dried  Xeroform, dsd applied  The PNAT was deflated at around 38min and a prompt hyperemic response was noted to all remaining digits  The patient tolerated the procedure and anesthesia well and was transported to the PACU with vital signs stable  As with many limb salvage procedures, we contemplate the possibility of performing further stages to this procedure  Procedures may include debridements, delayed closure, plastic surgery techniques, or more proximal amputations  This procedure may be considered part of a multi-staged limb salvage treatment plan        I was present for the entire procedure    Patient Disposition:  PACU         SIGNATURE: Leonor Peña DPM  DATE: December 16, 2022  TIME: 1:04 PM

## 2022-12-23 ENCOUNTER — OFFICE VISIT (OUTPATIENT)
Dept: PODIATRY | Age: 62
End: 2022-12-23

## 2022-12-23 VITALS
SYSTOLIC BLOOD PRESSURE: 126 MMHG | WEIGHT: 243 LBS | HEIGHT: 70 IN | DIASTOLIC BLOOD PRESSURE: 82 MMHG | BODY MASS INDEX: 34.79 KG/M2

## 2022-12-23 DIAGNOSIS — E11.52 TYPE 2 DIABETES MELLITUS WITH DIABETIC PERIPHERAL ANGIOPATHY AND GANGRENE, WITHOUT LONG-TERM CURRENT USE OF INSULIN (HCC): Primary | ICD-10-CM

## 2022-12-23 DIAGNOSIS — Z89.431 STATUS POST AMPUTATION OF RIGHT FOOT THROUGH METATARSAL BONE (HCC): ICD-10-CM

## 2022-12-23 NOTE — PROGRESS NOTES
Letitia Duran presents POD#7 s/p right partial 3rd ray excision and excision of plantar foot wound (DOS 12/16/22) due to presumed OM distal 3rd ray  Condition: Dressing C/D/I  Patient is stable postop 1 week    Incision: to dorsal and plantar foot (distal 3rd ray) appears well coapted without dehiscence  All sutures intact  No acute SOI or incision necrosis  Dressing instructions given to patient (dsd QD)  He may shower but not place weigh ton front of foot  Do not scrub incision  Rest the foot as much as possible and elevate/ice if swollen  Ambulatory status: NWB RLE    Images reviewed today: XR right foot 12/16/22 (post-op) 3v, s/p resection of distal 3rd ray  Small ossicle remaining in soft tissue  RTC: 7d for suture removal  Call sooner if issues arise

## 2022-12-30 ENCOUNTER — OFFICE VISIT (OUTPATIENT)
Dept: PODIATRY | Age: 62
End: 2022-12-30

## 2022-12-30 VITALS
SYSTOLIC BLOOD PRESSURE: 122 MMHG | DIASTOLIC BLOOD PRESSURE: 78 MMHG | WEIGHT: 243 LBS | HEIGHT: 70 IN | BODY MASS INDEX: 34.79 KG/M2

## 2022-12-30 DIAGNOSIS — E11.52 TYPE 2 DIABETES MELLITUS WITH DIABETIC PERIPHERAL ANGIOPATHY AND GANGRENE, WITHOUT LONG-TERM CURRENT USE OF INSULIN (HCC): ICD-10-CM

## 2022-12-30 DIAGNOSIS — Z89.431 STATUS POST AMPUTATION OF RIGHT FOOT THROUGH METATARSAL BONE (HCC): Primary | ICD-10-CM

## 2022-12-30 NOTE — PROGRESS NOTES
Amy Pathak presents POD#14 s/p right partial 3rd ray excision and excision of plantar foot wound (DOS 12/16/22) due to presumed OM distal 3rd ray  Incision: dorsal incision well healed  Plantar incision appears well coapted without dehiscence, mild callusing  All sutures intact  No acute SOI or incision necrosis  Dorsal sutures removed  Will keep plantar sutures in for another week as this is a WB surface    Dressing instructions given to patient (dsd QD)  He may shower but not place weight on front of foot  Do not scrub incision  Rest the foot as much as possible and elevate/ice if swollen  Ambulatory status: WBAT RLE in toe unloading shoe    RTC: 7d for suture removal and recheck  Call sooner if issues arise   Will rx diabetic shoe/insert next appt likely

## 2023-01-01 PROBLEM — A41.9 SEPSIS (HCC): Status: RESOLVED | Noted: 2022-10-30 | Resolved: 2023-01-01

## 2023-01-02 ENCOUNTER — NURSE TRIAGE (OUTPATIENT)
Dept: OTHER | Facility: OTHER | Age: 63
End: 2023-01-02

## 2023-01-02 NOTE — TELEPHONE ENCOUNTER
Reason for Disposition  • [1] MILD-MODERATE post-op pain (e g , pain scale 1-7) AND [2] not controlled with pain medications    Answer Assessment - Initial Assessment Questions  1  SYMPTOM: "What's the main symptom you're concerned about?" (e g , pain, fever, vomiting)      Pain in ulcer that was stitched close   2  ONSET: "When did pain  start?"      Yesterday   3  SURGERY: "What surgery was performed?"      Right foot ulcer  4  DATE of SURGERY: "When was surgery performed?"       12/16  6  PAIN: "Is there any pain?" If Yes, ask: "How bad is it?"  (Scale 1-10; or mild, moderate, severe)      Mild most of time but every once in a while gets stabbing/poking pain lasting several seconds; occurs intermittently but now it is happening every minute  Pain gets worse when he moves around  7  FEVER: "Do you have a fever?" If Yes, ask: "What is your temperature, how was it measured, and when did it start?"      No   8  VOMITING: "Is there any vomiting?" If yes, ask: "How many times?"      No   9  BLEEDING: "Is there any bleeding?" If Yes, ask: "How much?" and "Where?"      No   10  OTHER SYMPTOMS: "Do you have any other symptoms?" (e g , drainage from wound, painful urination, constipation)        No redness, no swelling, no drainage    Patient is sitting with it elevated and that helps      Protocols used: POST-OP SYMPTOMS AND QUESTIONS-[facility]

## 2023-01-02 NOTE — TELEPHONE ENCOUNTER
Regarding: Post op issue  ----- Message from [facility] sent at 1/2/2023  8:27 AM EST -----  "I am having pain in my right foot that I had the amputation on back on 12/16  I never had this pain before and it started yesterday   Not sure hat is causing it but it hurts "

## 2023-01-03 ENCOUNTER — TELEPHONE (OUTPATIENT)
Dept: PODIATRY | Facility: CLINIC | Age: 63
End: 2023-01-03

## 2023-01-03 NOTE — TELEPHONE ENCOUNTER
Caller: Jose Abrams    Doctor/Office: Dr Rosario Garcia    #: [phone]    Escalation: Surgery/in severe pain/asking for pain meds/please call back/advise

## 2023-01-04 ENCOUNTER — HOSPITAL ENCOUNTER (OUTPATIENT)
Dept: RADIOLOGY | Facility: CLINIC | Age: 63
Discharge: HOME | End: 2023-01-04

## 2023-01-04 ENCOUNTER — OFFICE VISIT (OUTPATIENT)
Dept: PODIATRY | Age: 63
End: 2023-01-04

## 2023-01-04 VITALS — BODY MASS INDEX: 34.79 KG/M2 | HEIGHT: 70 IN | WEIGHT: 243 LBS

## 2023-01-04 DIAGNOSIS — E08.621 DIABETIC ULCER OF OTHER PART OF RIGHT FOOT ASSOCIATED WITH DIABETES MELLITUS DUE TO UNDERLYING CONDITION, WITH FAT LAYER EXPOSED (HCC): ICD-10-CM

## 2023-01-04 DIAGNOSIS — M79.671 RIGHT FOOT PAIN: ICD-10-CM

## 2023-01-04 DIAGNOSIS — L97.512 DIABETIC ULCER OF OTHER PART OF RIGHT FOOT ASSOCIATED WITH DIABETES MELLITUS DUE TO UNDERLYING CONDITION, WITH FAT LAYER EXPOSED (HCC): ICD-10-CM

## 2023-01-04 DIAGNOSIS — M79.671 RIGHT FOOT PAIN: Primary | ICD-10-CM

## 2023-01-04 DIAGNOSIS — Z89.431 STATUS POST AMPUTATION OF RIGHT FOOT THROUGH METATARSAL BONE (HCC): ICD-10-CM

## 2023-01-04 DIAGNOSIS — B35.3 TINEA PEDIS OF RIGHT FOOT: ICD-10-CM

## 2023-01-04 RX ORDER — KETOCONAZOLE 20 MG/G
CREAM TOPICAL DAILY
Qty: 15 G | Refills: 0 | Status: SHIPPED | OUTPATIENT
Start: 2023-01-04

## 2023-01-04 RX ORDER — CEPHALEXIN 500 MG/1
500 CAPSULE ORAL EVERY 6 HOURS SCHEDULED
Qty: 20 CAPSULE | Refills: 0 | Status: SHIPPED | OUTPATIENT
Start: 2023-01-04 | End: 2023-01-09

## 2023-01-04 RX ORDER — GABAPENTIN 300 MG/1
300 CAPSULE ORAL DAILY
Qty: 24 CAPSULE | Refills: 0 | Status: SHIPPED | OUTPATIENT
Start: 2023-01-04

## 2023-01-04 NOTE — PROGRESS NOTES
Noah De La Cruz presents s/p right partial 3rd ray excision and excision of plantar foot wound (DOS 12/16/22) due to presumed OM distal 3rd ray  Incision: dorsal incision well healed  Sutures removed to plantar incision which appears with new small full thickness ulceration, granular in base, no deep probe  Measures approx   2x 2x 1cm  No purulence, crepitus, fluctuance, erythema  Dressing instructions given to patient (dermagran, dsd QD)  He should not shower foot  Rest the foot as much as possible and elevate/ice if swollen  I rx'd cephalexin to cover soft tissue as he is prone to cellulitis  I rx'd topical ketoconazole for tinea pedis  I will look in to gabapentin for nerve pain with PCP as he has CKD 4: update 12:45pm - spoke with PCP who gives ok for gabapentin 300mg QD  Ambulatory status: WBAT RLE in toe unloading shoe    XR right foot WB 3v 1/4/22: s/p resection of distal 3rd ray  Small ossicle remaining in soft tissue  No GABRIELA noted  No osseous erosion noted  RTC: 7d for recheck/wound care  Call sooner if issues arise

## 2023-01-11 ENCOUNTER — OFFICE VISIT (OUTPATIENT)
Dept: PODIATRY | Age: 63
End: 2023-01-11

## 2023-01-11 ENCOUNTER — APPOINTMENT (OUTPATIENT)
Dept: LAB | Facility: HOSPITAL | Age: 63
End: 2023-01-11

## 2023-01-11 VITALS
BODY MASS INDEX: 34.79 KG/M2 | HEIGHT: 70 IN | SYSTOLIC BLOOD PRESSURE: 118 MMHG | DIASTOLIC BLOOD PRESSURE: 82 MMHG | WEIGHT: 243 LBS

## 2023-01-11 DIAGNOSIS — R80.9 PROTEINURIA, UNSPECIFIED TYPE: ICD-10-CM

## 2023-01-11 DIAGNOSIS — D50.9 IRON DEFICIENCY ANEMIA, UNSPECIFIED IRON DEFICIENCY ANEMIA TYPE: ICD-10-CM

## 2023-01-11 DIAGNOSIS — Z89.431 STATUS POST AMPUTATION OF RIGHT FOOT THROUGH METATARSAL BONE (HCC): Primary | ICD-10-CM

## 2023-01-11 DIAGNOSIS — N18.4 STAGE 4 CHRONIC KIDNEY DISEASE (HCC): Primary | ICD-10-CM

## 2023-01-11 DIAGNOSIS — E11.52 TYPE 2 DIABETES MELLITUS WITH DIABETIC PERIPHERAL ANGIOPATHY AND GANGRENE, WITHOUT LONG-TERM CURRENT USE OF INSULIN (HCC): ICD-10-CM

## 2023-01-11 DIAGNOSIS — I12.9 HYPERTENSIVE CHRONIC KIDNEY DISEASE WITH STAGE 1 THROUGH STAGE 4 CHRONIC KIDNEY DISEASE, OR UNSPECIFIED CHRONIC KIDNEY DISEASE: ICD-10-CM

## 2023-01-11 LAB
ALBUMIN SERPL BCP-MCNC: 3.7 G/DL (ref 3.5–5)
ALP SERPL-CCNC: 59 U/L (ref 46–116)
ALT SERPL W P-5'-P-CCNC: 31 U/L (ref 12–78)
ANION GAP SERPL CALCULATED.3IONS-SCNC: 9 MMOL/L (ref 4–13)
AST SERPL W P-5'-P-CCNC: 24 U/L (ref 5–45)
BACTERIA UR QL AUTO: ABNORMAL /HPF
BASOPHILS # BLD AUTO: 0.03 THOUSANDS/ÂΜL (ref 0–0.1)
BASOPHILS NFR BLD AUTO: 1 % (ref 0–1)
BILIRUB SERPL-MCNC: 0.28 MG/DL (ref 0.2–1)
BILIRUB UR QL STRIP: NEGATIVE
BUN SERPL-MCNC: 30 MG/DL (ref 5–25)
CALCIUM SERPL-MCNC: 9.1 MG/DL (ref 8.3–10.1)
CHLORIDE SERPL-SCNC: 103 MMOL/L (ref 96–108)
CLARITY UR: CLEAR
CO2 SERPL-SCNC: 27 MMOL/L (ref 21–32)
COLOR UR: YELLOW
CREAT SERPL-MCNC: 2.18 MG/DL (ref 0.6–1.3)
EOSINOPHIL # BLD AUTO: 0.32 THOUSAND/ÂΜL (ref 0–0.61)
EOSINOPHIL NFR BLD AUTO: 5 % (ref 0–6)
ERYTHROCYTE [DISTWIDTH] IN BLOOD BY AUTOMATED COUNT: 12.9 % (ref 11.6–15.1)
GFR SERPL CREATININE-BSD FRML MDRD: 30 ML/MIN/1.73SQ M
GLUCOSE P FAST SERPL-MCNC: 132 MG/DL (ref 65–99)
GLUCOSE UR STRIP-MCNC: NEGATIVE MG/DL
HCT VFR BLD AUTO: 35.6 % (ref 36.5–49.3)
HGB BLD-MCNC: 11.7 G/DL (ref 12–17)
HGB UR QL STRIP.AUTO: NEGATIVE
IMM GRANULOCYTES # BLD AUTO: 0.03 THOUSAND/UL (ref 0–0.2)
IMM GRANULOCYTES NFR BLD AUTO: 1 % (ref 0–2)
KETONES UR STRIP-MCNC: NEGATIVE MG/DL
LEUKOCYTE ESTERASE UR QL STRIP: NEGATIVE
LYMPHOCYTES # BLD AUTO: 2.08 THOUSANDS/ÂΜL (ref 0.6–4.47)
LYMPHOCYTES NFR BLD AUTO: 35 % (ref 14–44)
MCH RBC QN AUTO: 33.5 PG (ref 26.8–34.3)
MCHC RBC AUTO-ENTMCNC: 32.9 G/DL (ref 31.4–37.4)
MCV RBC AUTO: 102 FL (ref 82–98)
MONOCYTES # BLD AUTO: 0.57 THOUSAND/ÂΜL (ref 0.17–1.22)
MONOCYTES NFR BLD AUTO: 10 % (ref 4–12)
NEUTROPHILS # BLD AUTO: 2.94 THOUSANDS/ÂΜL (ref 1.85–7.62)
NEUTS SEG NFR BLD AUTO: 48 % (ref 43–75)
NITRITE UR QL STRIP: NEGATIVE
NON-SQ EPI CELLS URNS QL MICRO: ABNORMAL /HPF
NRBC BLD AUTO-RTO: 0 /100 WBCS
PH UR STRIP.AUTO: 6 [PH]
PHOSPHATE SERPL-MCNC: 3.7 MG/DL (ref 2.3–4.1)
PLATELET # BLD AUTO: 211 THOUSANDS/UL (ref 149–390)
PMV BLD AUTO: 10 FL (ref 8.9–12.7)
POTASSIUM SERPL-SCNC: 4.5 MMOL/L (ref 3.5–5.3)
PROT SERPL-MCNC: 7.7 G/DL (ref 6.4–8.4)
PROT UR STRIP-MCNC: ABNORMAL MG/DL
PTH-INTACT SERPL-MCNC: 78.3 PG/ML (ref 18.4–80.1)
RBC # BLD AUTO: 3.49 MILLION/UL (ref 3.88–5.62)
RBC #/AREA URNS AUTO: ABNORMAL /HPF
SODIUM SERPL-SCNC: 139 MMOL/L (ref 135–147)
SP GR UR STRIP.AUTO: 1.02 (ref 1–1.03)
URATE SERPL-MCNC: 6.7 MG/DL (ref 3.5–8.5)
UROBILINOGEN UR QL STRIP.AUTO: 0.2 E.U./DL
WBC # BLD AUTO: 5.97 THOUSAND/UL (ref 4.31–10.16)
WBC #/AREA URNS AUTO: ABNORMAL /HPF

## 2023-01-11 NOTE — PROGRESS NOTES
Zohra Owen presents s/p right partial 3rd ray excision and excision of plantar foot wound (DOS 12/16/22) due to presumed OM distal 3rd ray  Incision: dorsal and plantar incisions fully healed  No SOI  C/w gabapentin for acute nerve pain (300mg daily) as he notes this has been helping with pain  Ambulatory status: WBAT RLE in toe unloading shoe however may transition into sneaker with soft diabetic inserts    I rx'd custom diabetic shoes and inserts today for him to get fit at [facility]  RTC: 9 weeks for at risk foot care  Call sooner if issues arise

## 2023-01-11 NOTE — PATIENT INSTRUCTIONS
[address] [facility] [address]  com: [employer]™ [id number], 2-Layer Diabetic Insoles (S: Women: 7-8, Men: 5-6) : Health & Household         Achilles Tendon Stretching Exercises    A) Standing Gastrocnemius stretch  Place hands on wall or chair  If using wall, put your hands at eye level  Step the leg you want to stretch behind you  Keep your back heel on the floor and point your toes straight ahead or slightly inward towards the heel of the opposite foot  Bend your knee toward the wall while keeping your back leg straight  Lean toward the wall until you feel a gentle stretch in you calf of the straight leg  Don't lean so far that you feel pain  Hold for 15 seconds  Complete 3 reps  B) Standing soleus stretch  Place your hands on the wall or chair  If using wall, put your hands at eye level  Step the leg you want to stretch behind you (your back foot will need to be closer to the front foot than the above stretch)  Keep your back heel on the floor and point your toes straight ahead or slightly inward towards the heel of the opposite foot  Bend both your front and back knee at the same time (may help to stick your butt out)  You do not need to lean towards the wall, just bend the knees  Lean toward the wall until you feel a gentle stretch in you calf of the straight leg  Don't lean so far that you feel pain  Hold for 15 seconds  Complete 3 reps  Keep these tips and tricks in mind to get the most out of your stretching; Take your time - move slowly, whether you are deepening into a stretch or changing positions  This will limit the risk of injury & discomfort  Avoid bouncing - quick sudden movements will only worsen achilles tendon issues  Stay relaxed during stretch  Keep your heel down and toes straight ahead or slightly inward - this will allow the achilles tendon to stretch properly  Stop if you feel pain - Don't strain or force your muscle  If you feel sharp pain, stop immediately

## 2023-01-12 LAB
CREAT UR-MCNC: 139 MG/DL
MICROALBUMIN UR-MCNC: 305 MG/L (ref 0–20)
MICROALBUMIN/CREAT 24H UR: 219 MG/G CREATININE (ref 0–30)

## 2023-01-13 LAB
ALBUMIN SERPL ELPH-MCNC: 4.25 G/DL (ref 3.5–5)
ALBUMIN SERPL ELPH-MCNC: 54.5 % (ref 52–65)
ALBUMIN UR ELPH-MCNC: 100 %
ALPHA1 GLOB MFR UR ELPH: 0 %
ALPHA1 GLOB SERPL ELPH-MCNC: 0.34 G/DL (ref 0.1–0.4)
ALPHA1 GLOB SERPL ELPH-MCNC: 4.3 % (ref 2.5–5)
ALPHA2 GLOB MFR UR ELPH: 0 %
ALPHA2 GLOB SERPL ELPH-MCNC: 0.94 G/DL (ref 0.4–1.2)
ALPHA2 GLOB SERPL ELPH-MCNC: 12 % (ref 7–13)
B-GLOBULIN MFR UR ELPH: 0 %
BETA GLOB ABNORMAL SERPL ELPH-MCNC: 0.4 G/DL (ref 0.4–0.8)
BETA1 GLOB SERPL ELPH-MCNC: 5.1 % (ref 5–13)
BETA2 GLOB SERPL ELPH-MCNC: 4.5 % (ref 2–8)
BETA2+GAMMA GLOB SERPL ELPH-MCNC: 0.35 G/DL (ref 0.2–0.5)
GAMMA GLOB ABNORMAL SERPL ELPH-MCNC: 1.53 G/DL (ref 0.5–1.6)
GAMMA GLOB MFR UR ELPH: 0 %
GAMMA GLOB SERPL ELPH-MCNC: 19.6 % (ref 12–22)
IGG/ALB SER: 1.2 {RATIO} (ref 1.1–1.8)
KAPPA LC FREE SER-MCNC: 76.1 MG/L (ref 3.3–19.4)
KAPPA LC FREE/LAMBDA FREE SER: 1.69 {RATIO} (ref 0.26–1.65)
LAMBDA LC FREE SERPL-MCNC: 44.9 MG/L (ref 5.7–26.3)
PROT PATTERN SERPL ELPH-IMP: NORMAL
PROT PATTERN UR ELPH-IMP: ABNORMAL
PROT SERPL-MCNC: 7.8 G/DL (ref 6.4–8.2)
PROT UR-MCNC: 44 MG/DL

## 2023-01-23 ENCOUNTER — NURSE TRIAGE (OUTPATIENT)
Dept: OTHER | Facility: OTHER | Age: 63
End: 2023-01-23

## 2023-01-23 NOTE — TELEPHONE ENCOUNTER
Reason for Disposition  • [1] Ulcer, wound, blister, sore, or red area AND [2] new or increasing    Answer Assessment - Initial Assessment Questions  1  SYMPTOM: "What's the main symptom you're concerned about?" (e g , rash, sore, callus, drainage, numbness)      Black spot on a fourth toe right leg  2  LOCATION: "Where is the black spot located?" (e g , foot/toe, top/bottom, left/right)     Right fourth toe   3  ONSET: "When did the black spot  start?"      2 days ago   4  PAIN: "Is there any pain?" If Yes, ask: "How bad is it?" (Scale: 1-10; mild, moderate, severe)      No   5  CAUSE: "What do you think is causing the symptoms?"      Diabetes   6   OTHER SYMPTOMS: "Do you have any other symptoms?" (e g , fever, weakness)      No    Protocols used: DIABETES - FOOT PROBLEMS AND QUESTIONS-ADULT-[facility]

## 2023-01-23 NOTE — TELEPHONE ENCOUNTER
Regarding: black spot on toe/ post surgery  ----- Message from Shaneka Peck sent at 1/23/2023  8:04 AM EST -----  " I had surgery about a month ago and I'm noticing that I now have a black spot on my toe "

## 2023-01-23 NOTE — TELEPHONE ENCOUNTER
Talked to Susan Santiago and got him scheduled for an appointment on Feb 2nd at 10:45 am with Dr Jose Cruz Narvaez in [place]   Got disconnected from patient during phone call will try to reach again before end of day

## 2023-01-31 DIAGNOSIS — N18.4 STAGE 4 CHRONIC KIDNEY DISEASE (HCC): ICD-10-CM

## 2023-01-31 DIAGNOSIS — D50.9 IRON DEFICIENCY ANEMIA, UNSPECIFIED IRON DEFICIENCY ANEMIA TYPE: ICD-10-CM

## 2023-01-31 DIAGNOSIS — I12.9 HYPERTENSIVE CHRONIC KIDNEY DISEASE WITH STAGE 1 THROUGH STAGE 4 CHRONIC KIDNEY DISEASE, OR UNSPECIFIED CHRONIC KIDNEY DISEASE: ICD-10-CM

## 2023-01-31 RX ORDER — LOSARTAN POTASSIUM 25 MG/1
25 TABLET ORAL DAILY
Qty: 30 TABLET | Refills: 2 | Status: SHIPPED | OUTPATIENT
Start: 2023-01-31

## 2023-01-31 RX ORDER — FERROUS SULFATE TAB EC 324 MG (65 MG FE EQUIVALENT) 324 (65 FE) MG
324 TABLET DELAYED RESPONSE ORAL EVERY OTHER DAY
Qty: 15 TABLET | Refills: 2 | Status: SHIPPED | OUTPATIENT
Start: 2023-01-31

## 2023-02-02 ENCOUNTER — OFFICE VISIT (OUTPATIENT)
Dept: PODIATRY | Facility: CLINIC | Age: 63
End: 2023-02-02

## 2023-02-02 VITALS — WEIGHT: 243 LBS | HEIGHT: 70 IN | BODY MASS INDEX: 34.79 KG/M2

## 2023-02-02 DIAGNOSIS — S90.221A SUBUNGUAL HEMATOMA OF TOE OF RIGHT FOOT, INITIAL ENCOUNTER: Primary | ICD-10-CM

## 2023-02-02 NOTE — PROGRESS NOTES
Assessment/Plan:      Diagnoses and all orders for this visit:    Subungual hematoma of toe of right foot, initial encounter      -Intact subungual hematoma of the right toe, advised no action  - I discussed with him though that these are the type of issues that he should be vigilant of in his feet  - Daily pedal examinations for any acute changes will help prevent further infection, amputation in the future  - He is to follow-up with Dr Duke Flowers for continued high risk foot care    Subjective:     Patient ID: William Michele is a 57 y o  male  Patient presents evaluation management of a new onset issue to his right third digit, he is diabetic, and wears steel toed boots and believes that this happens at work  Believes that there is blood under the nail  Review of Systems   Constitutional: Negative for chills and fever  HENT: Negative for ear pain and sore throat  Eyes: Negative for pain and visual disturbance  Respiratory: Negative for cough and shortness of breath  Cardiovascular: Negative for chest pain and palpitations  Gastrointestinal: Negative for abdominal pain and vomiting  Genitourinary: Negative for dysuria and hematuria  Musculoskeletal: Negative for arthralgias and back pain  Skin: Negative for color change and rash  Neurological: Negative for seizures and syncope  All other systems reviewed and are negative  Objective:     Physical Exam  Constitutional:       Appearance: Normal appearance  He is obese  Musculoskeletal:      Comments: No tenderness to the right fourth digit, there is adjacent partial amputations of the second and third digits  No drainage, there is a mycotic nail present as well   Neurological:      Mental Status: He is alert

## 2023-02-24 ENCOUNTER — OFFICE VISIT (OUTPATIENT)
Dept: NEPHROLOGY | Facility: CLINIC | Age: 63
End: 2023-02-24

## 2023-02-24 VITALS
SYSTOLIC BLOOD PRESSURE: 130 MMHG | BODY MASS INDEX: 35.71 KG/M2 | HEART RATE: 57 BPM | HEIGHT: 70 IN | OXYGEN SATURATION: 99 % | DIASTOLIC BLOOD PRESSURE: 70 MMHG | WEIGHT: 249.4 LBS | TEMPERATURE: 98.7 F

## 2023-02-24 DIAGNOSIS — N18.32 STAGE 3B CHRONIC KIDNEY DISEASE (HCC): Primary | ICD-10-CM

## 2023-02-24 DIAGNOSIS — E87.5 HYPERKALEMIA: ICD-10-CM

## 2023-02-24 DIAGNOSIS — R80.1 PERSISTENT PROTEINURIA: ICD-10-CM

## 2023-02-24 DIAGNOSIS — M86.171 OSTEOMYELITIS OF FOOT, RIGHT, ACUTE (HCC): ICD-10-CM

## 2023-02-24 DIAGNOSIS — N18.30 BENIGN HYPERTENSION WITH CKD (CHRONIC KIDNEY DISEASE) STAGE III (HCC): ICD-10-CM

## 2023-02-24 DIAGNOSIS — E11.21 DIABETIC NEPHROPATHY ASSOCIATED WITH TYPE 2 DIABETES MELLITUS (HCC): ICD-10-CM

## 2023-02-24 DIAGNOSIS — I12.9 BENIGN HYPERTENSION WITH CKD (CHRONIC KIDNEY DISEASE) STAGE III (HCC): ICD-10-CM

## 2023-02-24 NOTE — PROGRESS NOTES
Nephrology   Office [address] [facility] David Almonte 70 y o  male MRN: [id number]    Encounter: [id number]        Benito7 Aditi Heart was seen in the [facility] office today  All diagnoses and orders for visit:     1  Stage 3b chronic kidney disease ([facility] )  · CKD 3B/4  Per [facility]'s records review, baseline creatinine appears to be around 1 8-2 2 mg/dL  Most recent creatinine 2 3 mg/dL  Etiology likely diabetic nephropathy, hypertensive nephrosclerosis with longstanding hypertension over 40 years, obesity  Ultrasound was negative/normal   Grade A2 albuminuria noted  · Continues to have mild hyperkalemia precluding escalation of losartan  · Does have history of osteomyelitis status post TMA therefore unclear if he would be a good candidate for SGLT2 inhibitor  He is hesitant and would like to wait  · Repeat lab work in 1 month  I have encouraged a low potassium diet in the interim  He has been enjoying potato products   -     Basic metabolic panel; Future; Expected date: 03/24/2023  2  Hyperkalemia  · Counseled on low potassium diet  Has been enjoying large amount of potato products  Repeat blood work 1 month  -     Basic metabolic panel; Future; Expected date: 03/24/2023  3  Diabetic nephropathy associated with type 2 diabetes mellitus ([facility] )  · Presumed diagnosis  Longstanding diabetes greater than 10 years with grade A2 albuminuria  On losartan 25 mg daily and would prefer 50 mg daily however hyperkalemia precludes this  May potentially be candidate for SGLT2 inhibitor however he has a history of osteomyelitis and amputation recently  Goal A1c closer to 7 0%  We discussed endocrinology referral however he declined today  4  Benign hypertension with CKD (chronic kidney disease) stage III ([facility])  · Goal blood pressure less than 130/80 mmHg  Blood pressure appropriate when repeated by me  5   Osteomyelitis of foot, right, acute ([facility])  6  Persistent proteinuria  · Monoclonal gammopathy was ruled out  Likely due to diabetic nephropathy as above      HPI: Yahaira Franz is a 70 y o  male who follows with Dr Samuel Cedeno for CKD 3B in the setting of hypertension over 40 years and diabetes over 10 years  Hyperkalemia noted on most recent lab work precluding ability to increase losartan dose  Did discuss potentially initiating patient on SGLT2 inhibitor however he is hesitant due to recent osteomyelitis and amputation  He will attempt a low potassium diet and repeat blood work in 1 month  ROS:   Review of Systems   Constitutional: Negative for chills and fever  HENT: Negative for ear pain and sore throat  Eyes: Negative for pain and visual disturbance  Respiratory: Negative for cough and shortness of breath  Cardiovascular: Negative for chest pain and palpitations  Gastrointestinal: Negative for abdominal pain and vomiting  Genitourinary: Negative for dysuria and hematuria  Musculoskeletal: Negative for arthralgias and back pain  Skin: Negative for color change and rash  Neurological: Negative for seizures and syncope  All other systems reviewed and are negative        Allergies: Aspirin, Other, Felodipine, Lisinopril, Niacin, Nsaids, and Simvastatin    Medications:   Current Outpatient Medications:   •  acetaminophen (TYLENOL) 500 mg tablet, Take 1,000 mg by mouth every 6 (six) hours as needed for mild pain, Disp: , Rfl:   •  allopurinol (ZYLOPRIM) 100 mg tablet, Take 100 mg by mouth daily, Disp: , Rfl:   •  aspirin (ECOTRIN LOW STRENGTH) 81 mg EC tablet, Take 81 mg by mouth daily, Disp: , Rfl:   •  atorvastatin (LIPITOR) 20 mg tablet, Take 20 mg by mouth see administration instructions 3x a week, Monday, Wednesday, and Friday, Disp: , Rfl:   •  bisacodyl (DULCOLAX) 5 mg EC tablet, Take 1 tablet (5 mg total) by mouth daily as needed for constipation, Disp: 30 tablet, Rfl: 0  •  co-enzyme Q-10 100 mg capsule, Take 100 mg by mouth daily, Disp: , Rfl:   •  ferrous sulfate 324 (65 Fe) mg, TAKE 1 TABLET (324 MG TOTAL) BY MOUTH EVERY OTHER DAY, Disp: 15 tablet, Rfl: 2  •  gabapentin (Neurontin) 300 mg capsule, Take 1 capsule (300 mg total) by mouth daily, Disp: 24 capsule, Rfl: 0  •  glipiZIDE (GLUCOTROL XL) 5 mg 24 hr tablet, Take 5 mg by mouth daily, Disp: , Rfl:   •  levothyroxine 150 mcg tablet, TAKE 1 TABLET DAILY AT LEAST 30 MINUTES PRIOR TO BREAKFAST OR OTHER MEDICATIONS (REPLACES 137 MCG), Disp: , Rfl:   •  losartan (COZAAR) 25 mg tablet, TAKE 1 TABLET (25 MG TOTAL) BY MOUTH DAILY  , Disp: 30 tablet, Rfl: 2  •  metoprolol succinate (TOPROL-XL) 100 mg 24 hr tablet, Take 100 mg by mouth daily, Disp: , Rfl:   •  Multiple Vitamins-Minerals (multivitamin with minerals) tablet, Take 1 tablet by mouth daily, Disp: , Rfl:   •  omeprazole (PriLOSEC) 20 mg delayed release capsule, 20 mg daily, Disp: , Rfl:   •  fluticasone (FLOVENT HFA) 110 MCG/ACT inhaler, Inhale (Patient not taking: Reported on 2/24/2023), Disp: , Rfl:   •  ketoconazole (NIZORAL) 2 % cream, Apply topically daily (Patient not taking: Reported on 2/24/2023), Disp: 15 g, Rfl: 0    Past Medical History:   Diagnosis Date   • Chronic kidney failure, stage 4    • COVID-19     12/2019   • Diabetes mellitus ([facility] )    • GERD (gastroesophageal reflux disease)    • Gout    • Hypertension      Past Surgical History:   Procedure Laterality Date   • ANKLE FRACTURE SURGERY Right    • BONE BIOPSY Right 11/30/2022    Procedure: Right 3rd metatarsal head bone biopsy;  Surgeon: Josefine Kawasaki, DPM;  Location: [facility];  Service: Podiatry   • CHOLECYSTECTOMY     • ID AMPUTATION METATARSAL W/TOE SINGLE Right 12/16/2022    Procedure: Partial 3rd RAY excision FOOT, plantar wound excision;  Surgeon: Josefine Kawasaki, DPM;  Location: [facility];  Service: Podiatry   • TOE AMPUTATION Right 11/2/2022    Procedure: RIGHT 2ND TOE PARTIAL AMPUTATION and right foot wound debridement;  Surgeon: Catracho Mccullough DPM;  Location: [facility];  Service: Podiatry Family History   Problem Relation Age of Onset   • Gout Mother    • Diabetes Father    • Heart disease Father       reports that he has quit smoking  His smoking use included cigarettes  He smoked an average of 1 pack per day  He has never used smokeless tobacco  He reports that he does not currently use alcohol  He reports that he does not use drugs  Physical Exam:   Vitals:    02/24/23 1544 02/24/23 1609   BP: 158/98 130/70   BP Location: Left arm    Patient Position: Sitting    Pulse: 57    Temp: 98 7 °F (37 1 °C)    SpO2: 99%    Weight: 113 kg (249 lb 6 4 oz)    Height: 5' 10" (1 778 m)      Body mass index is 35 79 kg/m²  General: conscious, cooperative, in no acute distress, appears stated age  Eyes: conjunctivae pale, anicteric sclerae  ENT: lips and mucous membranes moist  Neck: supple, no JVD, no masses  Chest:  essentially clear breath sounds bilaterally, no crackles, ronchus or wheezings  CVS: S1 & S2, normal rate, regular rhythm  Abdomen: soft, non-tender, non-distended, normoactive bowel sounds, rounded  Extremities: no edema of both legs  Skin: no rash   Neuro: awake, alert, oriented       Diagnostic Data:  Lab: I have personally reviewed pertinent lab results  ,   CBC:       CMP: No results found for: SODIUM, K, CL, CO2, ANIONGAP, BUN, CREATININE, GLUCOSE, CALCIUM, AST, ALT, ALKPHOS, PROT, BILITOT, EGFR,   PT/INR: No results found for: PT, INR,   Magnesium: No components found for: MAG,  Phosphorous: No results found for: PHOS    Patient Instructions   Consider Jardiance or [place] for diabetes and kidney disease  Low potassium diet- left than 2,000 mg per day  Decrease potato and black tea     Lab work in 1 month       Portions of the record may have been created with voice recognition software  Occasional wrong word or "sound a like" substitutions may have occurred due to the inherent limitations of voice recognition software   Read the chart carefully and recognize, using context, where substitutions have occurred  If you have any questions, please contact the dictating provider

## 2023-02-24 NOTE — PATIENT INSTRUCTIONS
Consider Jardiance or Chloe Earing for diabetes and kidney disease  Low potassium diet- left than 2,000 mg per day   Decrease potato and black tea     Lab work in 1 month

## 2023-02-28 PROBLEM — N18.32 STAGE 3B CHRONIC KIDNEY DISEASE (HCC): Status: ACTIVE | Noted: 2023-02-28

## 2023-02-28 PROBLEM — N18.30 BENIGN HYPERTENSION WITH CKD (CHRONIC KIDNEY DISEASE) STAGE III (HCC): Status: ACTIVE | Noted: 2023-02-28

## 2023-02-28 PROBLEM — R80.1 PERSISTENT PROTEINURIA: Status: ACTIVE | Noted: 2023-02-28

## 2023-02-28 PROBLEM — I12.9 BENIGN HYPERTENSION WITH CKD (CHRONIC KIDNEY DISEASE) STAGE III (HCC): Status: ACTIVE | Noted: 2023-02-28

## 2023-02-28 PROBLEM — E11.21 DIABETIC NEPHROPATHY ASSOCIATED WITH TYPE 2 DIABETES MELLITUS (HCC): Status: ACTIVE | Noted: 2023-02-28

## 2023-03-13 DIAGNOSIS — I12.9 HYPERTENSIVE CHRONIC KIDNEY DISEASE WITH STAGE 1 THROUGH STAGE 4 CHRONIC KIDNEY DISEASE, OR UNSPECIFIED CHRONIC KIDNEY DISEASE: ICD-10-CM

## 2023-03-13 DIAGNOSIS — N18.4 STAGE 4 CHRONIC KIDNEY DISEASE (HCC): ICD-10-CM

## 2023-03-14 ENCOUNTER — TELEPHONE (OUTPATIENT)
Dept: OTHER | Facility: HOSPITAL | Age: 63
End: 2023-03-14

## 2023-03-15 ENCOUNTER — OFFICE VISIT (OUTPATIENT)
Dept: PODIATRY | Age: 63
End: 2023-03-15

## 2023-03-15 VITALS
SYSTOLIC BLOOD PRESSURE: 146 MMHG | HEIGHT: 70 IN | BODY MASS INDEX: 35.65 KG/M2 | DIASTOLIC BLOOD PRESSURE: 90 MMHG | WEIGHT: 249 LBS

## 2023-03-15 DIAGNOSIS — I12.9 HYPERTENSIVE CHRONIC KIDNEY DISEASE WITH STAGE 1 THROUGH STAGE 4 CHRONIC KIDNEY DISEASE, OR UNSPECIFIED CHRONIC KIDNEY DISEASE: ICD-10-CM

## 2023-03-15 DIAGNOSIS — N18.4 STAGE 4 CHRONIC KIDNEY DISEASE (HCC): ICD-10-CM

## 2023-03-15 DIAGNOSIS — E11.52 TYPE 2 DIABETES MELLITUS WITH DIABETIC PERIPHERAL ANGIOPATHY AND GANGRENE, WITHOUT LONG-TERM CURRENT USE OF INSULIN (HCC): Primary | ICD-10-CM

## 2023-03-15 DIAGNOSIS — L84 CALLUS OF FOOT: ICD-10-CM

## 2023-03-15 DIAGNOSIS — B35.1 ONYCHOMYCOSIS: ICD-10-CM

## 2023-03-15 RX ORDER — LOSARTAN POTASSIUM 25 MG/1
25 TABLET ORAL DAILY
Qty: 30 TABLET | Refills: 2 | Status: SHIPPED | OUTPATIENT
Start: 2023-03-15 | End: 2023-03-16 | Stop reason: SDUPTHER

## 2023-03-15 NOTE — PATIENT INSTRUCTIONS
Dr Felice Hogan Gels reusable silicone metatarsal pads ([address] [facility] [address]  com)      Achilles Tendon Stretching Exercises    A) Standing Gastrocnemius stretch  Place hands on wall or chair  If using wall, put your hands at eye level  Step the leg you want to stretch behind you  Keep your back heel on the floor and point your toes straight ahead or slightly inward towards the heel of the opposite foot  Bend your knee toward the wall while keeping your back leg straight  Lean toward the wall until you feel a gentle stretch in you calf of the straight leg  Don't lean so far that you feel pain  Hold for 15 seconds  Complete 3 reps  B) Standing soleus stretch  Place your hands on the wall or chair  If using wall, put your hands at eye level  Step the leg you want to stretch behind you (your back foot will need to be closer to the front foot than the above stretch)  Keep your back heel on the floor and point your toes straight ahead or slightly inward towards the heel of the opposite foot  Bend both your front and back knee at the same time (may help to stick your butt out)  You do not need to lean towards the wall, just bend the knees  Lean toward the wall until you feel a gentle stretch in you calf of the straight leg  Don't lean so far that you feel pain  Hold for 15 seconds  Complete 3 reps  Keep these tips and tricks in mind to get the most out of your stretching; Take your time - move slowly, whether you are deepening into a stretch or changing positions  This will limit the risk of injury & discomfort  Avoid bouncing - quick sudden movements will only worsen achilles tendon issues  Stay relaxed during stretch  Keep your heel down and toes straight ahead or slightly inward - this will allow the achilles tendon to stretch properly  Stop if you feel pain - Don't strain or force your muscle  If you feel sharp pain, stop immediately

## 2023-03-15 NOTE — PROGRESS NOTES
[facility]  1960  AT RISK FOOT CARE    1  Type 2 diabetes mellitus with diabetic peripheral angiopathy and gangrene, without long-term current use of insulin ([address] )        2  Onychomycosis        3  Callus of foot            Patient presents for at-risk foot care  Patient has no acute concerns today  Patient has significant lower extremity risk due to diminished pulses in the feet and trophic skin changes to the lower extremity including thick toenail, atrophic skin, and decreased hair growth  Today's treatment includes:  1  Diabetic foot exam as below  Q7 findings (right 2nd toe partial amputation and partial amputation 3rd ray right foot)  2  Debridement of toenails x9  Using nail nipper, ld, and curette, nails were sharply debrided, reduced in thickness and length  Devitalized nail tissue and fungal debris excised and removed  Patient tolerated well  3  Paring of callus x1  Using a #15 blade the hyperkeratotic tissue below the 2nd metatarsal head right foot was pared in thickness to normal epithelium  I recommended Dr Linna Aase Gels silicone metatarsal pad and tendon achilles stretching for equinus deformity to reduce pressure to forefoot  Discussed proper shoe gear, daily inspections of feet, and general foot health with patient  Patient has Q7 findings and is recommended for at risk foot care every 9-10 weeks  Diabetic Foot Exam    Patient's shoes and socks removed  Right Foot/Ankle   Right Foot Inspection  Skin Exam: skin intact, dry skin, callus (submet 2 head) and callus (submet 2 head)  No ulcer  Toe Exam: right toe deformity (Absent 3rd toe and partial amputation 2nd toe)  Sensory   Vibration: absent  Proprioception: absent  Monofilament testing: absent    Vascular  Capillary refills: < 3 seconds  The right DP pulse is 1+  The right PT pulse is 0  Left Foot/Ankle  Left Foot Inspection  Skin Exam: skin intact and dry skin  No ulcer       Toe Exam: left toe deformity (Hammertoes, forefoot PF with equinus of achilles)  Sensory   Vibration: absent  Proprioception: absent  Monofilament testing: absent    Vascular  Capillary refills: < 3 seconds  The left DP pulse is 1+  The left PT pulse is 0       Assign Risk Category  Deformity present  Loss of protective sensation  Weak pulses  Risk: 3

## 2023-03-16 DIAGNOSIS — I12.9 HYPERTENSIVE CHRONIC KIDNEY DISEASE WITH STAGE 1 THROUGH STAGE 4 CHRONIC KIDNEY DISEASE, OR UNSPECIFIED CHRONIC KIDNEY DISEASE: ICD-10-CM

## 2023-03-16 DIAGNOSIS — N18.4 STAGE 4 CHRONIC KIDNEY DISEASE (HCC): ICD-10-CM

## 2023-03-16 RX ORDER — LOSARTAN POTASSIUM 25 MG/1
25 TABLET ORAL DAILY
Qty: 90 TABLET | Refills: 2 | Status: SHIPPED | OUTPATIENT
Start: 2023-03-16

## 2023-05-11 DIAGNOSIS — D50.9 IRON DEFICIENCY ANEMIA, UNSPECIFIED IRON DEFICIENCY ANEMIA TYPE: ICD-10-CM

## 2023-05-11 RX ORDER — FERROUS SULFATE TAB EC 324 MG (65 MG FE EQUIVALENT) 324 (65 FE) MG
324 TABLET DELAYED RESPONSE ORAL EVERY OTHER DAY
Qty: 15 TABLET | Refills: 2 | Status: SHIPPED | OUTPATIENT
Start: 2023-05-11 | End: 2023-05-15 | Stop reason: SDUPTHER

## 2023-05-15 DIAGNOSIS — D50.9 IRON DEFICIENCY ANEMIA, UNSPECIFIED IRON DEFICIENCY ANEMIA TYPE: ICD-10-CM

## 2023-05-15 RX ORDER — FERROUS SULFATE TAB EC 324 MG (65 MG FE EQUIVALENT) 324 (65 FE) MG
324 TABLET DELAYED RESPONSE ORAL EVERY OTHER DAY
Qty: 15 TABLET | Refills: 0 | Status: SHIPPED | OUTPATIENT
Start: 2023-05-15

## 2023-05-24 ENCOUNTER — OFFICE VISIT (OUTPATIENT)
Dept: PODIATRY | Age: 63
End: 2023-05-24

## 2023-05-24 VITALS — HEIGHT: 70 IN | WEIGHT: 249 LBS | BODY MASS INDEX: 35.65 KG/M2

## 2023-05-24 DIAGNOSIS — L84 CALLUS OF FOOT: ICD-10-CM

## 2023-05-24 DIAGNOSIS — E11.52 TYPE 2 DIABETES MELLITUS WITH DIABETIC PERIPHERAL ANGIOPATHY AND GANGRENE, WITHOUT LONG-TERM CURRENT USE OF INSULIN (HCC): Primary | ICD-10-CM

## 2023-05-24 DIAGNOSIS — B35.1 ONYCHOMYCOSIS: ICD-10-CM

## 2023-05-24 RX ORDER — AMMONIUM LACTATE 12 G/100G
CREAM TOPICAL AS NEEDED
Qty: 385 G | Refills: 0 | Status: SHIPPED | OUTPATIENT
Start: 2023-05-24

## 2023-05-24 NOTE — PROGRESS NOTES
Nile Cortez  1960  AT RISK FOOT CARE    1  Type 2 diabetes mellitus with diabetic peripheral angiopathy and gangrene, without long-term current use of insulin ([address] )        2  Onychomycosis        3  Callus of foot  ammonium lactate (LAC-HYDRIN) 12 % cream          Patient presents for at-risk foot care  Patient has no acute concerns today  Patient is retiring end of June and cannot wear his diabetic shoes at work thus his work boots cause pressure points and hurt his feet  Patient has significant lower extremity risk due to diminished pulses in the feet and trophic skin changes to the lower extremity including thick toenail, atrophic skin, and decreased hair growth  Today's treatment includes:  1  Diabetic foot exam as below  Q7 findings (right 2nd toe partial amputation and partial amputation 3rd ray right foot)  2  Debridement of toenails x8  Using nail nipper, ld, and curette, nails were sharply debrided, reduced in thickness and length  Devitalized nail tissue and fungal debris excised and removed  Patient tolerated well  3  Paring of callus x1 (Right submet 2)  Using a #15 blade the hyperkeratotic tissue below the 2nd metatarsal head right foot was pared in thickness to normal epithelium  I recommended Dr Joy Infante Gels silicone metatarsal pad and tendon achilles stretching for equinus deformity to reduce pressure to forefoot  Discussed proper shoe gear, daily inspections of feet, and general foot health with patient  Patient has Q7 findings and is recommended for at risk foot care every 9-10 weeks

## 2023-06-10 DIAGNOSIS — D50.9 IRON DEFICIENCY ANEMIA, UNSPECIFIED IRON DEFICIENCY ANEMIA TYPE: ICD-10-CM

## 2023-06-10 RX ORDER — FERROUS SULFATE TAB EC 324 MG (65 MG FE EQUIVALENT) 324 (65 FE) MG
324 TABLET DELAYED RESPONSE ORAL EVERY OTHER DAY
Qty: 15 TABLET | Refills: 0 | Status: SHIPPED | OUTPATIENT
Start: 2023-06-10

## 2023-07-28 DIAGNOSIS — D50.9 IRON DEFICIENCY ANEMIA, UNSPECIFIED IRON DEFICIENCY ANEMIA TYPE: ICD-10-CM

## 2023-07-28 RX ORDER — FERROUS SULFATE TAB EC 324 MG (65 MG FE EQUIVALENT) 324 (65 FE) MG
324 TABLET DELAYED RESPONSE ORAL EVERY OTHER DAY
Qty: 15 TABLET | Refills: 0 | Status: SHIPPED | OUTPATIENT
Start: 2023-07-28

## 2023-07-31 RX ORDER — ALBUTEROL SULFATE 90 UG/1
2 AEROSOL, METERED RESPIRATORY (INHALATION) 4 TIMES DAILY
COMMUNITY
Start: 2023-04-06

## 2023-08-02 ENCOUNTER — OFFICE VISIT (OUTPATIENT)
Dept: PODIATRY | Age: 63
End: 2023-08-02
Payer: MEDICARE

## 2023-08-02 VITALS
WEIGHT: 245 LBS | TEMPERATURE: 98.1 F | DIASTOLIC BLOOD PRESSURE: 80 MMHG | HEIGHT: 70 IN | BODY MASS INDEX: 35.07 KG/M2 | HEART RATE: 65 BPM | OXYGEN SATURATION: 98 % | SYSTOLIC BLOOD PRESSURE: 146 MMHG

## 2023-08-02 DIAGNOSIS — M79.671 RIGHT FOOT PAIN: ICD-10-CM

## 2023-08-02 DIAGNOSIS — L84 CALLUS OF FOOT: ICD-10-CM

## 2023-08-02 DIAGNOSIS — E11.52 TYPE 2 DIABETES MELLITUS WITH DIABETIC PERIPHERAL ANGIOPATHY AND GANGRENE, WITHOUT LONG-TERM CURRENT USE OF INSULIN (HCC): Primary | ICD-10-CM

## 2023-08-02 DIAGNOSIS — B35.1 ONYCHOMYCOSIS: ICD-10-CM

## 2023-08-02 DIAGNOSIS — Z89.431 STATUS POST AMPUTATION OF RIGHT FOOT THROUGH METATARSAL BONE (HCC): ICD-10-CM

## 2023-08-02 PROCEDURE — 11055 PARING/CUTG B9 HYPRKER LES 1: CPT | Performed by: STUDENT IN AN ORGANIZED HEALTH CARE EDUCATION/TRAINING PROGRAM

## 2023-08-02 PROCEDURE — 11721 DEBRIDE NAIL 6 OR MORE: CPT | Performed by: STUDENT IN AN ORGANIZED HEALTH CARE EDUCATION/TRAINING PROGRAM

## 2023-08-02 RX ORDER — GABAPENTIN 300 MG/1
300 CAPSULE ORAL DAILY
Qty: 90 CAPSULE | Refills: 0 | Status: SHIPPED | OUTPATIENT
Start: 2023-08-02

## 2023-08-02 NOTE — PROGRESS NOTES
Mikecarlie Yang  1960  AT RISK FOOT CARE    1. Type 2 diabetes mellitus with diabetic peripheral angiopathy and gangrene, without long-term current use of insulin ([address])        2. Onychomycosis        3. Callus of foot        4. Right foot pain  gabapentin (Neurontin) 300 mg capsule      5. 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 Patient has no acute concerns today. Patient is now retired and doing very well. Patient has significant lower extremity risk due to diminished pulses in the feet and trophic skin changes to the lower extremity including thick toenail, atrophic skin, and decreased hair growth. Prior right 2nd toe partial amputation and partial amputation 3rd ray right foot      Today's treatment includes:  1. Debridement of toenails x8. Using nail nipper, ld, and curette, nails were sharply debrided, reduced in thickness and length. Devitalized nail tissue and fungal debris excised and removed. Patient tolerated well. 2. Paring of callus x1 (Right submet 2). Using a #15 blade the hyperkeratotic tissue below the 2nd metatarsal head right foot was pared in thickness to normal epithelium. I recommended Dr. Magaly Espino Gels silicone metatarsal pad and tendon achilles stretching for equinus deformity to reduce pressure to forefoot. Discussed proper shoe gear, daily inspections of feet, and general foot health with patient. Patient has Q7 findings and is recommended for at risk foot care every 9-10 weeks.

## 2023-11-03 ENCOUNTER — OFFICE VISIT (OUTPATIENT)
Dept: PODIATRY | Age: 63
End: 2023-11-03
Payer: MEDICARE

## 2023-11-03 VITALS
RESPIRATION RATE: 18 BRPM | SYSTOLIC BLOOD PRESSURE: 138 MMHG | BODY MASS INDEX: 35.68 KG/M2 | HEIGHT: 70 IN | DIASTOLIC BLOOD PRESSURE: 84 MMHG | TEMPERATURE: 98.9 F | WEIGHT: 249.2 LBS | OXYGEN SATURATION: 99 % | HEART RATE: 56 BPM

## 2023-11-03 DIAGNOSIS — B35.1 ONYCHOMYCOSIS: ICD-10-CM

## 2023-11-03 DIAGNOSIS — L84 CALLUS OF FOOT: ICD-10-CM

## 2023-11-03 DIAGNOSIS — E11.52 TYPE 2 DIABETES MELLITUS WITH DIABETIC PERIPHERAL ANGIOPATHY AND GANGRENE, WITHOUT LONG-TERM CURRENT USE OF INSULIN (HCC): Primary | ICD-10-CM

## 2023-11-03 DIAGNOSIS — L03.032 PARONYCHIA OF GREAT TOE OF LEFT FOOT: ICD-10-CM

## 2023-11-03 PROCEDURE — 11056 PARNG/CUTG B9 HYPRKR LES 2-4: CPT | Performed by: STUDENT IN AN ORGANIZED HEALTH CARE EDUCATION/TRAINING PROGRAM

## 2023-11-03 PROCEDURE — 11721 DEBRIDE NAIL 6 OR MORE: CPT | Performed by: STUDENT IN AN ORGANIZED HEALTH CARE EDUCATION/TRAINING PROGRAM

## 2023-11-03 PROCEDURE — 99213 OFFICE O/P EST LOW 20 MIN: CPT | Performed by: STUDENT IN AN ORGANIZED HEALTH CARE EDUCATION/TRAINING PROGRAM

## 2023-11-03 RX ORDER — CEPHALEXIN 500 MG/1
500 CAPSULE ORAL EVERY 12 HOURS SCHEDULED
Qty: 14 CAPSULE | Refills: 0 | Status: SHIPPED | OUTPATIENT
Start: 2023-11-03 | End: 2023-11-10

## 2023-11-03 NOTE — PROGRESS NOTES
Yudithkitty Servin  1960  AT RISK FOOT CARE    1. Type 2 diabetes mellitus with diabetic peripheral angiopathy and gangrene, without long-term current use of insulin ([address])        2. Onychomycosis        3. Callus of foot        4. Paronychia of great toe of left foot  cephalexin (KEFLEX) 500 mg capsule            Patient presents for at-risk foot care. Patient has acute concerning of left 1st toe redness present for about one week or so. Patient is now retired and doing very well. Patient has significant lower extremity risk due to diminished pulses in the feet and trophic skin changes to the lower extremity including thick toenail, atrophic skin, and decreased hair growth. Prior right 2nd toe partial amputation and partial amputation 3rd ray right foot. I reviewed PCP note from 8/22/23    On exam patient has thickened, hypertrophic, discolored, brittle toenails with subungual debris and tenderness x8   Callus: 2 (R submet 2, L submet 1)  Amputation: 2 (right 2nd toe partial amputation and partial amputation 3rd ray right foot)  Left 1st toe medial border with distal ingrowth and erythema. Today's treatment includes:  1. Debridement of toenails x8. Using nail nipper, ld, and curette, nails were sharply debrided, reduced in thickness and length. Devitalized nail tissue and fungal debris excised and removed. Patient tolerated well. 2. Paring of callus x2. Using a #15 blade the hyperkeratotic tissue below the 2nd metatarsal head right foot was pared in thickness to normal epithelium. I recommended Dr. Jason Narrow Gels silicone metatarsal pad and tendon achilles stretching for equinus deformity to reduce pressure to forefoot. 3. I slanted back left 1st toe medial border. I rx'd abx as below. (New issue)    Discussed proper shoe gear, daily inspections of feet, and general foot health with patient. Patient has Q7 findings and is recommended for at risk foot care every 9-10 weeks.     I rx'd 7d cephalexin at renal dose today for left 1st toe paronychia of medial border. I gave after care instructions. F/u 10d for recheck left 1st toe. May need formal PNA.      Patients most recent complete clinical exam was performed: 3/15/2023

## 2023-11-11 DIAGNOSIS — Z89.431 STATUS POST AMPUTATION OF RIGHT FOOT THROUGH METATARSAL BONE (HCC): ICD-10-CM

## 2023-11-11 DIAGNOSIS — M79.671 RIGHT FOOT PAIN: ICD-10-CM

## 2023-11-13 ENCOUNTER — OFFICE VISIT (OUTPATIENT)
Dept: PODIATRY | Age: 63
End: 2023-11-13
Payer: MEDICARE

## 2023-11-13 VITALS
OXYGEN SATURATION: 98 % | WEIGHT: 247.2 LBS | DIASTOLIC BLOOD PRESSURE: 88 MMHG | HEIGHT: 70 IN | HEART RATE: 63 BPM | SYSTOLIC BLOOD PRESSURE: 144 MMHG | BODY MASS INDEX: 35.39 KG/M2

## 2023-11-13 DIAGNOSIS — L03.032 PARONYCHIA OF GREAT TOE OF LEFT FOOT: Primary | ICD-10-CM

## 2023-11-13 PROCEDURE — 11730 AVULSION NAIL PLATE SIMPLE 1: CPT | Performed by: STUDENT IN AN ORGANIZED HEALTH CARE EDUCATION/TRAINING PROGRAM

## 2023-11-13 PROCEDURE — 99212 OFFICE O/P EST SF 10 MIN: CPT | Performed by: STUDENT IN AN ORGANIZED HEALTH CARE EDUCATION/TRAINING PROGRAM

## 2023-11-13 RX ORDER — GABAPENTIN 300 MG/1
300 CAPSULE ORAL DAILY
Qty: 90 CAPSULE | Refills: 0 | Status: SHIPPED | OUTPATIENT
Start: 2023-11-13

## 2023-11-13 NOTE — PROGRESS NOTES
Assessment/Plan:       Diagnoses and all orders for this visit:    Paronychia of great toe of left foot    Other orders  -     Nail removal             IMPRESSION:  Left 1st toe medial border mild paronychia     PLAN:  Left 1st toe medial border with continued soreness and erythema and ingrown of nail. Requires PNA nonperm as performed below   Aftercare instructions given  Call if redness does not fully resolve tomorrow (will re-rx abx)  F/u 1wk for recheck    Nail removal    Date/Time: 11/13/2023 8:00 AM    Performed by: Maximiliano Carvalho DPM  Authorized by: Maximiliano Carvalho DPM    Patient location:  Clinic  Indications / Diagnosis:  L1 medial border paronychia  Universal Protocol:  Consent: Verbal consent obtained. Risks and benefits: risks, benefits and alternatives were discussed  Consent given by: patient  Time out: Immediately prior to procedure a "time out" was called to verify the correct patient, procedure, equipment, support staff and site/side marked as required. Patient understanding: patient states understanding of the procedure being performed  Patient consent: the patient's understanding of the procedure matches consent given  Patient identity confirmed: verbally with patient    Location:     Foot:  L big toe  Pre-procedure details:     Skin preparation:  Betadine and alcohol  Anesthesia (see MAR for exact dosages): Anesthesia method:  None  Nail Removal:     Nail removed:  Partial    Nail side:  Medial    Nail bed sutured: no    Ingrown nail:     Wedge excision of skin: no      Nail matrix removed or ablated:  None  Post-procedure details:     Dressing:  4x4 sterile gauze, antibiotic ointment and gauze roll    Patient tolerance of procedure: Tolerated well, no immediate complications       Subjective:      Patient ID: Maryrose Osler is a 70 y.o. male. Ed presents for f/u left 1st toe redness and mild ingrown nail. Finished abx.          The following portions of the patient's history were reviewed and updated as appropriate: allergies, current medications, past family history, past medical history, past social history, past surgical history, and problem list.    Review of Systems   Constitutional:  Negative for activity change, chills and fever. HENT: Negative. Respiratory:  Negative for cough, chest tightness and shortness of breath. Cardiovascular:  Negative for chest pain and leg swelling. Endocrine: Negative. Genitourinary: Negative. Neurological: Negative. Negative for numbness. Psychiatric/Behavioral: Negative. Negative for agitation and behavioral problems. Objective:      /88   Pulse 63   Ht 5' 10" (1.778 m)   Wt 112 kg (247 lb 3.2 oz)   SpO2 98%   BMI 35.47 kg/m²          Physical Exam  Cardiovascular:      Comments: Right DP pulses palpable, PT pulses diminished. Toes warm and well perfused. Musculoskeletal:         General: No tenderness. Comments: S/p right 2nd toe partial amputation   Skin:     Findings: Erythema (Slight left 1st toe medial nail border with slight ingrown nail remaining) present. No lesion (Right sub met 3 head ulceration healed. ). Neurological:      Mental Status: He is oriented to person, place, and time. Comments: +peripheral neuropathy. Diminished protective sensation to feet.

## 2023-11-15 DIAGNOSIS — L03.032 PARONYCHIA OF GREAT TOE OF LEFT FOOT: Primary | ICD-10-CM

## 2023-11-15 RX ORDER — CEPHALEXIN 500 MG/1
500 CAPSULE ORAL EVERY 6 HOURS SCHEDULED
Qty: 20 CAPSULE | Refills: 0 | Status: SHIPPED | OUTPATIENT
Start: 2023-11-15 | End: 2023-11-20 | Stop reason: SDUPTHER

## 2023-11-20 ENCOUNTER — OFFICE VISIT (OUTPATIENT)
Dept: PODIATRY | Age: 63
End: 2023-11-20
Payer: MEDICARE

## 2023-11-20 VITALS
OXYGEN SATURATION: 100 % | TEMPERATURE: 98.3 F | SYSTOLIC BLOOD PRESSURE: 134 MMHG | HEIGHT: 70 IN | HEART RATE: 51 BPM | DIASTOLIC BLOOD PRESSURE: 82 MMHG | BODY MASS INDEX: 34.7 KG/M2 | WEIGHT: 242.4 LBS

## 2023-11-20 DIAGNOSIS — L03.032 PARONYCHIA OF GREAT TOE OF LEFT FOOT: ICD-10-CM

## 2023-11-20 PROCEDURE — 99212 OFFICE O/P EST SF 10 MIN: CPT | Performed by: STUDENT IN AN ORGANIZED HEALTH CARE EDUCATION/TRAINING PROGRAM

## 2023-11-20 RX ORDER — CEPHALEXIN 500 MG/1
500 CAPSULE ORAL EVERY 6 HOURS SCHEDULED
Qty: 20 CAPSULE | Refills: 0 | Status: SHIPPED | OUTPATIENT
Start: 2023-11-20 | End: 2023-11-25

## 2023-11-20 NOTE — PROGRESS NOTES
Assessment/Plan:       Diagnoses and all orders for this visit:    Paronychia of great toe of left foot  -     cephalexin (KEFLEX) 500 mg capsule; Take 1 capsule (500 mg total) by mouth every 6 (six) hours for 5 days               IMPRESSION:  Left 1st toe medial border mild paronychia s/p PNA nonperm 11/13/23     PLAN:  Left 1st toe medial border 1wk s/p PNA nonperm. Abx was rx'd 11/15/23. Soreness and erythema have resolved however small superficial wound to proximal nail bed remains. C/w soaking and abx ointment + bandaid  I did re-rx abx but only if redness or purulence arises. He should continue current abx rx and only take other PRN  F/u 2wks for recheck; call or report to ED if SOI arise/worsens      Subjective:      Patient ID: Tamar Guthrie is a 70 y.o. male. Ed presents for f/u left 1st toe PNA nonperm. Still taking abx and toe is improving. Still some drainage on bandaid         The following portions of the patient's history were reviewed and updated as appropriate: allergies, current medications, past family history, past medical history, past social history, past surgical history, and problem list.    Review of Systems   Constitutional:  Negative for activity change, chills and fever. HENT: Negative. Respiratory:  Negative for cough, chest tightness and shortness of breath. Cardiovascular:  Negative for chest pain and leg swelling. Endocrine: Negative. Genitourinary: Negative. Neurological: Negative. Negative for numbness. Psychiatric/Behavioral: Negative. Negative for agitation and behavioral problems. Objective:      /82   Pulse (!) 51   Temp 98.3 °F (36.8 °C) (Temporal)   Ht 5' 10" (1.778 m)   Wt 110 kg (242 lb 6.4 oz)   SpO2 100%   BMI 34.78 kg/m²          Physical Exam  Cardiovascular:      Comments: Right DP pulses palpable, PT pulses diminished. Toes warm and well perfused. Musculoskeletal:         General: No tenderness.       Comments: S/p right 2nd toe partial amputation   Skin:     Findings: No erythema (Slight left 1st toe medial nail border resolved) or lesion (Right sub met 3 head ulceration healed. ). Comments: Superficial left 1st toe proximal border wound without purulence    Neurological:      Mental Status: He is oriented to person, place, and time. Comments: +peripheral neuropathy. Diminished protective sensation to feet.

## 2023-12-11 ENCOUNTER — OFFICE VISIT (OUTPATIENT)
Dept: PODIATRY | Age: 63
End: 2023-12-11
Payer: MEDICARE

## 2023-12-11 VITALS
TEMPERATURE: 98.6 F | HEIGHT: 70 IN | WEIGHT: 246.8 LBS | BODY MASS INDEX: 35.33 KG/M2 | DIASTOLIC BLOOD PRESSURE: 84 MMHG | HEART RATE: 65 BPM | OXYGEN SATURATION: 98 % | SYSTOLIC BLOOD PRESSURE: 132 MMHG

## 2023-12-11 DIAGNOSIS — E11.52 TYPE 2 DIABETES MELLITUS WITH DIABETIC PERIPHERAL ANGIOPATHY AND GANGRENE, WITHOUT LONG-TERM CURRENT USE OF INSULIN (HCC): ICD-10-CM

## 2023-12-11 DIAGNOSIS — L03.032 PARONYCHIA OF GREAT TOE OF LEFT FOOT: Primary | ICD-10-CM

## 2023-12-11 PROCEDURE — 99212 OFFICE O/P EST SF 10 MIN: CPT | Performed by: STUDENT IN AN ORGANIZED HEALTH CARE EDUCATION/TRAINING PROGRAM

## 2023-12-11 NOTE — PROGRESS NOTES
Assessment/Plan:       Diagnoses and all orders for this visit:    Paronychia of great toe of left foot    Type 2 diabetes mellitus with diabetic peripheral angiopathy and gangrene, without long-term current use of insulin ([facility])               IMPRESSION:  Left 1st toe medial border mild paronychia s/p PNA nonperm 11/13/23     PLAN:  Left 1st toe medial border s/p PNA nonperm. No open wound. Erythema resolved. Daily aquaphor to nail border recommended  F/u 4wks for at risk nail care      Subjective:      Patient ID: Edison Neely is a 70 y.o. male. Ed presents for f/u left 1st toe PNA nonperm. Doing very well, no drainage. No redness or tenderness        The following portions of the patient's history were reviewed and updated as appropriate: allergies, current medications, past family history, past medical history, past social history, past surgical history, and problem list.    Review of Systems   Constitutional:  Negative for activity change, chills and fever. HENT: Negative. Respiratory:  Negative for cough, chest tightness and shortness of breath. Cardiovascular:  Negative for chest pain and leg swelling. Endocrine: Negative. Genitourinary: Negative. Neurological: Negative. Negative for numbness. Psychiatric/Behavioral: Negative. Negative for agitation and behavioral problems. Objective:      /84   Pulse 65   Temp 98.6 °F (37 °C) (Temporal)   Ht 5' 10" (1.778 m)   Wt 112 kg (246 lb 12.8 oz)   SpO2 98%   BMI 35.41 kg/m²          Physical Exam  Cardiovascular:      Comments: Right DP pulses palpable, PT pulses diminished. Toes warm and well perfused. Musculoskeletal:         General: No tenderness. Comments: S/p right 2nd toe partial amputation   Skin:     Findings: No erythema (Slight left 1st toe medial nail border resolved) or lesion (Right sub met 3 head ulceration healed. ).       Comments: Healed Superficial left 1st toe proximal border wound without purulence    Neurological:      Mental Status: He is oriented to person, place, and time. Comments: +peripheral neuropathy. Diminished protective sensation to feet.

## 2024-01-17 ENCOUNTER — OFFICE VISIT (OUTPATIENT)
Dept: PODIATRY | Age: 64
End: 2024-01-17
Payer: MEDICARE

## 2024-01-17 VITALS
DIASTOLIC BLOOD PRESSURE: 82 MMHG | WEIGHT: 244.2 LBS | BODY MASS INDEX: 34.96 KG/M2 | TEMPERATURE: 98.2 F | OXYGEN SATURATION: 98 % | HEIGHT: 70 IN | SYSTOLIC BLOOD PRESSURE: 138 MMHG | HEART RATE: 63 BPM

## 2024-01-17 DIAGNOSIS — E11.52 TYPE 2 DIABETES MELLITUS WITH DIABETIC PERIPHERAL ANGIOPATHY AND GANGRENE, WITHOUT LONG-TERM CURRENT USE OF INSULIN (HCC): Primary | ICD-10-CM

## 2024-01-17 DIAGNOSIS — B35.1 ONYCHOMYCOSIS: ICD-10-CM

## 2024-01-17 DIAGNOSIS — L84 CALLUS: ICD-10-CM

## 2024-01-17 PROCEDURE — 11721 DEBRIDE NAIL 6 OR MORE: CPT | Performed by: STUDENT IN AN ORGANIZED HEALTH CARE EDUCATION/TRAINING PROGRAM

## 2024-01-17 PROCEDURE — 11056 PARNG/CUTG B9 HYPRKR LES 2-4: CPT | Performed by: STUDENT IN AN ORGANIZED HEALTH CARE EDUCATION/TRAINING PROGRAM

## 2024-01-17 NOTE — PROGRESS NOTES
Stalin SHAW Gage  1960  AT RISK FOOT CARE    1. Type 2 diabetes mellitus with diabetic peripheral angiopathy and gangrene, without long-term current use of insulin (HCC)        2. Onychomycosis        3. Callus                Patient presents for at-risk foot care.  Patient has no acute concerns.  Patient has significant lower extremity risk due to diminished pulses in the feet and trophic skin changes to the lower extremity including thick toenail, atrophic skin, and decreased hair growth. Prior right 2nd toe partial amputation and partial amputation 3rd ray right foot.    On exam patient has thickened, hypertrophic, discolored, brittle toenails with subungual debris and tenderness x8   Callus: 2 (R submet 2, L submet 1)  Amputation: 2 (right 2nd toe partial amputation and partial amputation 3rd ray right foot)    Today's treatment includes:  1. Debridement of toenails x8. Using nail nipper, ld, and curette, nails were sharply debrided, reduced in thickness and length. Devitalized nail tissue and fungal debris excised and removed. Patient tolerated well.    2. Paring of callus x2. Using a #15 blade the hyperkeratotic tissue below the 2nd metatarsal head right foot was pared in thickness to normal epithelium. I recommended Dr. Rivera's Gels silicone metatarsal pad and tendon achilles stretching for equinus deformity to reduce pressure to forefoot.    Discussed proper shoe gear, daily inspections of feet, and general foot health with patient.   Patient has Q7 findings and is recommended for at risk foot care every 9-10 weeks. DFE due next appt.     Patients most recent complete clinical exam was performed: 3/15/2023

## 2024-03-27 ENCOUNTER — OFFICE VISIT (OUTPATIENT)
Dept: PODIATRY | Age: 64
End: 2024-03-27
Payer: COMMERCIAL

## 2024-03-27 VITALS
OXYGEN SATURATION: 98 % | TEMPERATURE: 98.7 F | BODY MASS INDEX: 35.33 KG/M2 | HEIGHT: 70 IN | SYSTOLIC BLOOD PRESSURE: 134 MMHG | WEIGHT: 246.8 LBS | DIASTOLIC BLOOD PRESSURE: 80 MMHG | HEART RATE: 62 BPM

## 2024-03-27 DIAGNOSIS — G63 POLYNEUROPATHY ASSOCIATED WITH UNDERLYING DISEASE (HCC): ICD-10-CM

## 2024-03-27 DIAGNOSIS — Z89.431 STATUS POST AMPUTATION OF RIGHT FOOT THROUGH METATARSAL BONE (HCC): ICD-10-CM

## 2024-03-27 DIAGNOSIS — E11.52 TYPE 2 DIABETES MELLITUS WITH DIABETIC PERIPHERAL ANGIOPATHY AND GANGRENE, WITHOUT LONG-TERM CURRENT USE OF INSULIN (HCC): Primary | ICD-10-CM

## 2024-03-27 DIAGNOSIS — B35.1 ONYCHOMYCOSIS: ICD-10-CM

## 2024-03-27 DIAGNOSIS — L84 CALLUS: ICD-10-CM

## 2024-03-27 PROCEDURE — 99213 OFFICE O/P EST LOW 20 MIN: CPT | Performed by: STUDENT IN AN ORGANIZED HEALTH CARE EDUCATION/TRAINING PROGRAM

## 2024-03-27 PROCEDURE — 11056 PARNG/CUTG B9 HYPRKR LES 2-4: CPT | Performed by: STUDENT IN AN ORGANIZED HEALTH CARE EDUCATION/TRAINING PROGRAM

## 2024-03-27 PROCEDURE — 11721 DEBRIDE NAIL 6 OR MORE: CPT | Performed by: STUDENT IN AN ORGANIZED HEALTH CARE EDUCATION/TRAINING PROGRAM

## 2024-03-27 RX ORDER — AMMONIUM LACTATE 12 G/100G
CREAM TOPICAL 2 TIMES DAILY
Qty: 385 G | Refills: 3 | Status: SHIPPED | OUTPATIENT
Start: 2024-03-27

## 2024-03-27 NOTE — PROGRESS NOTES
Stalin Almonte  1960  AT RISK FOOT CARE    1. Type 2 diabetes mellitus with diabetic peripheral angiopathy and gangrene, without long-term current use of insulin ([facility])  Diabetic Shoe Inserts    Diabetic Shoe      2. Onychomycosis        3. Callus  ammonium lactate (LAC-HYDRIN) 12 % cream    Diabetic Shoe Inserts    Diabetic Shoe      4. 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)  Diabetic Shoe Inserts    Diabetic Shoe      5. Polyneuropathy associated with underlying disease ([facility])  Ambulatory referral to Spine & Pain Management          Patient presents for at-risk foot care.  Patient notes his neuropathy is becoming more painful.  Patient has significant lower extremity risk due to diminished pulses in the feet and trophic skin changes to the lower extremity including thick toenail, atrophic skin, and decreased hair growth. Prior right 2nd toe partial amputation and partial amputation 3rd ray right foot. Due to DFE yearly.     On exam patient has thickened, hypertrophic, discolored, brittle toenails with subungual debris and tenderness x8   Callus: 2 (R submet 2, L submet 1)  Amputation: 2 (right 2nd toe partial amputation and partial amputation 3rd ray right foot)    Today's treatment includes:  1. Debridement of toenails x8. Using nail nipper, ld, and curette, nails were sharply debrided, reduced in thickness and length. Devitalized nail tissue and fungal debris excised and removed. Patient tolerated well.    2. Paring of callus x2. Using a #15 blade the hyperkeratotic tissue below the 2nd metatarsal head right foot was pared in thickness to normal epithelium. I recommended Dr. Rivera's Gels silicone metatarsal pad and tendon achilles stretching for equinus deformity to reduce pressure to forefoot.   3. Amlactin rx'd for 2x daily use to calluses  4. Pain mngmt referral given for painful PN  5. DM shoes and inserts rx'd (R submet 2 and L submet 1 head cutouts)    Discussed proper shoe  gear, daily inspections of feet, and general foot health with patient.   Patient has Q7 findings and is recommended for at risk foot care every 9-10 weeks.     Patients most recent complete clinical exam was performed today 3/27/24      Diabetic Foot Exam    Patient's shoes and socks removed.    Right Foot/Ankle   Right Foot Inspection      Toe Exam: right toe deformity (HTs).     Sensory   Vibration: absent  Proprioception: absent  Monofilament testing: absent    Vascular  Capillary refills: < 3 seconds      Left Foot/Ankle  Left Foot Inspection      Toe Exam: left toe deformity (right 2nd toe partial amputation and partial amputation 3rd ray right foot. HTs).     Sensory   Vibration: absent  Proprioception: absent  Monofilament testing: absent    Vascular  Capillary refills: < 3 seconds

## 2024-04-12 ENCOUNTER — CONSULT (OUTPATIENT)
Dept: PAIN MEDICINE | Facility: CLINIC | Age: 64
End: 2024-04-12

## 2024-04-12 VITALS
HEART RATE: 71 BPM | SYSTOLIC BLOOD PRESSURE: 158 MMHG | WEIGHT: 245 LBS | TEMPERATURE: 97.8 F | RESPIRATION RATE: 18 BRPM | OXYGEN SATURATION: 98 % | DIASTOLIC BLOOD PRESSURE: 92 MMHG | HEIGHT: 70 IN | BODY MASS INDEX: 35.07 KG/M2

## 2024-04-12 DIAGNOSIS — E11.52 TYPE 2 DIABETES MELLITUS WITH DIABETIC PERIPHERAL ANGIOPATHY AND GANGRENE, WITHOUT LONG-TERM CURRENT USE OF INSULIN (HCC): Primary | ICD-10-CM

## 2024-04-12 DIAGNOSIS — G63 POLYNEUROPATHY ASSOCIATED WITH UNDERLYING DISEASE (HCC): ICD-10-CM

## 2024-04-12 DIAGNOSIS — E66.01 SEVERE OBESITY WITH BODY MASS INDEX (BMI) OF 35.0 TO 39.9 WITH SERIOUS COMORBIDITY (HCC): ICD-10-CM

## 2024-04-12 DIAGNOSIS — E11.21 DIABETIC NEPHROPATHY ASSOCIATED WITH TYPE 2 DIABETES MELLITUS (HCC): ICD-10-CM

## 2024-04-12 RX ORDER — GABAPENTIN 300 MG/1
300 CAPSULE ORAL 3 TIMES DAILY
Qty: 90 CAPSULE | Refills: 2 | Status: SHIPPED | OUTPATIENT
Start: 2024-04-12

## 2024-04-12 NOTE — PROGRESS NOTES
Assessment  1. Type 2 diabetes mellitus with diabetic peripheral angiopathy and gangrene, without long-term current use of insulin (HCC)  -     gabapentin (NEURONTIN) 300 mg capsule; Take 1 capsule (300 mg total) by mouth 3 (three) times a day    2. Polyneuropathy associated with underlying disease (HCC)  -     Ambulatory referral to [facility]  -     gabapentin (NEURONTIN) 300 mg capsule; Take 1 capsule (300 mg total) by mouth 3 (three) times a day    3. Diabetic nephropathy associated with type 2 diabetes mellitus (HCC)  -     gabapentin (NEURONTIN) 300 mg capsule; Take 1 capsule (300 mg total) by mouth 3 (three) times a day    4. Severe obesity with body mass index (BMI) of 35.0 to 39.9 with serious comorbidity (HCC)    Describes elements of peripheral polyneuropathy in stocking glove distribution of pain extending distally from b/l lower extremities proximally. Pins and needles like lancinating pain that is constant; difficulty with proprioception and temperature sensation. Discussed glucose control, working with PCP for better daily management, diet, exercise, weight loss, risks, benefits and alternatives to gabapentin, lyrica, duloxetine as well as SCS trial. Handouts provided, questions answered to patient satisfaction.    Plan  -f/u 4 weeks  -gabapentin increased to 300 mg t.i.d. Ordered for patient; counseled regarding sedative effects of taking this medication and provided up titration calendar.  Counseled not to take medication while driving or operating heavy machinery/using stairs  -handouts provided for scs trial, gabapentin, duloxetine, lyrica  -f/u podiatry, pcp    There are risks associated with opioid medications, including dependence, addiction and tolerance. The patient understands and agrees to use these medications only as prescribed. Potential side effects of the medications include, but are not limited to, constipation, drowsiness, addiction, impaired judgment and risk of  fatal overdose if not taken as prescribed. The patient was warned against driving while taking sedation medications or operating heavy machinery. The patient voiced understanding. Sharing medications is a felony. At this point in time, the patient is showing no signs of addiction, abuse, diversion or suicidal ideation.     [place] Prescription Drug Monitoring Program report was reviewed and was appropriate      Complete risks and benefits including bleeding, infection, tissue reaction, nerve injury and allergic reaction were discussed. The approach was demonstrated using models and literature was provided. Verbal and written consent was obtained.     My impressions and treatment recommendations were discussed in detail with the patient who verbalized understanding and had no further questions.  Discharge instructions were provided. I personally saw and examined the patient and I agree with the above discussed plan of care.    New Medications Ordered This Visit   Medications    gabapentin (NEURONTIN) 300 mg capsule     Sig: Take 1 capsule (300 mg total) by mouth 3 (three) times a day     Dispense:  90 capsule     Refill:  2       History of Present Illness    Stalin Almonte is a 64 y.o. male with pmhx of DM-2 (uncontrolled noninsulin dependent with hgba1c 10%), HTN, hypothyroidism, XOL presenting with elements of peripheral polyneuropathy in stocking glove distribution of pain extending distally from b/l lower extremities proximally. Pins and needles like lancinating pain that is constant; difficulty with proprioception and temperature sensation. The patient rates the pain at a 8/10 accompanied by electric shock-like shooting features and crampy burning pain in no specific dermatomal distribution. The pain is worse in the mornings as well as the end of the day; exertion such as walking for long periods of time seems to exacerbate the pain.  The patient can hardly walk more than a few blocks without having  debilitating pain.  He tries to maintain an active lifestyle and finds that the current degree of pain seems to compromises his efforts.  The pain significantly impacts independent activities of daily living and contributes to significant disability.  He has attempted physical therapy with exacerbation of the pain.  He has taken naproxen, tylenol as well as gabapentin with limited relief of the pain as well.  He has never tried epidural steroid injections in the past. He denies any bowel or bladder dysfunction/incontinence, saddle anesthesia or gait instability.    I have personally reviewed and/or updated the patient's past medical history, past surgical history, family history, social history, current medications, allergies, and vital signs today.     Review of Systems   Constitutional:  Positive for activity change.   HENT: Negative.     Eyes: Negative.    Respiratory: Negative.     Cardiovascular: Negative.    Gastrointestinal: Negative.    Endocrine: Negative.    Genitourinary: Negative.    Musculoskeletal:  Positive for arthralgias, back pain and myalgias. Negative for gait problem.   Skin: Negative.    Allergic/Immunologic: Negative.    Neurological:  Positive for numbness. Negative for weakness.   Hematological: Negative.    Psychiatric/Behavioral: Negative.     All other systems reviewed and are negative.      Patient Active Problem List   Diagnosis    Osteomyelitis of foot, right, acute (HCC)    Acquired hypothyroidism    Stage 4 chronic kidney disease (HCC)    Essential hypertension    Chronic anemia    Type 2 diabetes mellitus with circulatory disorder, without long-term current use of insulin (HCC)    GERD (gastroesophageal reflux disease)    Severe obesity with body mass index (BMI) of 35.0 to 39.9 with serious comorbidity (HCC)    Cellulitis of right toe    Hyperkalemia    Diabetic nephropathy associated with type 2 diabetes mellitus (HCC)    Benign hypertension with CKD (chronic kidney disease) stage  III (HCC)    Persistent proteinuria    Callus    Type 2 diabetes mellitus with diabetic peripheral angiopathy and gangrene, without long-term current use of insulin (HCC)    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)       Past Medical History:   Diagnosis Date    Chronic kidney disease 2010    Chronic kidney failure, stage 4     COVID-19     2019    Diabetes mellitus (HCC)     GERD (gastroesophageal reflux disease)     Gout     Hypertension        Past Surgical History:   Procedure Laterality Date    ANKLE FRACTURE SURGERY Right     BONE BIOPSY Right 2022    Procedure: Right 3rd metatarsal head bone biopsy;  Surgeon: Traci Messer DPM;  Location: [facility];  Service: Podiatry    CHOLECYSTECTOMY      AZ AMPUTATION METATARSAL W/TOE SINGLE Right 2022    Procedure: Partial 3rd RAY excision FOOT, plantar wound excision;  Surgeon: Traci Messer DPM;  Location: [facility];  Service: Podiatry    TOE AMPUTATION Right 2022    Procedure: RIGHT 2ND TOE PARTIAL AMPUTATION and right foot wound debridement;  Surgeon: Traci Messer DPM;  Location: [facility];  Service: Podiatry       Family History   Problem Relation Age of Onset    Gout Mother     Diabetes Father     Heart disease Father        Social History     Occupational History    Not on file   Tobacco Use    Smoking status: Former     Current packs/day: 0.00     Average packs/day: 1 pack/day for 15.3 years (15.3 ttl pk-yrs)     Types: Cigarettes     Start date: 3/1/1975     Quit date: 1990     Years since quittin.8    Smokeless tobacco: Never   Vaping Use    Vaping status: Never Used   Substance and Sexual Activity    Alcohol use: Not Currently    Drug use: Never    Sexual activity: Yes     Partners: Female     Birth control/protection: None       Current Outpatient Medications on File Prior to Visit   Medication Sig    allopurinol (ZYLOPRIM) 100 mg tablet Take 100 mg by mouth daily    ammonium lactate (LAC-HYDRIN)  12 % cream Apply topically 2 (two) times a day To dry skin of feet and calluses    aspirin (ECOTRIN LOW STRENGTH) 81 mg EC tablet Take 81 mg by mouth daily    atorvastatin (LIPITOR) 20 mg tablet Take 20 mg by mouth see administration instructions 3x a week, Monday, Wednesday, and Friday    bisacodyl (DULCOLAX) 5 mg EC tablet Take 1 tablet (5 mg total) by mouth daily as needed for constipation    co-enzyme Q-10 100 mg capsule Take 100 mg by mouth daily    glipiZIDE (GLUCOTROL XL) 5 mg 24 hr tablet Take 5 mg by mouth daily    levothyroxine 150 mcg tablet TAKE 1 TABLET DAILY AT LEAST 30 MINUTES PRIOR TO BREAKFAST OR OTHER MEDICATIONS (REPLACES 137 MCG)    linaGLIPtin 5 MG TABS Take 5 mg by mouth daily    losartan (COZAAR) 25 mg tablet Take 1 tablet (25 mg total) by mouth daily    metoprolol succinate (TOPROL-XL) 100 mg 24 hr tablet Take 100 mg by mouth daily    Multiple Vitamins-Minerals (multivitamin with minerals) tablet Take 1 tablet by mouth daily    omeprazole (PriLOSEC) 20 mg delayed release capsule 20 mg daily    [DISCONTINUED] gabapentin (NEURONTIN) 300 mg capsule TAKE 1 CAPSULE BY MOUTH EVERY DAY    [DISCONTINUED] atenolol (TENORMIN) 50 mg tablet Take by mouth    [DISCONTINUED] ferrous sulfate 324 (65 Fe) mg TAKE 1 TABLET (324 MG TOTAL) BY MOUTH EVERY OTHER DAY (Patient not taking: Reported on 4/12/2024)     No current facility-administered medications on file prior to visit.       Allergies   Allergen Reactions    Aspirin Other (See Comments)     Not an allergy, take off as precaution for the kidneys per patient.     Other Dermatitis    Felodipine Other (See Comments)     Dizzy      Lisinopril Other (See Comments)     hyperkalemia      Niacin Itching     flushing and itching      Nsaids Other (See Comments)     Low GFR    Simvastatin Other (See Comments)     pancreatitis         Physical Exam    /92 (BP Location: Left arm, Patient Position: Sitting, Cuff Size: Adult)   Pulse 71   Temp 97.8 °F (36.6  "°C)   Resp 18   Ht 5' 10\" (1.778 m)   Wt 111 kg (245 lb)   SpO2 98%   BMI 35.15 kg/m²     Constitutional: normal, well developed, well nourished, alert, in no distress and non-toxic and no overt pain behavior. and obese  Eyes: anicteric  HEENT: grossly intact  Neck: supple, symmetric, trachea midline and no masses   Pulmonary:even and unlabored  Cardiovascular:No edema or pitting edema present  Skin:Normal without rashes or lesions and well hydrated  Psychiatric:Mood and affect appropriate  Neurologic:Cranial Nerves II-XII grossly intact Sensation grossly intact; no clonus negative omalley's. Reflexes 2+ and brisk. SLR negative bilaterally. Spurling's maneuver negative bilaterally.  Musculoskeletal:normal gait. 5/5 strength bilaterally with AROM in all extremities. Normal heel toe and tip toe walking. No pain with lumbar facet loading bilaterally and with lateral spine rotation. No ttp over lumbar paraspinal muscles. Negative kassandra's test, negative gaenslen's negative SIJ loading bilaterally.    Imaging    No pertinent imaging to review at this time    "

## 2024-04-12 NOTE — PATIENT INSTRUCTIONS
Duloxetine (By mouth)   Duloxetine (doo-LOX-e-teen)  Treats depression, anxiety, diabetic peripheral neuropathy, fibromyalgia, and chronic muscle or bone pain. This medicine is a SNRI.   Brand Name(s): Cymbalta, Drizalma Sprinkle, Irenka   There may be other brand names for this medicine.  When This Medicine Should Not Be Used:   This medicine is not right for everyone. Do not use it if you had an allergic reaction to duloxetine.  How to Use This Medicine:   Capsule, Delayed Release Capsule  Take your medicine as directed. Your dose may need to be changed several times to find what works best for you.  Delayed-release capsule: Swallow the capsule whole. Do not crush, chew, break, or open it. Do not open the Cymbalta® delayed-release capsule and sprinkle the contents on food or in liquids.  If you have trouble swallowing the Drizalma Sprinkle™ delayed release capsule:   You may open the capsule and sprinkle the contents over one tablespoon (15 mL) of applesauce. Swallow the mixture right away and do not save any of the mixture to use later.  You may open the capsule and pour the contents to an all plastic catheter tip syringe and add 50 mL of water. Do not use other liquids. Gently shake it for 10 seconds, and then use it through a nasogastric tube. Rinse with additional water (about 15 mL) if needed.  This medicine should come with a Medication Guide. Ask your pharmacist for a copy if you do not have one.  Missed dose: Take a dose as soon as you remember. If it is almost time for your next dose, wait until then and take a regular dose. Do not take extra medicine to make up for a missed dose.  Store the medicine in a closed container at room temperature, away from heat, moisture, and direct light.  Drugs and Foods to Avoid:   Ask your doctor or pharmacist before using any other medicine, including over-the-counter medicines, vitamins, and herbal products.  Do not take duloxetine if you have used an MAO inhibitor  (MAOI) within the past 14 days. Do not start taking an MAO inhibitor within 5 days of stopping duloxetine. Ask your doctor if you are not sure if you take an MAOI, including linezolid or methylene blue injection.  Some medicines can affect how duloxetine works. Tell your doctor if you are using any of the following:  Buspirone, cimetidine, ciprofloxacin, enoxacin, fentanyl, fluvoxamine, lithium, Theron's wort, theophylline, tramadol, tryptophan, warfarin  Amphetamines  Blood pressure medicine  Diuretic (water pill)  Medicine for heart rhythm problems (including flecainide, propafenone, quinidine)  Medicine to treat migraine headaches (including triptans)  NSAID pain or arthritis medicine (including aspirin, celecoxib, diclofenac, ibuprofen, naproxen)  Other medicine to treat depression or mood disorders (including amitriptyline, desipramine, fluoxetine, imipramine, nortriptyline, paroxetine)  Phenothiazine medicine (including thioridazine)  Stomach medicine (including famotidine, antacids containing aluminum or magnesium, PPIs)  Tell your doctor if you use anything else that makes you sleepy. Some examples are allergy medicine, narcotic pain medicine, and alcohol.  Do not drink alcohol while you are using this medicine.  Warnings While Using This Medicine:   Tell your doctor if you are pregnant or breastfeeding, or if you have kidney disease, liver disease, bleeding problems, diabetes, digestion problems, glaucoma, heart disease, high or low blood pressure, or problems with urination. Tell your doctor if you smoke or you have a history of seizures, mental health problems (including bipolar disorder, frederic), or drug or alcohol addiction.  This medicine may cause the following problems:   Serious liver problems  Serotonin syndrome, when used with certain medicines  Increased risk of bleeding problems  Serious skin reactions, including erythema multiforme, Shepherd-Roberto syndrome  Low sodium levels in the  blood  Sexual problems  This medicine can increase thoughts of suicide. Tell your doctor right away if you start to feel depressed and have thoughts about hurting yourself.  This medicine may cause blurred vision, dizziness, drowsiness, trouble with thinking, or trouble with controlling body movements, which may lead to falls, fractures, or other injuries. Do not drive or do anything that could be dangerous until you know how this medicine affects you. Stand up slowly to avoid falls.  Do not stop using this medicine suddenly. Your doctor will need to slowly decrease your dose before you stop it completely.  Your doctor will check your progress and the effects of this medicine at regular visits. Keep all appointments.  Keep all medicine out of the reach of children. Never share your medicine with anyone.  Possible Side Effects While Using This Medicine:   Call your doctor right away if you notice any of these side effects:  Allergic reaction: Itching or hives, swelling in your face or hands, swelling or tingling in your mouth or throat, chest tightness, trouble breathing  Anxiety, restlessness, fever, fast heartbeat, sweating, muscle spasms, diarrhea, seeing or hearing things that are not there  Blistering, peeling, red skin rash  Confusion, weakness, muscle twitching  Dark urine or pale stools, nausea, vomiting, loss of appetite, stomach pain, yellow skin or eyes  Decrease in how much or how often you urinate  Decrease in sexual ability, desire, drive, or performance  Eye pain, vision changes, seeing halos around lights  Feeling more energetic than usual  Lightheadedness, dizziness, fainting  Unusual moods or behaviors, worsening depression, thoughts about hurting yourself, trouble sleeping  Unusual bleeding or bruising  If you notice these less serious side effects, talk with your doctor:   Decrease in appetite or weight  Dry mouth, constipation, mild nausea  Headache  Unusual drowsiness, sleepiness, or  tiredness  If you notice other side effects that you think are caused by this medicine, tell your doctor.   Call your doctor for medical advice about side effects. You may report side effects to FDA at 2-865-FDA-0384  © Copyright [employer] 2023 Information is for End User's use only and may not be sold, redistributed or otherwise used for commercial purposes.  The above information is an  only. It is not intended as medical advice for individual conditions or treatments. Talk to your doctor, nurse or pharmacist before following any medical regimen to see if it is safe and effective for you.  Pregabalin (By mouth)   Pregabalin (pre-GA-ba-rafat)  Treats nerve and muscle pain, including fibromyalgia. Also treats partial-onset seizures.   Brand Name(s): Lyrica, Lyrica CR   There may be other brand names for this medicine.  When This Medicine Should Not Be Used:   This medicine is not right for everyone. Do not use it if you had an allergic reaction to pregabalin.  How to Use This Medicine:   Capsule, Liquid, Long Acting Tablet  Take your medicine as directed. Your dose may need to be changed several times to find what works best for you.  Extended-release tablet: Swallow the extended-release tablet whole. Do not crush, break, or chew it. Take it after an evening meal.  Oral liquid: Measure the oral liquid medicine with a marked measuring spoon, oral syringe, or medicine cup.  This medicine should come with a Medication Guide. Ask your pharmacist for a copy if you do not have one.  Missed dose: Take a dose as soon as you remember. If it is almost time for your next dose, wait until then and take a regular dose. Do not take extra medicine to make up for a missed dose. If you miss a dose of the extended-release tablet after your evening meal, take it before bedtime after a snack. If you miss the dose before bedtime, take it after your morning meal. If you do not take the dose the following morning, then take  the next dose at your regular time after your evening meal. Do not take 2 doses at the same time.  Store the medicine in a closed container at room temperature, away from heat, moisture, and direct light.  Drugs and Foods to Avoid:   Ask your doctor or pharmacist before using any other medicine, including over-the-counter medicines, vitamins, and herbal products.  Some medicines can affect how pregabalin works. Tell your doctor if you are using any of the following:   ACE inhibitor (including benazepril, enalapril, lisinopril, quinapril, ramipril)  Oral diabetes medicine (including metformin, pioglitazone, rosiglitazone)  Do not drink alcohol while you are using this medicine.  Tell your doctor if you use anything else that makes you sleepy. Some examples are allergy medicine, narcotic pain medicine, and alcohol. Tell your doctor if you are also using oxycodone, lorazepam, or zolpidem.  Warnings While Using This Medicine:   Tell your doctor if you are pregnant or breastfeeding, or if you have kidney disease, heart failure, heart rhythm problems, lung or breathing problems, a bleeding disorder, diabetes, sores or skin problems, or a low blood platelet count. Tell your doctor if you have a history of angioedema (severe swelling), alcohol or drug abuse, depression, or other mood problems.  This medicine may cause the following problems:   Angioedema (severe swelling), which may be life-threatening  Changes in mood or behavior, including suicidal thoughts or behavior  Respiratory depression (serious breathing problem that can be life-threatening), when used with narcotic pain medicines  Peripheral edema (swelling of your hands, ankles, feet, or lower legs)  Increased risk for cancer and bleeding  Serious muscle problems  Heart rhythm changes  This medicine may make you dizzy or drowsy. It may also cause blurry or double vision. Do not drive or do anything else that could be dangerous until you know how this medicine  affects you.  Do not stop using this medicine suddenly. Your doctor will need to slowly decrease your dose before you stop it completely.  Your doctor will do lab tests at regular visits to check on the effects of this medicine. Keep all appointments.  Keep all medicine out of the reach of children. Never share your medicine with anyone.  Possible Side Effects While Using This Medicine:   Call your doctor right away if you notice any of these side effects:  Allergic reaction: Itching or hives, swelling in your face or hands, swelling or tingling in your mouth or throat, chest tightness, trouble breathing  Blistering, peeling, red skin rash  Blue lips, fingernails, or skin, trouble breathing, chest pain  Blurry or double vision  Fever, chills, cough, sore throat, body aches  Muscle pain, tenderness, or weakness, general feeling of illness  Rapid weight gain, swelling in your hands, ankles, or feet  Severe dizziness or drowsiness  Sudden mood changes, unusual moods or behavior, including extreme happiness or depression, thoughts or attempts of killing oneself  Swelling in your throat, head, or neck  Uneven heartbeat  Unusual bleeding, bruising, or weakness  If you notice these less serious side effects, talk with your doctor:   Confusion, trouble concentrating  Constipation  Dry mouth  If you notice other side effects that you think are caused by this medicine, tell your doctor.   Call your doctor for medical advice about side effects. You may report side effects to FDA at 0-998-FDA-5217  © Copyright [employer] 2023 Information is for End User's use only and may not be sold, redistributed or otherwise used for commercial purposes.  The above information is an  only. It is not intended as medical advice for individual conditions or treatments. Talk to your doctor, nurse or pharmacist before following any medical regimen to see if it is safe and effective for you.    Spinal Cord Stimulator Placement   WHAT  YOU NEED TO KNOW:   What do I need to know about spinal cord stimulator placement?  A spinal cord stimulator (SCS) is a device used to control pain after other treatments have not worked. The SCS delivers a small amount of electrical current to your spinal cord to block pain. SCS placement surgery is done in 2 stages. In the first stage, a temporary SCS is placed and left in for about a week. In the second stage, a permanent SCS is placed if the temporary device reduced your pain. You will get a remote control to turn the pulse generator on and off and adjust the pulses.  How do I prepare for surgery?   Your surgeon will tell you how to prepare. He or she may tell you not to eat or drink anything after midnight on the day of surgery. Arrange to have someone drive you home when you are discharged from the hospital.    Tell your surgeon about all medicines you currently take. Be sure to include any medicine that may cause you to bleed more, such as aspirin or blood thinners. Your surgeon will tell you if you need to stop any of your medicine for the surgery, and when to stop. He or she will tell you which medicines to take or not take on the day of surgery.    Tell your surgeon if you have a blood disorder or had a bleeding problem.    You may need blood or urine tests to check for infection and make sure your body is okay for surgery. You may also need an MRI to check your spine before your healthcare provider places the SCS. Ask your surgeon for more information about these and other tests you may need.    What will happen during surgery?   Temporary lead placement:      You may get general anesthesia to keep you asleep during surgery. You may get local anesthesia to numb the surgery area. Local anesthesia allows you to stay awake during the surgery so you can tell your surgeon when your pain decreases. This helps him or her make sure the SCS is in the best spot.    Your surgeon will place electrical leads through a  small incision or a needle inserted into your back. He or she may need to remove a small piece of bone to insert the leads along your spine. He or she will use an x-ray to make sure the leads are in the correct place.    The incision will be covered with bandages. The leads will be connected to wires and attached to a pulse generator placed outside your body.    Permanent lead placement:      Your surgeon will remove the temporary lead and replace it with a new, permanent lead through an incision or needle in your back.    He or she will make another incision in your abdomen or buttocks. Then he or she will make a small pocket under your skin and place the SCS. Wires will be attached to the SCS. The wires will be tunneled under your skin. Your surgeon will connect the wires to the leads placed in your spine.    The incisions will be closed with stitches and covered with a bandage.    What should I expect after surgery?  You will be taken to a room to rest until you are fully awake. Healthcare providers will monitor you closely for any problems. Do not get out of bed until your healthcare provider says it is okay.  SCS settings  on the remote control can be changed to help relieve your pain. Your healthcare provider will show you how to adjust the settings.    Medicines  may be given for surgery pain or to prevent a bacterial infection.    Ice packs  may be used to decrease the pain from the incisions.    What are the risks of spinal cord stimulator placement?  The spinal cord stimulator may not work correctly and you may need to have it replaced. You may develop a headache, or your pain may get worse. Surgery may cause you to bleed or to leak spinal fluid. After surgery, you may get an infection at the incision site. You may also get a serious infection near where the leads are placed. This may cause paralysis or become life-threatening.  CARE AGREEMENT:   You have the right to help plan your care. Learn about your  health condition and how it may be treated. Discuss treatment options with your healthcare providers to decide what care you want to receive. You always have the right to refuse treatment. The above information is an  only. It is not intended as medical advice for individual conditions or treatments. Talk to your doctor, nurse or pharmacist before following any medical regimen to see if it is safe and effective for you.  © Copyright [employer] 2023 Information is for End User's use only and may not be sold, redistributed or otherwise used for commercial purposes.

## 2024-05-15 ENCOUNTER — TELEPHONE (OUTPATIENT)
Age: 64
End: 2024-05-15

## 2024-05-15 RX ORDER — INSULIN GLARGINE 100 [IU]/ML
15 INJECTION, SOLUTION SUBCUTANEOUS
COMMUNITY
Start: 2024-05-08

## 2024-05-15 NOTE — TELEPHONE ENCOUNTER
Caller: Stalin Almonte    Doctor and/or Office: /Tom    #: [phone]    Escalation: Care Since the patients script was given for diabetic shoes, he is now on insulin. Will this affect the shoes? He is requesting a return call to discuss. Thank you

## 2024-05-17 ENCOUNTER — OFFICE VISIT (OUTPATIENT)
Dept: PAIN MEDICINE | Facility: CLINIC | Age: 64
End: 2024-05-17
Payer: COMMERCIAL

## 2024-05-17 VITALS
HEART RATE: 88 BPM | HEIGHT: 70 IN | WEIGHT: 244 LBS | BODY MASS INDEX: 34.93 KG/M2 | OXYGEN SATURATION: 98 % | DIASTOLIC BLOOD PRESSURE: 88 MMHG | SYSTOLIC BLOOD PRESSURE: 144 MMHG | TEMPERATURE: 98.2 F | RESPIRATION RATE: 18 BRPM

## 2024-05-17 DIAGNOSIS — E11.42 DIABETIC PERIPHERAL NEUROPATHY (HCC): Primary | ICD-10-CM

## 2024-05-17 DIAGNOSIS — E11.21 DIABETIC NEPHROPATHY ASSOCIATED WITH TYPE 2 DIABETES MELLITUS (HCC): ICD-10-CM

## 2024-05-17 DIAGNOSIS — G63 POLYNEUROPATHY ASSOCIATED WITH UNDERLYING DISEASE (HCC): ICD-10-CM

## 2024-05-17 DIAGNOSIS — E11.52 TYPE 2 DIABETES MELLITUS WITH DIABETIC PERIPHERAL ANGIOPATHY AND GANGRENE, WITHOUT LONG-TERM CURRENT USE OF INSULIN (HCC): ICD-10-CM

## 2024-05-17 PROCEDURE — 99214 OFFICE O/P EST MOD 30 MIN: CPT | Performed by: ANESTHESIOLOGY

## 2024-05-17 PROCEDURE — G2211 COMPLEX E/M VISIT ADD ON: HCPCS | Performed by: ANESTHESIOLOGY

## 2024-05-17 RX ORDER — GABAPENTIN 300 MG/1
300 CAPSULE ORAL 3 TIMES DAILY
Qty: 270 CAPSULE | Refills: 3 | Status: SHIPPED | OUTPATIENT
Start: 2024-05-17

## 2024-05-17 NOTE — PROGRESS NOTES
Assessment  1. Diabetic peripheral neuropathy (HCC)  2. Polyneuropathy associated with underlying disease (HCC)  -     gabapentin (NEURONTIN) 300 mg capsule; Take 1 capsule (300 mg total) by mouth 3 (three) times a day  3. Type 2 diabetes mellitus with diabetic peripheral angiopathy and gangrene, without long-term current use of insulin (HCC)  -     gabapentin (NEURONTIN) 300 mg capsule; Take 1 capsule (300 mg total) by mouth 3 (three) times a day  4. Diabetic nephropathy associated with type 2 diabetes mellitus (HCC)  -     gabapentin (NEURONTIN) 300 mg capsule; Take 1 capsule (300 mg total) by mouth 3 (three) times a day    Greater than 90% relief of pain with improved ability to participate with IADLs after taking gabapentin 300mg BID without side effects. Has stared insulin since last visit. Encouraged continued weight loss, DM-2 sugar control, exercise. Previously reported the following symptomatology:     Describes elements of peripheral polyneuropathy in stocking glove distribution of pain extending distally from b/l lower extremities proximally. Pins and needles like lancinating pain that is constant; difficulty with proprioception and temperature sensation. Discussed glucose control, working with PCP for better daily management, diet, exercise, weight loss, risks, benefits and alternatives to gabapentin, lyrica, duloxetine as well as SCS trial. Handouts provided, questions answered to patient satisfaction.    Plan  -f/u one year or sooner should the need arise  -gabapentin to be continued at 300 mg t.i.d. Ordered for patient; (taking BID); counseled regarding sedative effects of taking this medication and provided up titration calendar.  Counseled not to take medication while driving or operating heavy machinery/using stairs  -handouts provided for scs trial, gabapentin, duloxetine, lyrica  -f/u podiatry, pcp    There are risks associated with opioid medications, including dependence, addiction and  tolerance. The patient understands and agrees to use these medications only as prescribed. Potential side effects of the medications include, but are not limited to, constipation, drowsiness, addiction, impaired judgment and risk of fatal overdose if not taken as prescribed. The patient was warned against driving while taking sedation medications or operating heavy machinery. The patient voiced understanding. Sharing medications is a felony. At this point in time, the patient is showing no signs of addiction, abuse, diversion or suicidal ideation.     [place] Prescription Drug Monitoring Program report was reviewed and was appropriate      Complete risks and benefits including bleeding, infection, tissue reaction, nerve injury and allergic reaction were discussed. The approach was demonstrated using models and literature was provided. Verbal and written consent was obtained.     My impressions and treatment recommendations were discussed in detail with the patient who verbalized understanding and had no further questions.  Discharge instructions were provided. I personally saw and examined the patient and I agree with the above discussed plan of care.    New Medications Ordered This Visit   Medications    Insulin Glargine Solostar 100 UNIT/ML SOPN     Sig: Inject 15 Units as directed daily at bedtime       History of Present Illness    Greater than 90% relief of pain with improved ability to participate with IADLs after taking gabapentin 300mg BID without side effects. Has stared insulin since last visit. Encouraged continued weight loss, DM-2 sugar control, exercise. Previously reported the following symptomatology:     Stalin Almonte is a 64 y.o. male with pmhx of DM-2 (uncontrolled noninsulin dependent with hgba1c 10%), HTN, hypothyroidism, XOL presenting with elements of peripheral polyneuropathy in stocking glove distribution of pain extending distally from b/l lower extremities proximally. Pins and  needles like lancinating pain that is constant; difficulty with proprioception and temperature sensation. The patient rates the pain at a 8/10 accompanied by electric shock-like shooting features and crampy burning pain in no specific dermatomal distribution. The pain is worse in the mornings as well as the end of the day; exertion such as walking for long periods of time seems to exacerbate the pain.  The patient can hardly walk more than a few blocks without having debilitating pain.  He tries to maintain an active lifestyle and finds that the current degree of pain seems to compromises his efforts.  The pain significantly impacts independent activities of daily living and contributes to significant disability.  He has attempted physical therapy with exacerbation of the pain.  He has taken naproxen, tylenol as well as gabapentin with limited relief of the pain as well.  He has never tried epidural steroid injections in the past. He denies any bowel or bladder dysfunction/incontinence, saddle anesthesia or gait instability.    I have personally reviewed and/or updated the patient's past medical history, past surgical history, family history, social history, current medications, allergies, and vital signs today.     Review of Systems   Constitutional:  Positive for activity change.   HENT: Negative.     Eyes: Negative.    Respiratory: Negative.     Cardiovascular: Negative.    Gastrointestinal: Negative.    Endocrine: Negative.    Genitourinary: Negative.    Musculoskeletal:  Positive for arthralgias, back pain and myalgias. Negative for gait problem.   Skin: Negative.    Allergic/Immunologic: Negative.    Neurological:  Positive for numbness. Negative for weakness.   Hematological: Negative.    Psychiatric/Behavioral: Negative.     All other systems reviewed and are negative.      Patient Active Problem List   Diagnosis    Osteomyelitis of foot, right, acute (HCC)    Acquired hypothyroidism    Stage 4 chronic  kidney disease (HCC)    Essential hypertension    Chronic anemia    Type 2 diabetes mellitus with circulatory disorder, without long-term current use of insulin (HCC)    GERD (gastroesophageal reflux disease)    Severe obesity with body mass index (BMI) of 35.0 to 39.9 with serious comorbidity (HCC)    Cellulitis of right toe    Hyperkalemia    Diabetic nephropathy associated with type 2 diabetes mellitus (HCC)    Benign hypertension with CKD (chronic kidney disease) stage III (HCC)    Persistent proteinuria    Callus    Type 2 diabetes mellitus with diabetic peripheral angiopathy and gangrene, without long-term current use of insulin (HCC)    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)       Past Medical History:   Diagnosis Date    Chronic kidney disease 2010    Chronic kidney failure, stage 4     COVID-19     12/2019    Diabetes mellitus (HCC)     GERD (gastroesophageal reflux disease)     Gout     Hypertension        Past Surgical History:   Procedure Laterality Date    ANKLE FRACTURE SURGERY Right     BONE BIOPSY Right 11/30/2022    Procedure: Right 3rd metatarsal head bone biopsy;  Surgeon: Traci Messer DPM;  Location: [facility];  Service: Podiatry    CHOLECYSTECTOMY      IA AMPUTATION METATARSAL W/TOE SINGLE Right 12/16/2022    Procedure: Partial 3rd RAY excision FOOT, plantar wound excision;  Surgeon: Traci Messer DPM;  Location: [facility];  Service: Podiatry    TOE AMPUTATION Right 11/2/2022    Procedure: RIGHT 2ND TOE PARTIAL AMPUTATION and right foot wound debridement;  Surgeon: Traci Messer DPM;  Location: [facility];  Service: Podiatry       Family History   Problem Relation Age of Onset    Gout Mother     Diabetes Father     Heart disease Father        Social History     Occupational History    Not on file   Tobacco Use    Smoking status: Former     Current packs/day: 0.00     Average packs/day: 1 pack/day for 15.3 years (15.3 ttl pk-yrs)     Types: Cigarettes     Start  date: 3/1/1975     Quit date: 1990     Years since quittin.9    Smokeless tobacco: Never   Vaping Use    Vaping status: Never Used   Substance and Sexual Activity    Alcohol use: Not Currently    Drug use: Never    Sexual activity: Yes     Partners: Female     Birth control/protection: None       Current Outpatient Medications on File Prior to Visit   Medication Sig    allopurinol (ZYLOPRIM) 100 mg tablet Take 100 mg by mouth daily    ammonium lactate (LAC-HYDRIN) 12 % cream Apply topically 2 (two) times a day To dry skin of feet and calluses    aspirin (ECOTRIN LOW STRENGTH) 81 mg EC tablet Take 81 mg by mouth daily    atorvastatin (LIPITOR) 20 mg tablet Take 20 mg by mouth see administration instructions 3x a week, Monday, Wednesday, and Friday    bisacodyl (DULCOLAX) 5 mg EC tablet Take 1 tablet (5 mg total) by mouth daily as needed for constipation    co-enzyme Q-10 100 mg capsule Take 100 mg by mouth daily    gabapentin (NEURONTIN) 300 mg capsule Take 1 capsule (300 mg total) by mouth 3 (three) times a day (Patient taking differently: Take 300 mg by mouth 2 (two) times a day)    Insulin Glargine Solostar 100 UNIT/ML SOPN Inject 15 Units as directed daily at bedtime    levothyroxine 150 mcg tablet TAKE 1 TABLET DAILY AT LEAST 30 MINUTES PRIOR TO BREAKFAST OR OTHER MEDICATIONS (REPLACES 137 MCG)    losartan (COZAAR) 25 mg tablet Take 1 tablet (25 mg total) by mouth daily    metoprolol succinate (TOPROL-XL) 100 mg 24 hr tablet Take 100 mg by mouth daily    Multiple Vitamins-Minerals (multivitamin with minerals) tablet Take 1 tablet by mouth daily    omeprazole (PriLOSEC) 20 mg delayed release capsule 20 mg daily    glipiZIDE (GLUCOTROL XL) 5 mg 24 hr tablet Take 5 mg by mouth daily    linaGLIPtin 5 MG TABS Take 5 mg by mouth daily (Patient not taking: Reported on 2024)    [DISCONTINUED] atenolol (TENORMIN) 50 mg tablet Take by mouth     No current facility-administered medications on file prior to  "visit.       Allergies   Allergen Reactions    Aspirin Other (See Comments)     Not an allergy, take off as precaution for the kidneys per patient.     Other Dermatitis    Felodipine Other (See Comments)     Dizzy      Lisinopril Other (See Comments)     hyperkalemia      Niacin Itching     flushing and itching      Nsaids Other (See Comments)     Low GFR    Simvastatin Other (See Comments)     pancreatitis         Physical Exam    /88 (BP Location: Left arm, Patient Position: Sitting, Cuff Size: Adult)   Pulse 88   Temp 98.2 °F (36.8 °C)   Resp 18   Ht 5' 10\" (1.778 m)   Wt 111 kg (244 lb)   SpO2 98%   BMI 35.01 kg/m²     Constitutional: normal, well developed, well nourished, alert, in no distress and non-toxic and no overt pain behavior. and obese  Eyes: anicteric  HEENT: grossly intact  Neck: supple, symmetric, trachea midline and no masses   Pulmonary:even and unlabored  Cardiovascular:No edema or pitting edema present  Skin:Normal without rashes or lesions and well hydrated  Psychiatric:Mood and affect appropriate  Neurologic:Cranial Nerves II-XII grossly intact Sensation grossly intact; no clonus negative omalley's. Reflexes 2+ and brisk. SLR negative bilaterally. Spurling's maneuver negative bilaterally.  Musculoskeletal:normal gait. 5/5 strength bilaterally with AROM in all extremities. Normal heel toe and tip toe walking. No pain with lumbar facet loading bilaterally and with lateral spine rotation. No ttp over lumbar paraspinal muscles. Negative kassandra's test, negative gaenslen's negative SIJ loading bilaterally.    Imaging    No pertinent imaging to review at this time    "

## 2024-05-17 NOTE — PATIENT INSTRUCTIONS
Gabapentin (By mouth)   Gabapentin (devon-a-PEN-tin)  Treats seizures and pain caused by shingles.   Brand Name(s): FusePaq Fanatrex, Neurontin   There may be other brand names for this medicine.  When This Medicine Should Not Be Used:   This medicine is not right for everyone. Do not use it if you had an allergic reaction to gabapentin.  How to Use This Medicine:   Capsule, Liquid, Tablet  Take your medicine as directed. Your dose may need to be changed several times to find what works best for you. If you have epilepsy, do not allow more than 12 hours to pass between doses.  Capsule: Swallow the capsule whole with plenty of water. Do not open, crush, or chew it.  Gralise® tablet: Swallow the tablet whole . Do not crush, break, or chew it.  Neurontin® tablet: If you break a tablet into 2 pieces, use the second half as your next dose. Do not use the half-tablet if the whole tablet has been cut or broken after 28 days.  Oral liquid: Measure the oral liquid medicine with a marked measuring spoon, oral syringe, or medicine cup.  This medicine should come with a Medication Guide. Ask your pharmacist for a copy if you do not have one.  Missed dose: Take a dose as soon as you remember. If it is almost time for your next dose, wait until then and take a regular dose. Do not take extra medicine to make up for a missed dose.  Store the medicine in a closed container at room temperature, away from heat, moisture, and direct light. Store the Neurontin® oral liquid in the refrigerator. Do not freeze.  Drugs and Foods to Avoid:   Ask your doctor or pharmacist before using any other medicine, including over-the-counter medicines, vitamins, and herbal products.  Some medicines can affect how gabapentin works. Tell your doctor if you also using hydrocodone or morphine.  If you take an antacid, wait at least 2 hours before you take gabapentin.  Do not drink alcohol while you are using this medicine.  Tell your doctor if you use  anything else that makes you sleepy. Some examples are allergy medicine, narcotic pain medicine, and alcohol. Tell your doctor if you are also using lorazepam, oxycodone, or zolpidem.  Warnings While Using This Medicine:   Tell your doctor if you are pregnant or breastfeeding, or if you have kidney problems (including patients receiving dialysis) or lung problems. Tell your doctor if you have a history of depression or mental health problems.  This medicine may cause the following problems:  Drug reaction with eosinophilia and systemic symptoms (DRESS) or multiorgan hypersensitivity, which may damage the liver, kidney, blood, heart, or muscles  Changes in mood or behavior, including suicidal thoughts or behavior  Respiratory depression (serious breathing problem that can be life-threatening), when used with narcotic pain medicines  Do not stop using this medicine suddenly. Your doctor will need to slowly decrease your dose before you stop it completely.  This medicine may make you dizzy or drowsy. Do not drive or do anything else that could be dangerous until you know how this medicine affects you.  Tell any doctor or dentist who treats you that you are using this medicine. This medicine may affect certain medical test results.  Your doctor will check your progress and the effects of this medicine at regular visits. Keep all appointments.  Keep all medicine out of the reach of children. Never share your medicine with anyone.  Possible Side Effects While Using This Medicine:   Call your doctor right away if you notice any of these side effects:  Allergic reaction: Itching or hives, swelling in your face or hands, swelling or tingling in your mouth or throat, chest tightness, trouble breathing  Behavior problems, aggression, restlessness, trouble concentrating, moodiness (especially in children)  Blistering, peeling, red skin rash  Blue lips, fingernails, or skin, chest pain, fast heartbeat, trouble breathing  Change  in how much or how often you urinate, bloody or cloudy urine  Dark urine or pale stools, nausea, vomiting, loss of appetite, stomach pain, yellow skin or eyes  Fever, chills, cough, sore throat, body aches  Problems with coordination, shakiness, unsteadiness, unusual eye movement  Rapid weight gain, swelling in your hands, ankles, or feet  Rash, swollen or tender glands in the neck, armpit, or groin  Unusual moods or behaviors, thoughts of hurting yourself, feeling depressed  If you notice these less serious side effects, talk with your doctor:   Dizziness, drowsiness, sleepiness, tiredness  If you notice other side effects that you think are caused by this medicine, tell your doctor.   Call your doctor for medical advice about side effects. You may report side effects to FDA at 9-403-FDA-3515  © Copyright [employer] 2023 Information is for End User's use only and may not be sold, redistributed or otherwise used for commercial purposes.  The above information is an  only. It is not intended as medical advice for individual conditions or treatments. Talk to your doctor, nurse or pharmacist before following any medical regimen to see if it is safe and effective for you.

## 2024-07-22 DIAGNOSIS — L97.512 DIABETIC ULCER OF OTHER PART OF RIGHT FOOT ASSOCIATED WITH DIABETES MELLITUS DUE TO UNDERLYING CONDITION, WITH FAT LAYER EXPOSED (HCC): Primary | ICD-10-CM

## 2024-07-22 DIAGNOSIS — E08.621 DIABETIC ULCER OF OTHER PART OF RIGHT FOOT ASSOCIATED WITH DIABETES MELLITUS DUE TO UNDERLYING CONDITION, WITH FAT LAYER EXPOSED (HCC): Primary | ICD-10-CM

## 2024-08-05 RX ORDER — BLOOD-GLUCOSE SENSOR
EACH MISCELLANEOUS
COMMUNITY
Start: 2024-07-15

## 2024-08-07 ENCOUNTER — OFFICE VISIT (OUTPATIENT)
Dept: PODIATRY | Age: 64
End: 2024-08-07
Payer: COMMERCIAL

## 2024-08-07 VITALS
BODY MASS INDEX: 33.87 KG/M2 | SYSTOLIC BLOOD PRESSURE: 118 MMHG | WEIGHT: 236.6 LBS | HEART RATE: 54 BPM | TEMPERATURE: 98.2 F | HEIGHT: 70 IN | DIASTOLIC BLOOD PRESSURE: 78 MMHG | OXYGEN SATURATION: 99 %

## 2024-08-07 DIAGNOSIS — B35.1 ONYCHOMYCOSIS: ICD-10-CM

## 2024-08-07 DIAGNOSIS — T14.8XXA SKIN ABRASION: ICD-10-CM

## 2024-08-07 DIAGNOSIS — E11.52 TYPE 2 DIABETES MELLITUS WITH DIABETIC PERIPHERAL ANGIOPATHY AND GANGRENE, WITHOUT LONG-TERM CURRENT USE OF INSULIN (HCC): Primary | ICD-10-CM

## 2024-08-07 DIAGNOSIS — M79.671 PAIN OF RIGHT HEEL: ICD-10-CM

## 2024-08-07 DIAGNOSIS — L84 CALLUS: ICD-10-CM

## 2024-08-07 PROCEDURE — 11721 DEBRIDE NAIL 6 OR MORE: CPT | Performed by: STUDENT IN AN ORGANIZED HEALTH CARE EDUCATION/TRAINING PROGRAM

## 2024-08-07 PROCEDURE — 99213 OFFICE O/P EST LOW 20 MIN: CPT | Performed by: STUDENT IN AN ORGANIZED HEALTH CARE EDUCATION/TRAINING PROGRAM

## 2024-08-07 PROCEDURE — 11056 PARNG/CUTG B9 HYPRKR LES 2-4: CPT | Performed by: STUDENT IN AN ORGANIZED HEALTH CARE EDUCATION/TRAINING PROGRAM

## 2024-08-07 NOTE — PROGRESS NOTES
Stalin Almonte  1960  AT RISK FOOT CARE    1. Type 2 diabetes mellitus with diabetic peripheral angiopathy and gangrene, without long-term current use of insulin (HCC)        2. Onychomycosis        3. Callus        4. Pain of right heel        5. Skin abrasion              Patient presents for at-risk foot care.  Patient notes new right plantar heel pain. Did not get new custom DM shoes or inserts yet as instructed. Does not use the donut pads nor the amlactin cream as instructed. Walks bare foot AMA. Had a superficial wound to the top of his right foot but this has resolved. Patient has significant lower extremity risk due to diminished pulses in the feet and trophic skin changes to the lower extremity including thick toenail, atrophic skin, and decreased hair growth. Prior right 2nd toe partial amputation and partial amputation 3rd ray right foot.    See's pain mnmgt for PN. Consult note from 4/12/24 reviewed.   Has custom DM shoes and inserts.     On exam patient has thickened, hypertrophic, discolored, brittle toenails with subungual debris and tenderness x8   Callus: 2 (R submet 2, L submet 1)  Amputation: 2 (right 2nd toe partial amputation and partial amputation 3rd ray right foot)    Scan right plantar heel pain at insertion of plantar fascia to calc.   Right plantar-lateral foot skin abrasion. No drainage. No SOI.     Today's treatment includes:  Debridement of toenails x8. Using nail nipper, ld, and curette, nails were sharply debrided, reduced in thickness and length. Devitalized nail tissue and fungal debris excised and removed. Patient tolerated well.    Paring of callus x2. Using a #15 blade the hyperkeratotic tissue below the 2nd metatarsal head right foot was pared in thickness to normal epithelium. C/w Dr. Rivera's Gels silicone metatarsal pad, achilles stretching, Amlactin.  Report for custom DM shoes and inserts  I discussed likely ddx for heel pain as plantar fasciitis. I recommended PT  however patient defers. I gave stretching handout today. I recommended reporting for custom DM shoes/inserts.   I recommended daily dermagran dsd to right plantar foot abrasion until this heals. Call if not healed in 1-3wks. Do not walk barefoot!!       Discussed proper shoe gear, daily inspections of feet, and general foot health with patient.   Patient has Q7 findings and is recommended for at risk foot care every 9-10 weeks.     Patients most recent complete clinical exam was performed 3/27/24

## 2024-08-07 NOTE — PATIENT INSTRUCTIONS
Achilles Tendon Stretching Exercises    A) Standing Gastrocnemius stretch  Place hands on wall or chair. If using wall, put your hands at eye level.  Step the leg you want to stretch behind you. Keep your back heel on the floor and point your toes straight ahead or slightly inward towards the heel of the opposite foot.   Bend your knee toward the wall while keeping your back leg straight.  Lean toward the wall until you feel a gentle stretch in you calf of the straight leg. Don't lean so far that you feel pain.  Hold for 15 seconds. Complete 3 reps.    B) Standing soleus stretch  Place your hands on the wall or chair. If using wall, put your hands at eye level.  Step the leg you want to stretch behind you (your back foot will need to be closer to the front foot than the above stretch). Keep your back heel on the floor and point your toes straight ahead or slightly inward towards the heel of the opposite foot.   Bend both your front and back knee at the same time (may help to stick your butt out). You do not need to lean towards the wall, just bend the knees.   Lean toward the wall until you feel a gentle stretch in you calf of the straight leg. Don't lean so far that you feel pain.  Hold for 15 seconds. Complete 3 reps.          Keep these tips and tricks in mind to get the most out of your stretching;  Take your time - move slowly, whether you are deepening into a stretch or changing positions. This will limit the risk of injury & discomfort.  Avoid bouncing - quick sudden movements will only worsen achilles tendon issues. Stay relaxed during stretch.  Keep your heel down and toes straight ahead or slightly inward - this will allow the achilles tendon to stretch properly.   Stop if you feel pain - Don't strain or force your muscle. If you feel sharp pain, stop immediately.

## 2024-10-16 ENCOUNTER — OFFICE VISIT (OUTPATIENT)
Dept: PODIATRY | Age: 64
End: 2024-10-16
Payer: COMMERCIAL

## 2024-10-16 ENCOUNTER — HOSPITAL ENCOUNTER (OUTPATIENT)
Dept: RADIOLOGY | Facility: CLINIC | Age: 64
Discharge: HOME | End: 2024-10-16
Payer: COMMERCIAL

## 2024-10-16 VITALS
OXYGEN SATURATION: 97 % | SYSTOLIC BLOOD PRESSURE: 128 MMHG | TEMPERATURE: 98.5 F | DIASTOLIC BLOOD PRESSURE: 80 MMHG | HEIGHT: 70 IN | HEART RATE: 59 BPM | WEIGHT: 236 LBS | BODY MASS INDEX: 33.79 KG/M2

## 2024-10-16 DIAGNOSIS — B35.1 ONYCHOMYCOSIS: ICD-10-CM

## 2024-10-16 DIAGNOSIS — E11.621 DIABETIC ULCER OF TOE OF RIGHT FOOT ASSOCIATED WITH TYPE 2 DIABETES MELLITUS, WITH FAT LAYER EXPOSED (HCC): ICD-10-CM

## 2024-10-16 DIAGNOSIS — L97.512 DIABETIC ULCER OF TOE OF RIGHT FOOT ASSOCIATED WITH TYPE 2 DIABETES MELLITUS, WITH FAT LAYER EXPOSED (HCC): ICD-10-CM

## 2024-10-16 DIAGNOSIS — E11.52 TYPE 2 DIABETES MELLITUS WITH DIABETIC PERIPHERAL ANGIOPATHY AND GANGRENE, WITHOUT LONG-TERM CURRENT USE OF INSULIN (HCC): Primary | ICD-10-CM

## 2024-10-16 DIAGNOSIS — E11.52 TYPE 2 DIABETES MELLITUS WITH DIABETIC PERIPHERAL ANGIOPATHY AND GANGRENE, WITHOUT LONG-TERM CURRENT USE OF INSULIN (HCC): ICD-10-CM

## 2024-10-16 DIAGNOSIS — L84 CALLUS: ICD-10-CM

## 2024-10-16 DIAGNOSIS — L03.031 CELLULITIS OF RIGHT TOE: ICD-10-CM

## 2024-10-16 PROCEDURE — 73660 X-RAY EXAM OF TOE(S): CPT

## 2024-10-16 PROCEDURE — 11721 DEBRIDE NAIL 6 OR MORE: CPT | Performed by: STUDENT IN AN ORGANIZED HEALTH CARE EDUCATION/TRAINING PROGRAM

## 2024-10-16 PROCEDURE — 99213 OFFICE O/P EST LOW 20 MIN: CPT | Performed by: STUDENT IN AN ORGANIZED HEALTH CARE EDUCATION/TRAINING PROGRAM

## 2024-10-16 PROCEDURE — 11056 PARNG/CUTG B9 HYPRKR LES 2-4: CPT | Performed by: STUDENT IN AN ORGANIZED HEALTH CARE EDUCATION/TRAINING PROGRAM

## 2024-10-16 RX ORDER — CEPHALEXIN 500 MG/1
500 CAPSULE ORAL EVERY 6 HOURS SCHEDULED
Qty: 20 CAPSULE | Refills: 0 | Status: SHIPPED | OUTPATIENT
Start: 2024-10-16 | End: 2024-10-21

## 2024-10-16 NOTE — PROGRESS NOTES
Stalin Almonte  1960  AT RISK FOOT CARE    1. Type 2 diabetes mellitus with diabetic peripheral angiopathy and gangrene, without long-term current use of insulin (HCC)  XR toe right fifth min 2 views      2. Onychomycosis        3. Callus        4. Diabetic ulcer of toe of right foot associated with type 2 diabetes mellitus, with fat layer exposed (HCC)  Post Op Shoe    Ambulatory Referral to Wound Care      5. Cellulitis of right toe  cephalexin (KEFLEX) 500 mg capsule          Patient presents for at-risk foot care.  Patient notes new right 5th toe ulcer present for a few days due to walking dogs alot. Notes some redness. Did not get new custom DM shoes or inserts yet as instructed. Does not use the donut pads nor the amlactin cream as instructed. Still walks bare foot AMA. Patient has significant lower extremity risk due to diminished pulses in the feet and trophic skin changes to the lower extremity including thick toenail, atrophic skin, and decreased hair growth. Prior right 2nd toe partial amputation and partial amputation 3rd ray right foot. Wearing old worn out DM shoes.     On exam patient has thickened, hypertrophic, discolored, brittle toenails with subungual debris and tenderness x8   Callus: 2 (R submet 2, L submet 1)  Amputation: 2 (right 2nd toe partial amputation and partial amputation 3rd ray right foot)    Right plantar-lateral 5th toe with DFU. No deep probe to bone. There is cellulitic erythema to toe. No malodor or purulence.     Today's treatment includes:  Debridement of toenails x8. Using nail nipper, ld, and curette, nails were sharply debrided, reduced in thickness and length. Devitalized nail tissue and fungal debris excised and removed. Patient tolerated well.    Paring of callus x2. Using a #15 blade the hyperkeratotic tissue below the 2nd metatarsal head right foot was pared in thickness to normal epithelium. C/w Dr. Rivera's Gels silicone metatarsal pad, achilles stretching,  Amlactin.  Report for custom DM shoes and inserts once R5 DFU has healed  XR right 5th toe 2v 10/16/24: no acute osseous abnormalities noted.   I recommended daily dermagran dsd to right 5th toe ulcer Q2d. Do not shower. WB surg shoe ADLs only. Wound care referral given, f/u 1-2wks.       Discussed proper shoe gear, daily inspections of feet, and general foot health with patient.   Patient has Q7 findings and is recommended for at risk foot care every 9-10 weeks.     Patients most recent complete clinical exam was performed 3/27/24

## 2024-10-23 ENCOUNTER — OFFICE VISIT (OUTPATIENT)
Facility: CLINIC | Age: 64
End: 2024-10-23
Payer: COMMERCIAL

## 2024-10-23 ENCOUNTER — APPOINTMENT (OUTPATIENT)
Dept: LAB | Facility: HOSPITAL | Age: 64
End: 2024-10-23
Payer: COMMERCIAL

## 2024-10-23 VITALS
DIASTOLIC BLOOD PRESSURE: 68 MMHG | HEIGHT: 70 IN | HEART RATE: 62 BPM | WEIGHT: 230 LBS | TEMPERATURE: 97.5 F | RESPIRATION RATE: 20 BRPM | SYSTOLIC BLOOD PRESSURE: 138 MMHG | BODY MASS INDEX: 32.93 KG/M2

## 2024-10-23 DIAGNOSIS — E11.59 TYPE 2 DIABETES MELLITUS WITH OTHER CIRCULATORY COMPLICATION, WITHOUT LONG-TERM CURRENT USE OF INSULIN (HCC): ICD-10-CM

## 2024-10-23 DIAGNOSIS — S91.104A OPEN WOUND OF FIFTH TOE OF RIGHT FOOT, INITIAL ENCOUNTER: ICD-10-CM

## 2024-10-23 DIAGNOSIS — E11.621 DIABETIC ULCER OF TOE OF RIGHT FOOT ASSOCIATED WITH TYPE 2 DIABETES MELLITUS, WITH FAT LAYER EXPOSED (HCC): Primary | ICD-10-CM

## 2024-10-23 DIAGNOSIS — L97.512 DIABETIC ULCER OF TOE OF RIGHT FOOT ASSOCIATED WITH TYPE 2 DIABETES MELLITUS, WITH FAT LAYER EXPOSED (HCC): Primary | ICD-10-CM

## 2024-10-23 LAB
BASOPHILS # BLD AUTO: 0.03 THOUSANDS/ΜL (ref 0–0.1)
BASOPHILS NFR BLD AUTO: 0 % (ref 0–1)
CRP SERPL QL: 2.9 MG/L
EOSINOPHIL # BLD AUTO: 0.25 THOUSAND/ΜL (ref 0–0.61)
EOSINOPHIL NFR BLD AUTO: 3 % (ref 0–6)
ERYTHROCYTE [DISTWIDTH] IN BLOOD BY AUTOMATED COUNT: 12.3 % (ref 11.6–15.1)
ERYTHROCYTE [SEDIMENTATION RATE] IN BLOOD: 18 MM/HOUR (ref 0–19)
HCT VFR BLD AUTO: 35.2 % (ref 36.5–49.3)
HGB BLD-MCNC: 11.4 G/DL (ref 12–17)
IMM GRANULOCYTES # BLD AUTO: 0.02 THOUSAND/UL (ref 0–0.2)
IMM GRANULOCYTES NFR BLD AUTO: 0 % (ref 0–2)
LYMPHOCYTES # BLD AUTO: 3.16 THOUSANDS/ΜL (ref 0.6–4.47)
LYMPHOCYTES NFR BLD AUTO: 43 % (ref 14–44)
MCH RBC QN AUTO: 34.9 PG (ref 26.8–34.3)
MCHC RBC AUTO-ENTMCNC: 32.4 G/DL (ref 31.4–37.4)
MCV RBC AUTO: 108 FL (ref 82–98)
MONOCYTES # BLD AUTO: 0.77 THOUSAND/ΜL (ref 0.17–1.22)
MONOCYTES NFR BLD AUTO: 10 % (ref 4–12)
NEUTROPHILS # BLD AUTO: 3.2 THOUSANDS/ΜL (ref 1.85–7.62)
NEUTS SEG NFR BLD AUTO: 44 % (ref 43–75)
NRBC BLD AUTO-RTO: 0 /100 WBCS
PLATELET # BLD AUTO: 179 THOUSANDS/UL (ref 149–390)
PMV BLD AUTO: 10 FL (ref 8.9–12.7)
RBC # BLD AUTO: 3.27 MILLION/UL (ref 3.88–5.62)
WBC # BLD AUTO: 7.43 THOUSAND/UL (ref 4.31–10.16)

## 2024-10-23 PROCEDURE — 85025 COMPLETE CBC W/AUTO DIFF WBC: CPT

## 2024-10-23 PROCEDURE — 36415 COLL VENOUS BLD VENIPUNCTURE: CPT

## 2024-10-23 PROCEDURE — 85652 RBC SED RATE AUTOMATED: CPT

## 2024-10-23 PROCEDURE — 97597 DBRDMT OPN WND 1ST 20 CM/<: CPT | Performed by: FAMILY MEDICINE

## 2024-10-23 PROCEDURE — 99204 OFFICE O/P NEW MOD 45 MIN: CPT | Performed by: FAMILY MEDICINE

## 2024-10-23 PROCEDURE — G0463 HOSPITAL OUTPT CLINIC VISIT: HCPCS | Performed by: FAMILY MEDICINE

## 2024-10-23 PROCEDURE — 99213 OFFICE O/P EST LOW 20 MIN: CPT | Performed by: FAMILY MEDICINE

## 2024-10-23 PROCEDURE — 86140 C-REACTIVE PROTEIN: CPT

## 2024-10-23 RX ORDER — LIDOCAINE 40 MG/G
CREAM TOPICAL ONCE
Status: COMPLETED | OUTPATIENT
Start: 2024-10-23 | End: 2024-10-23

## 2024-10-23 RX ADMIN — LIDOCAINE: 40 CREAM TOPICAL at 08:42

## 2024-10-23 NOTE — PROGRESS NOTES
Patient ID: Stalin Almonte is a 64 y.o. male Date of Birth 1960       Chief Complaint   Patient presents with    New Patient Visit     R foot Toe wound, noticed apx 2 weeks ago        Allergies:  Aspirin, Other, Felodipine, Lisinopril, Niacin, Nsaids, and Simvastatin    Diagnosis:      Diagnosis ICD-10-CM Associated Orders   1. Diabetic ulcer of toe of right foot associated with type 2 diabetes mellitus, with fat layer exposed ([facility])  E11.621 lidocaine (LMX) 4 % cream    L97.512 Wound cleansing and dressings Right Toe D5, fifth     Wound cleansing and dressings Right;Posterior Toe D5, fifth     Sedimentation rate, automated     C-reactive protein     CBC and differential     Wound Procedure Treatment Right;Posterior Toe D5, fifth     Wound Procedure Treatment Right Toe D5, fifth      2. Type 2 diabetes mellitus with other circulatory complication, without long-term current use of insulin ([facility])  E11.59               Assessment & Plan:  DFU R 5th distal to plantar surface: Pink wound bed with biofilm slough layer and significant periwound callusing.  Slight edema of digit without induration, fluctuance, crepitus.  Mild erythema of digit present without increased warmth or lymphangitic streaking.  Selective debridement  Recommend he obtain wound cleanser Vashe  Continue Dermagran  X-ray completed 10/16/2024 reviewed: No acute osseous abnormalities noted  Additional labs ordered today for monitoring CBC/CRP/sed rate.  He recently finished course of antibiotics.  He had been wearing surgical shoe provided at his last podiatry appointment however he felt that this was rubbing on his toe.  On closer inspection, appeared that this area was in fact being irritated by the surgical shoe.  Provided patient with alternate surgical shoe for offloading during his visit today and ensured that this did not come into contact with his wounds.  Emphasized extreme importance of adequate offloading and risks of failure to do so  "including but not limited to loss of digit, infection, loss of limb.  He expresses understanding.  Per patient and per photo provided by patient taken on day of recent podiatry visit 10/16/2024, erythema relatively unchanged.  No increased warmth or other cellulitic findings on exam today.  Considering he just completed course of antibiotics, will hold for now pending additional labs ordered at visit today.  NEW small ulcer noted at the crease between digit and foot beneath fifth toe: Very small, located in the crease; relatively superficial, pink wound bed.  Apply regular calcium alginate  Offload  Bedside ABIs updated at wound center today R 1.07 / L 1.01  Type 2 diabetes:  A1C results reviewed with the patient today. 9.2 June 2024.  Uncontrolled.  Reviewed importance of diabetic control and risks associated with uncontrolled diabetes.  He expresses understanding.   Reviewed extreme importance of very close monitoring of wounds. Discussed that diabetes places patients at increased risk for wound complications despite adequate cleansing and care.  Should patient experience any acute change or any of the following symptoms including but not limited to fever, chills, increased pain at the wound, swelling, purulent drainage, foul odor, etc... to immediately proceed to emergency department. Pt expresses understanding.   See below for full wound care orders/details  Follow-up with Dr. Messer in 1 week or sooner if needed       X-ray right fifth toe 10/16/2024:  \"There is no acute fracture or dislocation.  Stable resection distal third metatarsal and phalanges with stable resection second distal phalanx.  No periosteal reaction or cortical destruction to suggest osteomyelitis.  No soft tissue gas or radiopaque foreign body. Soft tissue swelling about the fifth digit.  IMPRESSION:  No radiographic evidence of osteomyelitis.\"      Portions of the record may have been created with voice recognition software. Occasional " "wrong word or \"sound alike\" substitutions may have occurred due to the inherent limitations of voice recognition software. Read the chart carefully and recognize, using context, where substitutions have occurred.    Subjective:   10/23/2024: Mr. Almonte is a 64-year-old male with a past medical history including but not limited to stage IV CKD, hypertension, type 2 diabetes, osteomyelitis right foot, hx right foot partial amp third ray and right second toe partial amp, who presents to wound center today for evaluation of right fifth toe wound.  His wife has accompanied him to today's visit. Patient was seen at podiatry on 10/16/2024 and was noted to have a new wound right fifth toe.  He reports he had not been utilizing the diabetic shoes or inserts along with donut pads as instructed by podiatry in the past. Per last podiatry visit patient was instructed to utilize Dermagran and offloading surgical shoe.  Both patient and wife report that he really has not been offloading and is frequently walking/ambulating.  He has not had fever or chills. + Neuropathy.  Denies other new acute symptoms.      The following portions of the patient's history were reviewed and updated as appropriate:   Patient Active Problem List   Diagnosis    Osteomyelitis of foot, right, acute (HCC)    Acquired hypothyroidism    Stage 4 chronic kidney disease (HCC)    Essential hypertension    Chronic anemia    Type 2 diabetes mellitus with circulatory disorder, without long-term current use of insulin (HCC)    GERD (gastroesophageal reflux disease)    Severe obesity with body mass index (BMI) of 35.0 to 39.9 with serious comorbidity (HCC)    Cellulitis of right toe    Hyperkalemia    Diabetic nephropathy associated with type 2 diabetes mellitus (HCC)    Benign hypertension with CKD (chronic kidney disease) stage III (HCC)    Persistent proteinuria    Callus    Type 2 diabetes mellitus with diabetic peripheral angiopathy and gangrene, without " long-term current use of insulin (HCC)    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)    Diabetic peripheral neuropathy (HCC)    Polyneuropathy associated with underlying disease (HCC)    Diabetic ulcer of toe of right foot associated with type 2 diabetes mellitus, with fat layer exposed (HCC)     Past Medical History:   Diagnosis Date    Chronic kidney disease     Chronic kidney failure, stage 4     COVID-19     2019    Diabetes mellitus (HCC)     GERD (gastroesophageal reflux disease)     Gout     Hypertension      Past Surgical History:   Procedure Laterality Date    ANKLE FRACTURE SURGERY Right     BONE BIOPSY Right 2022    Procedure: Right 3rd metatarsal head bone biopsy;  Surgeon: Traci Messer DPM;  Location: [facility];  Service: Podiatry    CHOLECYSTECTOMY      MA AMPUTATION METATARSAL W/TOE SINGLE Right 2022    Procedure: Partial 3rd RAY excision FOOT, plantar wound excision;  Surgeon: Traci Messer DPM;  Location: [facility];  Service: Podiatry    TOE AMPUTATION Right 2022    Procedure: RIGHT 2ND TOE PARTIAL AMPUTATION and right foot wound debridement;  Surgeon: Traci Messer DPM;  Location: [facility];  Service: Podiatry     Family History   Problem Relation Age of Onset    Gout Mother     Diabetes Father     Heart disease Father       Social History     Socioeconomic History    Marital status: Single     Spouse name: Not on file    Number of children: Not on file    Years of education: Not on file    Highest education level: Not on file   Occupational History    Not on file   Tobacco Use    Smoking status: Former     Current packs/day: 0.00     Average packs/day: 1 pack/day for 15.3 years (15.3 ttl pk-yrs)     Types: Cigarettes     Start date: 3/1/1975     Quit date: 1990     Years since quittin.4    Smokeless tobacco: Never   Vaping Use    Vaping status: Never Used   Substance and Sexual Activity    Alcohol use: Not Currently    Drug use:  Never    Sexual activity: Yes     Partners: Female     Birth control/protection: None   Other Topics Concern    Not on file   Social History Narrative    Not on file     Social Determinants of Health     Financial Resource Strain: Low Risk  (3/28/2024)    Received from [employer]    Financial Resource Strain     Do you have any trouble paying for your medications, or do you think you might in the future?: No     Does your family have trouble paying for medicine? (Household - for ages 0-17 years): Not on file   Food Insecurity: No Food Insecurity (3/28/2024)    Received from [employer]    Food Insecurity     Do you need food for this week?: No     Are you able to get enough food for your family? (Household - for ages 0-17 years): Not on file     Does your family need food this week? (Household - for ages 0-17 years): Not on file     Do you always have enough food for your family? (Household - for ages 0-17 years): Not on file   Transportation Needs: No Transportation Needs (3/28/2024)    Received from [facility]    Transportation Needs     READ ONLY Do you have trouble getting a ride to medical visits or work?: Never True     Does your family have a hard time getting a ride to doctors’ visits? (Household - for ages 0-17 years): Not on file     Has lack of transportation kept you from medical appointments, meetings, work, or from getting things needed for daily living? Check all that apply. (Adult - for ages 18 years and over): Not on file     Do you (or your family) have trouble finding or paying for a ride (transportation)? (Household - for ages 0-17 years): Not on file   Physical Activity: Not on file   Stress: Not on file   Social Connections: Socially Integrated (3/28/2024)    Received from [facility]    Social Connections     How often do you feel lonely or isolated from those around you?: Never   Intimate Partner Violence: Not on file   Housing Stability: Low Risk  (3/28/2024)    Received from [facility]    Housing  Stability     Do you currently live in a shelter or have no steady place to sleep at night?: No     READ ONLY Do you think you are at risk of becoming homeless?: No     Does your family worry about paying for your home or becoming homeless? (Household - for ages 0-17 years): Not on file     Are you homeless or worried that you might be in the future? (Adult - for ages 18 years and over): Not on file     Are you (or your family) homeless or worried that you might be in the future? (Household - for ages 0-17 years): Not on file        Current Outpatient Medications:     Insulin Glargine Solostar 100 UNIT/ML SOPN, Inject 35 Units as directed daily at bedtime, Disp: , Rfl:     allopurinol (ZYLOPRIM) 100 mg tablet, Take 100 mg by mouth daily, Disp: , Rfl:     ammonium lactate (LAC-HYDRIN) 12 % cream, Apply topically 2 (two) times a day To dry skin of feet and calluses, Disp: 385 g, Rfl: 3    aspirin (ECOTRIN LOW STRENGTH) 81 mg EC tablet, Take 81 mg by mouth daily, Disp: , Rfl:     atorvastatin (LIPITOR) 20 mg tablet, Take 20 mg by mouth see administration instructions 3x a week, Monday, Wednesday, and Friday, Disp: , Rfl:     bisacodyl (DULCOLAX) 5 mg EC tablet, Take 1 tablet (5 mg total) by mouth daily as needed for constipation, Disp: 30 tablet, Rfl: 0    co-enzyme Q-10 100 mg capsule, Take 100 mg by mouth daily, Disp: , Rfl:     Continuous Glucose Sensor (FreeStyle Edgardo 3 Sensor) MISC, USE AS DIRECTED. USE TO MONITOR BLOOD GLUCOSE E11.9, Disp: , Rfl:     Empagliflozin (JARDIANCE) 10 MG TABS tablet, Take 10 mg by mouth daily, Disp: , Rfl:     gabapentin (NEURONTIN) 300 mg capsule, Take 1 capsule (300 mg total) by mouth 3 (three) times a day, Disp: 270 capsule, Rfl: 3    levothyroxine 150 mcg tablet, TAKE 1 TABLET DAILY AT LEAST 30 MINUTES PRIOR TO BREAKFAST OR OTHER MEDICATIONS (REPLACES 137 MCG), Disp: , Rfl:     linaGLIPtin 5 MG TABS, Take 5 mg by mouth daily (Patient not taking: Reported on 5/17/2024), Disp: ,  "Rfl:     losartan (COZAAR) 25 mg tablet, Take 1 tablet (25 mg total) by mouth daily, Disp: 90 tablet, Rfl: 2    metoprolol succinate (TOPROL-XL) 100 mg 24 hr tablet, Take 100 mg by mouth daily, Disp: , Rfl:     Multiple Vitamins-Minerals (multivitamin with minerals) tablet, Take 1 tablet by mouth daily, Disp: , Rfl:     omeprazole (PriLOSEC) 20 mg delayed release capsule, 20 mg daily, Disp: , Rfl:     silver sulfadiazine (Silvadene) 1 % cream, Apply topically daily (Patient not taking: Reported on 8/7/2024), Disp: 25 g, Rfl: 0  No current facility-administered medications for this visit.    Review of Systems   Constitutional:  Negative for activity change, chills and fever.   Cardiovascular:  Negative for chest pain and leg swelling.   Musculoskeletal:         S/p right 2nd toe partial amputation   Skin:  Positive for wound (R 5th toe).   Neurological:  Negative for numbness.   Psychiatric/Behavioral:  The patient is not nervous/anxious.        Objective:  /68   Pulse 62   Temp 97.5 °F (36.4 °C)   Resp 20   Ht 5' 10\" (1.778 m)   Wt 104 kg (230 lb)   BMI 33.00 kg/m²         Physical Exam  Vitals reviewed.   Constitutional:       General: He is not in acute distress.     Appearance: He is not ill-appearing, toxic-appearing or diaphoretic.      Comments: Pleasant, no acute distress.  Wife at bedside.   Cardiovascular:      Rate and Rhythm: Normal rate.      Comments: Diminished but palpable DP/PT pulse right lower extremity  Pulmonary:      Effort: Pulmonary effort is normal. No respiratory distress.   Musculoskeletal:         General: Deformity (R foot partial amp second toe; R foot partial third ray amp) present.        Feet:    Feet:      Comments: 1- Pink wound bed with biofilm slough layer and significant periwound callusing.  Slight edema of digit without induration, fluctuance, crepitus.  Mild erythema of digit present without increased warmth or lymphangitic streaking.    2- NEW small ulcer noted at " "the crease between digit and foot beneath fifth toe: Very small, located in the crease; relatively superficial, pink wound bed.  Skin:     General: Skin is warm.      Findings: Wound (R 5th toe) present.   Neurological:      Mental Status: He is alert and oriented to person, place, and time.   Psychiatric:         Mood and Affect: Mood normal.         Behavior: Behavior normal.           Wound 10/23/24 Toe D5, fifth Right (Active)   Wound Image    10/23/24 0817   Wound Description Epithelialization;Pink;Brown 10/23/24 0819   Lara-wound Assessment Callus 10/23/24 0819   Wound Length (cm) 0.6 cm 10/23/24 0819   Wound Width (cm) 0.2 cm 10/23/24 0819   Wound Depth (cm) 0.1 cm 10/23/24 0819   Wound Surface Area (cm^2) 0.12 cm^2 10/23/24 0819   Wound Volume (cm^3) 0.012 cm^3 10/23/24 0819   Calculated Wound Volume (cm^3) 0.01 cm^3 10/23/24 0819   Drainage Amount Moderate 10/23/24 0819   Drainage Description Serosanguineous 10/23/24 0819   Non-staged Wound Description Full thickness 10/23/24 0819       Wound 10/23/24 Toe D5, fifth Right;Posterior (Active)   Wound Image   10/23/24 0905   Wound Description [place] 10/23/24 0908   Lara-wound Assessment Pink 10/23/24 0908   Wound Length (cm) 0.1 cm 10/23/24 0908   Wound Width (cm) 0.5 cm 10/23/24 0908   Wound Depth (cm) 0.1 cm 10/23/24 0908   Wound Surface Area (cm^2) 0.05 cm^2 10/23/24 0908   Wound Volume (cm^3) 0.005 cm^3 10/23/24 0908   Calculated Wound Volume (cm^3) 0.01 cm^3 10/23/24 0908   Drainage Amount Small 10/23/24 0908   Drainage Description Serosanguineous 10/23/24 0908   Non-staged Wound Description Partial thickness 10/23/24 0908     Debridement   Wound 10/23/24 Toe D5, fifth Right    Universal Protocol:  procedure performed by consultantConsent: Verbal consent obtained. Written consent obtained.  Consent given by: patient  Time out: Immediately prior to procedure a \"time out\" was called to verify the correct patient, procedure, equipment, support staff and " site/side marked as required.  Patient understanding: patient states understanding of the procedure being performed  Patient identity confirmed: verbally with patient    Debridement Details  Performed by: physician  Debridement type: selective  Pain control: lidocaine 4%      Post-debridement measurements  Length (cm): 4.5  Width (cm): 1.5  Depth (cm): 0.1  Percent debrided: 100%  Surface Area (cm^2): 6.75  Area Debrided (cm^2): 6.75  Volume (cm^3): 0.68    Devitalized tissue debrided: callus and slough  Instrument(s) utilized: blade and forceps  Bleeding: small  Hemostasis obtained with: pressure  Procedural pain (0-10): 0  Post-procedural pain: 0   Response to treatment: procedure was tolerated well             Lab Results   Component Value Date    HGBA1C 9.2 (H) 06/28/2024       Wound Instructions:  Orders Placed This Encounter   Procedures    Wound cleansing and dressings Right Toe D5, fifth     Wash your hands with soap and water.  Remove old dressing, discard into plastic bag and place in trash.   Cleanse the wound with mld, unscented soap (Dove)  prior to applying a clean dressing. Do not use tissue or cotton balls. Do not scrub the wound. Pat dry using gauze.    Shower no     Clense the wound with Vashe  Apply dermagran to the R toe wound.    Cover with gauze  Secure with clara and tape  Change dressing every other day    Off-loading Instructions:    Keep weight and pressure off wound at all times.  Wear off-loading device as directed by your physician.   Put on immediately when rising in the morning and remove when going to bed.      Watch for signs of infection (redness,warmth to the touch, increased pain, odor, pus, swelling, fever, chills, nausea, vomiting). If you notice any of these call the wound center or proceed to the ER.    Obtain labwork as ordered.     Standing Status:   Future     Standing Expiration Date:   10/30/2024    Wound cleansing and dressings Right;Posterior Toe D5, fifth     Apply  Exufiber (geling fiber) to the right posterior wound.    Cover with gauze  Secure with clara and tape  Change dressing every other day     Standing Status:   Future     Standing Expiration Date:   10/30/2024    Wound Procedure Treatment Right;Posterior Toe D5, fifth     This order was created via procedure documentation    Wound Procedure Treatment Right Toe D5, fifth     This order was created via procedure documentation    Debridement Right Toe D5, fifth     This order was created via procedure documentation    Sedimentation rate, automated     Standing Status:   Future     Number of Occurrences:   1     Standing Expiration Date:   10/23/2025    C-reactive protein     Standing Status:   Future     Number of Occurrences:   1     Standing Expiration Date:   10/23/2025    CBC and differential     This is a patient instruction: This test is non-fasting. Please drink two glasses of water morning of bloodwork.        Standing Status:   Future     Number of Occurrences:   1     Standing Expiration Date:   10/23/2025       Lisha Dumont MD

## 2024-10-23 NOTE — PATIENT INSTRUCTIONS
Orders Placed This Encounter   Procedures    Wound cleansing and dressings Right Toe D5, fifth     Wash your hands with soap and water.  Remove old dressing, discard into plastic bag and place in trash.   Cleanse the wound with mld, unscented soap (Dove)  prior to applying a clean dressing. Do not use tissue or cotton balls. Do not scrub the wound. Pat dry using gauze.    Shower no     Clense the wound with Vashe  Apply dermagran to the R toe wound.    Cover with gauze  Secure with clara and tape  Change dressing every other day    Off-loading Instructions:    Keep weight and pressure off wound at all times.  Wear off-loading device as directed by your physician.   Put on immediately when rising in the morning and remove when going to bed.      Watch for signs of infection (redness,warmth to the touch, increased pain, odor, pus, swelling, fever, chills, nausea, vomiting). If you notice any of these call the wound center or proceed to the ER.    Obtain labwork as ordered.     Standing Status:   Future     Standing Expiration Date:   10/30/2024    Wound cleansing and dressings Right;Posterior Toe D5, fifth     Apply Exufiber (geling fiber) to the right posterior wound.    Cover with gauze  Secure with clara and tape  Change dressing every other day     Standing Status:   Future     Standing Expiration Date:   10/30/2024    Sedimentation rate, automated     Standing Status:   Future     Standing Expiration Date:   10/23/2025    C-reactive protein     Standing Status:   Future     Standing Expiration Date:   10/23/2025    CBC and differential     This is a patient instruction: This test is non-fasting. Please drink two glasses of water morning of bloodwork.        Standing Status:   Future     Standing Expiration Date:   10/23/2025

## 2024-10-23 NOTE — PROGRESS NOTES
Wound Procedure Treatment Right;Posterior Toe D5, fifth    Performed by: Malcolm Rojas RN  Authorized by: Lisha Dumont MD    Associated wounds:   Wound 10/23/24 Toe D5, fifth Right;Posterior  Applied primary dressing:  Gelling fiber  Applied secondary dressing:  Gauze  Dressing secured with:  Tanesha and Tape  Offloading device appllied:  Surgical shoe  Wound Procedure Treatment Right Toe D5, fifth    Performed by: Malcolm Rojas RN  Authorized by: Lisha Dumont MD    Associated wounds:   Wound 10/23/24 Toe D5, fifth Right  Applied primary dressing:  Dermagran  Applied secondary dressing:  Gauze  Dressing secured with:  Tanesha and Tape  Offloading device appllied:  Surgical shoe

## 2024-10-31 ENCOUNTER — OFFICE VISIT (OUTPATIENT)
Facility: CLINIC | Age: 64
End: 2024-10-31
Payer: COMMERCIAL

## 2024-10-31 VITALS
DIASTOLIC BLOOD PRESSURE: 55 MMHG | RESPIRATION RATE: 18 BRPM | HEART RATE: 58 BPM | TEMPERATURE: 98.6 F | SYSTOLIC BLOOD PRESSURE: 112 MMHG

## 2024-10-31 DIAGNOSIS — L97.512 DIABETIC ULCER OF TOE OF RIGHT FOOT ASSOCIATED WITH TYPE 2 DIABETES MELLITUS, WITH FAT LAYER EXPOSED (HCC): Primary | ICD-10-CM

## 2024-10-31 DIAGNOSIS — E11.621 DIABETIC ULCER OF TOE OF RIGHT FOOT ASSOCIATED WITH TYPE 2 DIABETES MELLITUS, WITH FAT LAYER EXPOSED (HCC): Primary | ICD-10-CM

## 2024-10-31 PROCEDURE — 97597 DBRDMT OPN WND 1ST 20 CM/<: CPT | Performed by: STUDENT IN AN ORGANIZED HEALTH CARE EDUCATION/TRAINING PROGRAM

## 2024-10-31 RX ORDER — LIDOCAINE 40 MG/G
CREAM TOPICAL ONCE
Status: COMPLETED | OUTPATIENT
Start: 2024-10-31 | End: 2024-10-31

## 2024-10-31 RX ADMIN — LIDOCAINE: 40 CREAM TOPICAL at 13:56

## 2024-10-31 NOTE — PROGRESS NOTES
Wound Procedure Treatment Right Toe D5, fifth    Performed by: Kalani Hussein RN  Authorized by: Traci Messer DPM    Associated wounds:   Wound 10/23/24 Toe D5, fifth Right  Wound cleansed with:  NSS  Applied primary dressing:  Dermagran  Applied secondary dressing:  Gauze  Dressing secured with:  Tanesha and Tape  Offloading device appllied:  Surgical shoe

## 2024-10-31 NOTE — PATIENT INSTRUCTIONS
Orders Placed This Encounter   Procedures    Wound Procedure Treatment Right Toe D5, fifth     This order was created via procedure documentation    Debridement     This order was created via procedure documentation    Wound cleansing and dressings Diabetic Ulcer Right Toe D5, fifth     Wash your hands with soap and water.  Remove old dressing, discard into plastic bag and place in trash.   Cleanse the wound with mld, unscented soap (Dove)  prior to applying a clean dressing. Do not use tissue or cotton balls. Do not scrub the wound. Pat dry using gauze.     Shower no      Cleanse the wound with Vashe  Apply dermagran to the R toe wound.    Cover with gauze  Secure with clara and tape  Change dressing every other day     Off-loading Instructions:     Keep weight and pressure off wound at all times.  Wear off-loading device as directed by your physician.   Put on immediately when rising in the morning and remove when going to bed.        Watch for signs of infection (redness,warmth to the touch, increased pain, odor, pus, swelling, fever, chills, nausea, vomiting). If you notice any of these call the wound center or proceed to the ER.     Follow up in 2 weeks with Dr. Messer    Call [facility] to schedule an appt for your custom diabetic shoes, it may take awhile to get an appt.   [place]  [phone]    Purchase donut pads and wear on your 1st and 2nd toe to prevent rubbing.     Standing Status:   Future     Standing Expiration Date:   11/14/2024

## 2024-10-31 NOTE — PROGRESS NOTES
Patient ID: Stalin Almonte is a 64 y.o. male Date of Birth 1960       Chief Complaint   Patient presents with    Follow Up Wound Care Visit       Allergies:  Aspirin, Other, Felodipine, Lisinopril, Niacin, Nsaids, and Simvastatin    Diagnosis:  1. Diabetic ulcer of toe of right foot associated with type 2 diabetes mellitus, with fat layer exposed (HCC)  -     lidocaine (LMX) 4 % cream  -     Wound Procedure Treatment Right Toe D5, fifth  -     Wound cleansing and dressings Diabetic Ulcer Right Toe D5, fifth; Future; Expected date: 10/31/2024     Diagnosis ICD-10-CM Associated Orders   1. Diabetic ulcer of toe of right foot associated with type 2 diabetes mellitus, with fat layer exposed (HCC)  E11.621 lidocaine (LMX) 4 % cream    L97.512 Wound Procedure Treatment Right Toe D5, fifth     Wound cleansing and dressings Diabetic Ulcer Right Toe D5, fifth           Assessment & Plan:  See wound orders. (Dermagran dsd)  - Right 5th toe ulcer is superficial without SOI.  - c/w offloading with surgical shoe   - Other ulcer to foot healed.   - call  clinic for custom DM shoes and inserts (w/ appropriate cutouts/mod)  - A1c 9.2% 6/28/24. Much better BS control recommended.   - ESR/CRP/CBC WNL 10/23/24.  - F/u 2wks for recheck     Subjective:   Ed presents to clinic today concerning right 5th toe DFU. Doing well in surgical shoe and doing wound care as instructed. Trying to keep blood sugar under control.         The following portions of the patient's history were reviewed and updated as appropriate:   Patient Active Problem List   Diagnosis    Osteomyelitis of foot, right, acute (HCC)    Acquired hypothyroidism    Stage 4 chronic kidney disease (HCC)    Essential hypertension    Chronic anemia    Type 2 diabetes mellitus with circulatory disorder, without long-term current use of insulin (HCC)    GERD (gastroesophageal reflux disease)    Severe obesity with body mass index (BMI) of 35.0 to 39.9 with serious  comorbidity (HCC)    Cellulitis of right toe    Hyperkalemia    Diabetic nephropathy associated with type 2 diabetes mellitus (HCC)    Benign hypertension with CKD (chronic kidney disease) stage III (HCC)    Persistent proteinuria    Callus    Type 2 diabetes mellitus with diabetic peripheral angiopathy and gangrene, without long-term current use of insulin (HCC)    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)    Diabetic peripheral neuropathy (HCC)    Polyneuropathy associated with underlying disease (HCC)    Diabetic ulcer of toe of right foot associated with type 2 diabetes mellitus, with fat layer exposed (HCC)     Past Medical History:   Diagnosis Date    Chronic kidney disease 2010    Chronic kidney failure, stage 4     COVID-19     12/2019    Diabetes mellitus (HCC)     GERD (gastroesophageal reflux disease)     Gout     Hypertension      Past Surgical History:   Procedure Laterality Date    ANKLE FRACTURE SURGERY Right     BONE BIOPSY Right 11/30/2022    Procedure: Right 3rd metatarsal head bone biopsy;  Surgeon: Traci Messer DPM;  Location: [facility];  Service: Podiatry    CHOLECYSTECTOMY      WV AMPUTATION METATARSAL W/TOE SINGLE Right 12/16/2022    Procedure: Partial 3rd RAY excision FOOT, plantar wound excision;  Surgeon: Traci Messer DPM;  Location: [facility];  Service: Podiatry    TOE AMPUTATION Right 11/2/2022    Procedure: RIGHT 2ND TOE PARTIAL AMPUTATION and right foot wound debridement;  Surgeon: Traci Messer DPM;  Location: [facility];  Service: Podiatry     Social History     Socioeconomic History    Marital status: Single     Spouse name: None    Number of children: None    Years of education: None    Highest education level: None   Occupational History    None   Tobacco Use    Smoking status: Former     Current packs/day: 0.00     Average packs/day: 1 pack/day for 15.3 years (15.3 ttl pk-yrs)     Types: Cigarettes     Start date: 3/1/1975     Quit date: 6/1/1990      Years since quittin.4    Smokeless tobacco: Never   Vaping Use    Vaping status: Never Used   Substance and Sexual Activity    Alcohol use: Not Currently    Drug use: Never    Sexual activity: Yes     Partners: Female     Birth control/protection: None   Other Topics Concern    None   Social History Narrative    None     Social Determinants of Health     Financial Resource Strain: Low Risk  (3/28/2024)    Received from [facility]    Financial Resource Strain     Do you have any trouble paying for your medications, or do you think you might in the future?: No     Does your family have trouble paying for medicine? (Household - for ages 0-17 years): Not on file   Food Insecurity: No Food Insecurity (3/28/2024)    Received from [facility]    Food Insecurity     Do you need food for this week?: No     Are you able to get enough food for your family? (Household - for ages 0-17 years): Not on file     Does your family need food this week? (Household - for ages 0-17 years): Not on file     Do you always have enough food for your family? (Household - for ages 0-17 years): Not on file   Transportation Needs: No Transportation Needs (3/28/2024)    Received from [facility]    Transportation Needs     READ ONLY Do you have trouble getting a ride to medical visits or work?: Never True     Does your family have a hard time getting a ride to doctors’ visits? (Household - for ages 0-17 years): Not on file     Has lack of transportation kept you from medical appointments, meetings, work, or from getting things needed for daily living? Check all that apply. (Adult - for ages 18 years and over): Not on file     Do you (or your family) have trouble finding or paying for a ride (transportation)? (Household - for ages 0-17 years): Not on file   Physical Activity: Not on file   Stress: Not on file   Social Connections: Socially Integrated (3/28/2024)    Received from [facility]    Social Connections     How often do you feel lonely or  isolated from those around you?: Never   Intimate Partner Violence: Not on file   Housing Stability: Low Risk  (3/28/2024)    Received from [facility]     Do you currently live in a shelter or have no steady place to sleep at night?: No     READ ONLY Do you think you are at risk of becoming homeless?: No     Does your family worry about paying for your home or becoming homeless? (Household - for ages 0-17 years): Not on file     Are you homeless or worried that you might be in the future? (Adult - for ages 18 years and over): Not on file     Are you (or your family) homeless or worried that you might be in the future? (Household - for ages 0-17 years): Not on file        Current Outpatient Medications:     allopurinol (ZYLOPRIM) 100 mg tablet, Take 100 mg by mouth daily, Disp: , Rfl:     ammonium lactate (LAC-HYDRIN) 12 % cream, Apply topically 2 (two) times a day To dry skin of feet and calluses, Disp: 385 g, Rfl: 3    aspirin (ECOTRIN LOW STRENGTH) 81 mg EC tablet, Take 81 mg by mouth daily, Disp: , Rfl:     atorvastatin (LIPITOR) 20 mg tablet, Take 20 mg by mouth see administration instructions 3x a week, Monday, Wednesday, and Friday, Disp: , Rfl:     bisacodyl (DULCOLAX) 5 mg EC tablet, Take 1 tablet (5 mg total) by mouth daily as needed for constipation, Disp: 30 tablet, Rfl: 0    co-enzyme Q-10 100 mg capsule, Take 100 mg by mouth daily, Disp: , Rfl:     Continuous Glucose Sensor (FreeStyle Edgardo 3 Sensor) MISC, USE AS DIRECTED. USE TO MONITOR BLOOD GLUCOSE E11.9, Disp: , Rfl:     Empagliflozin (JARDIANCE) 10 MG TABS tablet, Take 10 mg by mouth daily, Disp: , Rfl:     gabapentin (NEURONTIN) 300 mg capsule, Take 1 capsule (300 mg total) by mouth 3 (three) times a day, Disp: 270 capsule, Rfl: 3    Insulin Glargine Solostar 100 UNIT/ML SOPN, Inject 35 Units as directed daily at bedtime, Disp: , Rfl:     levothyroxine 150 mcg tablet, TAKE 1 TABLET DAILY AT LEAST 30 MINUTES PRIOR TO BREAKFAST  OR OTHER MEDICATIONS (REPLACES 137 MCG), Disp: , Rfl:     linaGLIPtin 5 MG TABS, Take 5 mg by mouth daily (Patient not taking: Reported on 5/17/2024), Disp: , Rfl:     losartan (COZAAR) 25 mg tablet, Take 1 tablet (25 mg total) by mouth daily, Disp: 90 tablet, Rfl: 2    metoprolol succinate (TOPROL-XL) 100 mg 24 hr tablet, Take 100 mg by mouth daily, Disp: , Rfl:     Multiple Vitamins-Minerals (multivitamin with minerals) tablet, Take 1 tablet by mouth daily, Disp: , Rfl:     omeprazole (PriLOSEC) 20 mg delayed release capsule, 20 mg daily, Disp: , Rfl:     silver sulfadiazine (Silvadene) 1 % cream, Apply topically daily (Patient not taking: Reported on 8/7/2024), Disp: 25 g, Rfl: 0  No current facility-administered medications for this visit.  Family History   Problem Relation Age of Onset    Gout Mother     Diabetes Father     Heart disease Father       Review of Systems   Constitutional:  Negative for activity change, chills and fever.   HENT: Negative.     Respiratory:  Negative for cough, chest tightness and shortness of breath.    Cardiovascular:  Negative for chest pain and leg swelling.   Endocrine: Negative.    Genitourinary: Negative.    Skin:  Positive for wound.   Neurological:  Positive for numbness.   Psychiatric/Behavioral: Negative.  Negative for agitation and behavioral problems.          Objective:  /55   Pulse 58   Temp 98.6 °F (37 °C)   Resp 18     Physical Exam  Cardiovascular:      Comments: Right DP pulses palpable, PT pulses diminished. Toes warm and well perfused.   Musculoskeletal:         General: No tenderness.      Comments: S/p right 2nd toe partial amputation   Skin:     Findings: Lesion (Right lateral 5th toe, superficial without SOI) present. No erythema.      Comments: Right submet 3 and lateral hallux IPJ this HPK   Neurological:      Mental Status: He is oriented to person, place, and time.      Comments: +peripheral neuropathy. Diminished protective sensation to feet.   "            Wound 10/23/24 Toe D5, fifth Right (Active)   Wound Image   10/31/24 1351   Wound Description Epithelialization;Pink;Brown 10/31/24 1351   Lara-wound Assessment Callus 10/31/24 1351   Wound Length (cm) 0.1 cm 10/31/24 1351   Wound Width (cm) 0.1 cm 10/31/24 1351   Wound Depth (cm) 0.1 cm 10/31/24 1351   Wound Surface Area (cm^2) 0.01 cm^2 10/31/24 1351   Wound Volume (cm^3) 0.001 cm^3 10/31/24 1351   Calculated Wound Volume (cm^3) 0 cm^3 10/31/24 1351   Change in Wound Size % 100 10/31/24 1351   Drainage Amount None 10/31/24 1351   Drainage Description Serosanguineous 10/23/24 0819   Non-staged Wound Description Full thickness 10/23/24 0819       Wound 10/23/24 Toe D5, fifth Right;Posterior (Active)   Wound Image   10/31/24 1352   Wound Description Pink;Epithelialization 10/31/24 1354   Lara-wound Assessment Pink 10/31/24 1354   Wound Length (cm) 0 cm 10/31/24 1354   Wound Width (cm) 0 cm 10/31/24 1354   Wound Depth (cm) 0 cm 10/31/24 1354   Wound Surface Area (cm^2) 0 cm^2 10/31/24 1354   Wound Volume (cm^3) 0 cm^3 10/31/24 1354   Calculated Wound Volume (cm^3) 0 cm^3 10/31/24 1354   Change in Wound Size % 100 10/31/24 1354   Drainage Amount None 10/31/24 1354   Drainage Description Serosanguineous 10/23/24 0908   Non-staged Wound Description Partial thickness 10/23/24 0908           Debridement   Wound 10/23/24 Diabetic Ulcer Toe D5, fifth Right    Universal Protocol:  procedure performed by consultantConsent: Verbal consent obtained.  Risks and benefits: risks, benefits and alternatives were discussed  Consent given by: patient  Time out: Immediately prior to procedure a \"time out\" was called to verify the correct patient, procedure, equipment, support staff and site/side marked as required.  Patient understanding: patient states understanding of the procedure being performed  Patient consent: the patient's understanding of the procedure matches consent given  Patient identity confirmed: verbally with " patient    Debridement Details  Performed by: physician  Debridement type: selective      Post-debridement measurements  Length (cm): 0.1  Width (cm): 0.1  Depth (cm): 0.1  Percent debrided: 100%  Surface Area (cm^2): 0.01  Area Debrided (cm^2): 0.01  Volume (cm^3): 0    Devitalized tissue debrided: biofilm and callus  Instrument(s) utilized: blade  Technique utilized: nonexcisionalBleeding: small  Hemostasis obtained with: not applicable  Procedural pain (0-10): insensate  Post-procedural pain: insensate   Response to treatment: procedure was tolerated well                 Wound Instructions:  Orders Placed This Encounter   Procedures    Wound Procedure Treatment Right Toe D5, fifth     This order was created via procedure documentation    Debridement     This order was created via procedure documentation    Wound cleansing and dressings Diabetic Ulcer Right Toe D5, fifth     Wash your hands with soap and water.  Remove old dressing, discard into plastic bag and place in trash.   Cleanse the wound with mld, unscented soap (Dove)  prior to applying a clean dressing. Do not use tissue or cotton balls. Do not scrub the wound. Pat dry using gauze.     Shower no      Cleanse the wound with Vashe  Apply dermagran to the R toe wound.    Cover with gauze  Secure with clara and tape  Change dressing every other day     Off-loading Instructions:     Keep weight and pressure off wound at all times.  Wear off-loading device as directed by your physician.   Put on immediately when rising in the morning and remove when going to bed.        Watch for signs of infection (redness,warmth to the touch, increased pain, odor, pus, swelling, fever, chills, nausea, vomiting). If you notice any of these call the wound center or proceed to the ER.     Follow up in 2 weeks with Dr. Messer    Call [facility] to schedule an appt for your custom diabetic shoes, it may take awhile to get an appt.   Krista  [phone]    Brandon claudio  "pads and wear on your 1st and 2nd toe to prevent rubbing.     Standing Status:   Future     Standing Expiration Date:   11/14/2024         Traci Messer DPM      Portions of the record may have been created with voice recognition software. Occasional wrong word or \"sound a like\" substitutions may have occurred due to the inherent limitations of voice recognition software. Read the chart carefully and recognize, using context, where substitutions have occurred.    "

## 2024-11-12 ENCOUNTER — TELEPHONE (OUTPATIENT)
Dept: PODIATRY | Age: 64
End: 2024-11-12

## 2024-11-14 ENCOUNTER — OFFICE VISIT (OUTPATIENT)
Facility: CLINIC | Age: 64
End: 2024-11-14
Payer: COMMERCIAL

## 2024-11-14 VITALS
SYSTOLIC BLOOD PRESSURE: 139 MMHG | DIASTOLIC BLOOD PRESSURE: 67 MMHG | TEMPERATURE: 96.7 F | RESPIRATION RATE: 16 BRPM | HEART RATE: 57 BPM

## 2024-11-14 DIAGNOSIS — E11.59 TYPE 2 DIABETES MELLITUS WITH OTHER CIRCULATORY COMPLICATION, WITHOUT LONG-TERM CURRENT USE OF INSULIN (HCC): ICD-10-CM

## 2024-11-14 DIAGNOSIS — L97.512 DIABETIC ULCER OF TOE OF RIGHT FOOT ASSOCIATED WITH TYPE 2 DIABETES MELLITUS, WITH FAT LAYER EXPOSED (HCC): Primary | ICD-10-CM

## 2024-11-14 DIAGNOSIS — E11.621 DIABETIC ULCER OF TOE OF RIGHT FOOT ASSOCIATED WITH TYPE 2 DIABETES MELLITUS, WITH FAT LAYER EXPOSED (HCC): Primary | ICD-10-CM

## 2024-11-14 PROCEDURE — G0463 HOSPITAL OUTPT CLINIC VISIT: HCPCS | Performed by: STUDENT IN AN ORGANIZED HEALTH CARE EDUCATION/TRAINING PROGRAM

## 2024-11-14 PROCEDURE — 99212 OFFICE O/P EST SF 10 MIN: CPT | Performed by: STUDENT IN AN ORGANIZED HEALTH CARE EDUCATION/TRAINING PROGRAM

## 2024-11-14 PROCEDURE — 99213 OFFICE O/P EST LOW 20 MIN: CPT | Performed by: STUDENT IN AN ORGANIZED HEALTH CARE EDUCATION/TRAINING PROGRAM

## 2024-11-14 RX ORDER — LIDOCAINE 40 MG/G
CREAM TOPICAL ONCE
Status: COMPLETED | OUTPATIENT
Start: 2024-11-14 | End: 2024-11-14

## 2024-11-14 RX ADMIN — LIDOCAINE: 40 CREAM TOPICAL at 13:11

## 2024-11-14 NOTE — PROGRESS NOTES
Wound Procedure Treatment Diabetic Ulcer Right Toe D5, fifth    Performed by: Malcolm Rojas RN  Authorized by: Traci Messer DPM    Associated wounds:   Wound 10/23/24 Diabetic Ulcer Toe D5, fifth Right  Applied to periwound:  Moisture lotion  Offloading device appllied:  Foam padding

## 2024-11-14 NOTE — PROGRESS NOTES
Patient ID: Stalin Almonte is a 64 y.o. male Date of Birth 1960       No chief complaint on file.      Allergies:  Aspirin, Other, Felodipine, Lisinopril, Niacin, Nsaids, and Simvastatin    Diagnosis:  1. Diabetic ulcer of toe of right foot associated with type 2 diabetes mellitus, with fat layer exposed ([facility])  -     Wound cleansing and dressings Diabetic Ulcer Right Toe D5, fifth; Future; Expected date: 11/14/2024  -     lidocaine (LMX) 4 % cream  2. Type 2 diabetes mellitus with other circulatory complication, without long-term current use of insulin ([facility])     Diagnosis ICD-10-CM Associated Orders   1. Diabetic ulcer of toe of right foot associated with type 2 diabetes mellitus, with fat layer exposed ([facility])  E11.621 Wound cleansing and dressings Diabetic Ulcer Right Toe D5, fifth    L97.512 lidocaine (LMX) 4 % cream      2. Type 2 diabetes mellitus with other circulatory complication, without long-term current use of insulin ([facility])  E11.59            Assessment & Plan:  See wound orders.  (Aquaphor silicone sleeve)  - Right 5th toe diabetic ulcer has healed  - c/w offloading with surgical shoe until you get the custom DM shoes  - C/w  clinic for custom DM shoes and inserts (w/ appropriate cutouts/mod)  - A1c 9.2% 6/28/24. Much better BS control recommended.   - F/u 4-5wks for RFC in office. Call here if wound appears    Subjective:   Ed presents to clinic today concerning right 5th toe DFU. Doing wound care as instructed. Trying to keep blood sugar under control. Started wearing sneaker instead of surgical shoe AMA.          The following portions of the patient's history were reviewed and updated as appropriate:   Patient Active Problem List   Diagnosis    Osteomyelitis of foot, right, acute (HCC)    Acquired hypothyroidism    Stage 4 chronic kidney disease (HCC)    Essential hypertension    Chronic anemia    Type 2 diabetes mellitus with circulatory disorder, without long-term current use of insulin  (HCC)    GERD (gastroesophageal reflux disease)    Severe obesity with body mass index (BMI) of 35.0 to 39.9 with serious comorbidity (HCC)    Cellulitis of right toe    Hyperkalemia    Diabetic nephropathy associated with type 2 diabetes mellitus (HCC)    Benign hypertension with CKD (chronic kidney disease) stage III (HCC)    Persistent proteinuria    Callus    Type 2 diabetes mellitus with diabetic peripheral angiopathy and gangrene, without long-term current use of insulin (HCC)    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)    Diabetic peripheral neuropathy (HCC)    Polyneuropathy associated with underlying disease (HCC)    Diabetic ulcer of toe of right foot associated with type 2 diabetes mellitus, with fat layer exposed (HCC)     Past Medical History:   Diagnosis Date    Chronic kidney disease 2010    Chronic kidney failure, stage 4     COVID-19     12/2019    Diabetes mellitus (HCC)     GERD (gastroesophageal reflux disease)     Gout     Hypertension      Past Surgical History:   Procedure Laterality Date    ANKLE FRACTURE SURGERY Right     BONE BIOPSY Right 11/30/2022    Procedure: Right 3rd metatarsal head bone biopsy;  Surgeon: Traci Messer DPM;  Location: [facility];  Service: Podiatry    CHOLECYSTECTOMY      WA AMPUTATION METATARSAL W/TOE SINGLE Right 12/16/2022    Procedure: Partial 3rd RAY excision FOOT, plantar wound excision;  Surgeon: Traci Messer DPM;  Location: [facility];  Service: Podiatry    TOE AMPUTATION Right 11/2/2022    Procedure: RIGHT 2ND TOE PARTIAL AMPUTATION and right foot wound debridement;  Surgeon: Traci Messer DPM;  Location: [facility];  Service: Podiatry     Social History     Socioeconomic History    Marital status: Single     Spouse name: None    Number of children: None    Years of education: None    Highest education level: None   Occupational History    None   Tobacco Use    Smoking status: Former     Current packs/day: 0.00     Average  packs/day: 1 pack/day for 15.3 years (15.3 ttl pk-yrs)     Types: Cigarettes     Start date: 3/1/1975     Quit date: 1990     Years since quittin.4    Smokeless tobacco: Never   Vaping Use    Vaping status: Never Used   Substance and Sexual Activity    Alcohol use: Not Currently    Drug use: Never    Sexual activity: Yes     Partners: Female     Birth control/protection: None   Other Topics Concern    None   Social History Narrative    None     Social Drivers of Health     Financial Resource Strain: Low Risk  (3/28/2024)    Received from "[employer]    Financial Resource Strain     Do you have any trouble paying for your medications, or do you think you might in the future?: No     Does your family have trouble paying for medicine? (Household - for ages 0-17 years): Not on file   Food Insecurity: No Food Insecurity (3/28/2024)    Received from [facility]    Hunger Vital Sign     Worried About Running Out of Food in the Last Year: Never true     Ran Out of Food in the Last Year: Never true   Transportation Needs: No Transportation Needs (3/28/2024)    Received from "[employer]    Transportation Needs     READ ONLY Do you have trouble getting a ride to medical visits or work?: Never True     Does your family have a hard time getting a ride to doctors’ visits? (Household - for ages 0-17 years): Not on file     Has lack of transportation kept you from medical appointments, meetings, work, or from getting things needed for daily living? Check all that apply. (Adult - for ages 18 years and over): Not on file     Do you (or your family) have trouble finding or paying for a ride (transportation)? (Household - for ages 0-17 years): Not on file   Physical Activity: Not on file   Stress: Not on file   Social Connections: Socially Integrated (3/28/2024)    Received from "[employer]    Social Connections     How often do you feel lonely or isolated from those around you?: Never   Intimate Partner Violence: Not on  file   Housing Stability: Low Risk  (3/28/2024)    Received from [facility]     Do you currently live in a shelter or have no steady place to sleep at night?: No     READ ONLY Do you think you are at risk of becoming homeless?: No     Does your family worry about paying for your home or becoming homeless? (Household - for ages 0-17 years): Not on file     Are you homeless or worried that you might be in the future? (Adult - for ages 18 years and over): Not on file     Are you (or your family) homeless or worried that you might be in the future? (Household - for ages 0-17 years): Not on file        Current Outpatient Medications:     allopurinol (ZYLOPRIM) 100 mg tablet, Take 100 mg by mouth daily, Disp: , Rfl:     ammonium lactate (LAC-HYDRIN) 12 % cream, Apply topically 2 (two) times a day To dry skin of feet and calluses, Disp: 385 g, Rfl: 3    aspirin (ECOTRIN LOW STRENGTH) 81 mg EC tablet, Take 81 mg by mouth daily, Disp: , Rfl:     atorvastatin (LIPITOR) 20 mg tablet, Take 20 mg by mouth see administration instructions 3x a week, Monday, Wednesday, and Friday, Disp: , Rfl:     bisacodyl (DULCOLAX) 5 mg EC tablet, Take 1 tablet (5 mg total) by mouth daily as needed for constipation, Disp: 30 tablet, Rfl: 0    co-enzyme Q-10 100 mg capsule, Take 100 mg by mouth daily, Disp: , Rfl:     Continuous Glucose Sensor (FreeStyle Edgardo 3 Sensor) MISC, USE AS DIRECTED. USE TO MONITOR BLOOD GLUCOSE E11.9, Disp: , Rfl:     Empagliflozin (JARDIANCE) 10 MG TABS tablet, Take 10 mg by mouth daily, Disp: , Rfl:     gabapentin (NEURONTIN) 300 mg capsule, Take 1 capsule (300 mg total) by mouth 3 (three) times a day, Disp: 270 capsule, Rfl: 3    Insulin Glargine Solostar 100 UNIT/ML SOPN, Inject 35 Units as directed daily at bedtime, Disp: , Rfl:     levothyroxine 150 mcg tablet, TAKE 1 TABLET DAILY AT LEAST 30 MINUTES PRIOR TO BREAKFAST OR OTHER MEDICATIONS (REPLACES 137 MCG), Disp: , Rfl:     linaGLIPtin 5 MG  TABS, Take 5 mg by mouth daily (Patient not taking: Reported on 5/17/2024), Disp: , Rfl:     losartan (COZAAR) 25 mg tablet, Take 1 tablet (25 mg total) by mouth daily, Disp: 90 tablet, Rfl: 2    metoprolol succinate (TOPROL-XL) 100 mg 24 hr tablet, Take 100 mg by mouth daily, Disp: , Rfl:     Multiple Vitamins-Minerals (multivitamin with minerals) tablet, Take 1 tablet by mouth daily, Disp: , Rfl:     omeprazole (PriLOSEC) 20 mg delayed release capsule, 20 mg daily, Disp: , Rfl:     silver sulfadiazine (Silvadene) 1 % cream, Apply topically daily (Patient not taking: Reported on 8/7/2024), Disp: 25 g, Rfl: 0  No current facility-administered medications for this visit.  Family History   Problem Relation Age of Onset    Gout Mother     Diabetes Father     Heart disease Father       Review of Systems   Constitutional:  Negative for activity change, chills and fever.   HENT: Negative.     Respiratory:  Negative for cough, chest tightness and shortness of breath.    Cardiovascular:  Negative for chest pain and leg swelling.   Endocrine: Negative.    Genitourinary: Negative.    Skin:  Negative for wound.   Neurological:  Positive for numbness.   Psychiatric/Behavioral: Negative.  Negative for agitation and behavioral problems.          Objective:  /67   Pulse 57   Temp (!) 96.7 °F (35.9 °C)   Resp 16     Physical Exam  Cardiovascular:      Comments: Right DP pulses palpable, PT pulses diminished. Toes warm and well perfused.   Musculoskeletal:         General: No tenderness.      Comments: S/p right 2nd toe partial amputation   Skin:     Findings: No erythema or lesion (Right lateral 5th toe ulcer has healed).      Comments: Right submet 3 and lateral hallux IPJ this HPK   Neurological:      Mental Status: He is oriented to person, place, and time.      Comments: +peripheral neuropathy. Diminished protective sensation to feet.              Wound 10/23/24 Diabetic Ulcer Toe D5, fifth Right (Active)   Enter  "Alvarez score: Alvarez Grade 1: Partial or full-thickness ulcer (superficial) 10/31/24 1351   Wound Image   11/14/24 1309   Wound Description Epithelialized  11/14/24 1308   Lara-wound Assessment Callus 11/14/24 1308   Wound Length (cm) 0 cm 11/14/24 1308   Wound Width (cm) 0 cm 11/14/24 1308   Wound Depth (cm) 0 cm 11/14/24 1308   Wound Surface Area (cm^2) 0 cm^2 11/14/24 1308   Wound Volume (cm^3) 0cm^3 11/14/24 1308   Calculated Wound Volume (cm^3) 0 cm^3 11/14/24 1308                                     Wound Instructions:  Orders Placed This Encounter   Procedures    Wound cleansing and dressings Diabetic Ulcer Right Toe D5, fifth     Your wound is healed. Please call the wound care center for any future needs.          Apply Aquifer to the R toe to protect the skin.    You may use a silicone sleeve to protect the toe.  Continue to use a foam donut pad on the bottom of your foot     Standing Status:   Future     Expected Date:   11/14/2024     Expiration Date:   11/21/2024         Traci Messer DPM      Portions of the record may have been created with voice recognition software. Occasional wrong word or \"sound a like\" substitutions may have occurred due to the inherent limitations of voice recognition software. Read the chart carefully and recognize, using context, where substitutions have occurred.    "

## 2024-11-14 NOTE — PATIENT INSTRUCTIONS
Orders Placed This Encounter   Procedures    Wound cleansing and dressings Diabetic Ulcer Right Toe D5, fifth     Your wound is healed. Please call the wound care center for any future needs.          Apply Aquifer to the R toe to protect the skin.    You may use a silicone sleeve to protect the toe.  Continue to use a foam donut pad on the bottom of your foot     Standing Status:   Future     Expected Date:   11/14/2024     Expiration Date:   11/21/2024

## 2025-01-03 ENCOUNTER — OFFICE VISIT (OUTPATIENT)
Dept: PODIATRY | Age: 65
End: 2025-01-03
Payer: COMMERCIAL

## 2025-01-03 VITALS
HEART RATE: 58 BPM | DIASTOLIC BLOOD PRESSURE: 86 MMHG | WEIGHT: 244.4 LBS | SYSTOLIC BLOOD PRESSURE: 103 MMHG | BODY MASS INDEX: 34.99 KG/M2 | HEIGHT: 70 IN | TEMPERATURE: 98.8 F | OXYGEN SATURATION: 96 %

## 2025-01-03 DIAGNOSIS — L84 CALLUS: ICD-10-CM

## 2025-01-03 DIAGNOSIS — B35.1 ONYCHOMYCOSIS: ICD-10-CM

## 2025-01-03 DIAGNOSIS — E11.52 TYPE 2 DIABETES MELLITUS WITH DIABETIC PERIPHERAL ANGIOPATHY AND GANGRENE, WITHOUT LONG-TERM CURRENT USE OF INSULIN (HCC): Primary | ICD-10-CM

## 2025-01-03 PROCEDURE — RECHECK: Performed by: STUDENT IN AN ORGANIZED HEALTH CARE EDUCATION/TRAINING PROGRAM

## 2025-01-03 PROCEDURE — 11721 DEBRIDE NAIL 6 OR MORE: CPT | Performed by: STUDENT IN AN ORGANIZED HEALTH CARE EDUCATION/TRAINING PROGRAM

## 2025-01-03 PROCEDURE — 11056 PARNG/CUTG B9 HYPRKR LES 2-4: CPT | Performed by: STUDENT IN AN ORGANIZED HEALTH CARE EDUCATION/TRAINING PROGRAM

## 2025-01-03 RX ORDER — PEN NEEDLE, DIABETIC 32 GX 1/4"
NEEDLE, DISPOSABLE MISCELLANEOUS
COMMUNITY
Start: 2024-11-11

## 2025-01-03 NOTE — PROGRESS NOTES
Stalin SQUIRES Gage  1960  AT RISK FOOT CARE    1. Type 2 diabetes mellitus with diabetic peripheral angiopathy and gangrene, without long-term current use of insulin (HCC)        2. Onychomycosis        3. Callus              Patient presents for at-risk foot care.  Patient has not acute concerns. Patient has significant lower extremity risk due to diminished pulses in the feet and trophic skin changes to the lower extremity including thick toenail, atrophic skin, and decreased hair growth.     Has appt for new DM shoes this coming week.    On exam patient has thickened, hypertrophic, discolored, brittle toenails with subungual debris and tenderness x8   Callus: 2 (R submet 2, L submet 1)  Amputation: 2 (right 2nd toe partial amputation and partial amputation 3rd ray right foot)    Today's treatment includes:  Debridement of toenails x8. Using nail nipper, ld, and curette, nails were sharply debrided, reduced in thickness and length. Devitalized nail tissue and fungal debris excised and removed. Patient tolerated well.    Paring of callus x2. Using a #15 blade the hyperkeratotic tissue below the 2nd metatarsal head right foot was pared in thickness to normal epithelium. C/w Dr. Rivera's Gels silicone metatarsal pad, achilles stretching, Amlactin.  Report for custom DM shoes and inserts w/ callus cutouts      Discussed proper shoe gear, daily inspections of feet, and general foot health with patient.   Patient has Q7 findings and is recommended for at risk foot care every 9-10 weeks.     Patients most recent complete clinical exam was performed 3/27/24

## 2025-03-14 ENCOUNTER — OFFICE VISIT (OUTPATIENT)
Dept: PODIATRY | Age: 65
End: 2025-03-14
Payer: COMMERCIAL

## 2025-03-14 VITALS — BODY MASS INDEX: 34.65 KG/M2 | WEIGHT: 242 LBS | HEIGHT: 70 IN

## 2025-03-14 DIAGNOSIS — L84 CALLUS: ICD-10-CM

## 2025-03-14 DIAGNOSIS — B35.1 ONYCHOMYCOSIS: Primary | ICD-10-CM

## 2025-03-14 DIAGNOSIS — E11.52 TYPE 2 DIABETES MELLITUS WITH DIABETIC PERIPHERAL ANGIOPATHY AND GANGRENE, WITHOUT LONG-TERM CURRENT USE OF INSULIN (HCC): ICD-10-CM

## 2025-03-14 PROCEDURE — RECHECK: Performed by: STUDENT IN AN ORGANIZED HEALTH CARE EDUCATION/TRAINING PROGRAM

## 2025-03-14 PROCEDURE — 11056 PARNG/CUTG B9 HYPRKR LES 2-4: CPT | Performed by: STUDENT IN AN ORGANIZED HEALTH CARE EDUCATION/TRAINING PROGRAM

## 2025-03-14 PROCEDURE — 11721 DEBRIDE NAIL 6 OR MORE: CPT | Performed by: STUDENT IN AN ORGANIZED HEALTH CARE EDUCATION/TRAINING PROGRAM

## 2025-03-14 RX ORDER — AMOXICILLIN 500 MG
1 CAPSULE ORAL DAILY
COMMUNITY
Start: 2025-02-11

## 2025-03-14 RX ORDER — VITAMIN B COMPLEX
1 CAPSULE ORAL DAILY
COMMUNITY
Start: 2025-02-11

## 2025-03-14 RX ORDER — BLOOD SUGAR DIAGNOSTIC
STRIP MISCELLANEOUS
COMMUNITY
Start: 2025-01-27

## 2025-03-14 NOTE — PROGRESS NOTES
Stalin Almonte  1960  AT RISK FOOT CARE    1. Onychomycosis        2. Callus        3. Type 2 diabetes mellitus with diabetic peripheral angiopathy and gangrene, without long-term current use of insulin (HCC)                Patient presents for at-risk foot care.  Patient has not acute concerns. Patient has significant lower extremity risk due to diminished pulses in the feet and trophic skin changes to the lower extremity including thick toenail, atrophic skin, and decreased hair growth.     Finally go his new DM shoe/inserts yesterday from [facility]. Not using cream daily.     On exam patient has thickened, hypertrophic, discolored, brittle toenails with subungual debris and tenderness x8   Callus: 2 (R submet 2, L submet 1)  Amputation: 2 (right 2nd toe partial amputation and partial amputation 3rd ray right foot)    Today's treatment includes:  Debridement of toenails x8. Using nail nipper, ld, and curette, nails were sharply debrided, reduced in thickness and length. Devitalized nail tissue and fungal debris excised and removed. Patient tolerated well.    Paring of callus x2. Using a #15 blade the hyperkeratotic tissue below the 2nd metatarsal head right foot was pared in thickness to normal epithelium. C/w daily achilles stretching, Amlactin.      Discussed proper shoe gear, daily inspections of feet, and general foot health with patient.   Patient has Q7 findings and is recommended for at risk foot care every 9-10 weeks.     Patients most recent complete clinical exam was performed 3/14/25    Diabetic Foot Exam    Patient's shoes and socks removed.    Right Foot/Ankle   Right Foot Inspection  Skin Exam: skin intact, dry skin, callus and callus. No warmth, no erythema, no maceration, no abnormal color, no pre-ulcer and no ulcer.     Toe Exam: right toe deformity (HTs).     Sensory   Vibration: absent  Proprioception: absent  Monofilament testing: absent    Vascular  Capillary refills: < 3 seconds  The right  DP pulse is 1+. The right PT pulse is 1+.     Left Foot/Ankle  Left Foot Inspection  Skin Exam: skin intact, dry skin and callus. No warmth, no erythema, no maceration, normal color, no pre-ulcer and no ulcer.     Toe Exam: left toe deformity (right 2nd toe partial amputation and partial amputation 3rd ray right foot. HTs).     Sensory   Vibration: absent  Proprioception: absent  Monofilament testing: absent    Vascular  Capillary refills: < 3 seconds  The left DP pulse is 1+. The left PT pulse is 1+.     Assign Risk Category  Deformity present  Loss of protective sensation  No weak pulses  Risk: 3

## 2025-04-17 ENCOUNTER — HOSPITAL ENCOUNTER (OUTPATIENT)
Dept: CT IMAGING | Facility: HOSPITAL | Age: 65
End: 2025-04-17
Attending: INTERNAL MEDICINE
Payer: COMMERCIAL

## 2025-04-17 DIAGNOSIS — R10.13 EPIGASTRIC PAIN: ICD-10-CM

## 2025-04-17 PROCEDURE — 74176 CT ABD & PELVIS W/O CONTRAST: CPT

## 2025-05-01 ENCOUNTER — HOSPITAL ENCOUNTER (OUTPATIENT)
Dept: NON INVASIVE DIAGNOSTICS | Facility: HOSPITAL | Age: 65
Discharge: HOME | End: 2025-05-01

## 2025-05-01 DIAGNOSIS — N18.4 CKD (CHRONIC KIDNEY DISEASE), STAGE IV (HCC): ICD-10-CM

## 2025-05-02 ENCOUNTER — APPOINTMENT (EMERGENCY)
Dept: CT IMAGING | Facility: HOSPITAL | Age: 65
End: 2025-05-02
Payer: COMMERCIAL

## 2025-05-02 ENCOUNTER — HOSPITAL ENCOUNTER (EMERGENCY)
Facility: HOSPITAL | Age: 65
Discharge: HOME | End: 2025-05-02
Attending: EMERGENCY MEDICINE
Payer: COMMERCIAL

## 2025-05-02 VITALS
DIASTOLIC BLOOD PRESSURE: 70 MMHG | SYSTOLIC BLOOD PRESSURE: 133 MMHG | HEIGHT: 70 IN | OXYGEN SATURATION: 98 % | WEIGHT: 241.4 LBS | TEMPERATURE: 97.7 F | RESPIRATION RATE: 19 BRPM | BODY MASS INDEX: 34.56 KG/M2 | HEART RATE: 50 BPM

## 2025-05-02 DIAGNOSIS — R10.9 ABDOMINAL PAIN: Primary | ICD-10-CM

## 2025-05-02 LAB
ALBUMIN SERPL BCG-MCNC: 4.2 G/DL (ref 3.5–5)
ALP SERPL-CCNC: 58 U/L (ref 34–104)
ALT SERPL W P-5'-P-CCNC: 22 U/L (ref 7–52)
ANION GAP SERPL CALCULATED.3IONS-SCNC: 6 MMOL/L (ref 4–13)
AST SERPL W P-5'-P-CCNC: 21 U/L (ref 13–39)
ATRIAL RATE: 59 BPM
BASOPHILS # BLD AUTO: 0.03 THOUSANDS/ÂΜL (ref 0–0.1)
BASOPHILS NFR BLD AUTO: 0 % (ref 0–1)
BILIRUB SERPL-MCNC: 0.44 MG/DL (ref 0.2–1)
BUN SERPL-MCNC: 34 MG/DL (ref 5–25)
CALCIUM SERPL-MCNC: 9 MG/DL (ref 8.4–10.2)
CARDIAC TROPONIN I PNL SERPL HS: 5 NG/L (ref ?–50)
CHLORIDE SERPL-SCNC: 110 MMOL/L (ref 96–108)
CO2 SERPL-SCNC: 23 MMOL/L (ref 21–32)
CREAT SERPL-MCNC: 2.58 MG/DL (ref 0.6–1.3)
EOSINOPHIL # BLD AUTO: 0.25 THOUSAND/ÂΜL (ref 0–0.61)
EOSINOPHIL NFR BLD AUTO: 4 % (ref 0–6)
ERYTHROCYTE [DISTWIDTH] IN BLOOD BY AUTOMATED COUNT: 12.6 % (ref 11.6–15.1)
GFR SERPL CREATININE-BSD FRML MDRD: 24 ML/MIN/1.73SQ M
GLUCOSE SERPL-MCNC: 145 MG/DL (ref 65–140)
HCT VFR BLD AUTO: 33.5 % (ref 36.5–49.3)
HGB BLD-MCNC: 11.3 G/DL (ref 12–17)
IMM GRANULOCYTES # BLD AUTO: 0.02 THOUSAND/UL (ref 0–0.2)
IMM GRANULOCYTES NFR BLD AUTO: 0 % (ref 0–2)
LACTATE SERPL-SCNC: 0.5 MMOL/L (ref 0.5–2)
LIPASE SERPL-CCNC: 24 U/L (ref 11–82)
LYMPHOCYTES # BLD AUTO: 2.65 THOUSANDS/ÂΜL (ref 0.6–4.47)
LYMPHOCYTES NFR BLD AUTO: 37 % (ref 14–44)
MAGNESIUM SERPL-MCNC: 2.1 MG/DL (ref 1.9–2.7)
MCH RBC QN AUTO: 35.1 PG (ref 26.8–34.3)
MCHC RBC AUTO-ENTMCNC: 33.7 G/DL (ref 31.4–37.4)
MCV RBC AUTO: 104 FL (ref 82–98)
MONOCYTES # BLD AUTO: 0.71 THOUSAND/ÂΜL (ref 0.17–1.22)
MONOCYTES NFR BLD AUTO: 10 % (ref 4–12)
NEUTROPHILS # BLD AUTO: 3.45 THOUSANDS/ÂΜL (ref 1.85–7.62)
NEUTS SEG NFR BLD AUTO: 49 % (ref 43–75)
NRBC BLD AUTO-RTO: 0 /100 WBCS
P AXIS: 11 DEGREES
PLATELET # BLD AUTO: 191 THOUSANDS/UL (ref 149–390)
PMV BLD AUTO: 10.2 FL (ref 8.9–12.7)
POTASSIUM SERPL-SCNC: 4.7 MMOL/L (ref 3.5–5.3)
PR INTERVAL: 152 MS
PROT SERPL-MCNC: 7.1 G/DL (ref 6.4–8.4)
QRS AXIS: 6 DEGREES
QRSD INTERVAL: 92 MS
QT INTERVAL: 416 MS
QTC INTERVAL: 411 MS
RBC # BLD AUTO: 3.22 MILLION/UL (ref 3.88–5.62)
SODIUM SERPL-SCNC: 139 MMOL/L (ref 135–147)
T WAVE AXIS: 12 DEGREES
VENTRICULAR RATE: 59 BPM
WBC # BLD AUTO: 7.11 THOUSAND/UL (ref 4.31–10.16)

## 2025-05-02 PROCEDURE — 96375 TX/PRO/DX INJ NEW DRUG ADDON: CPT

## 2025-05-02 PROCEDURE — 99285 EMERGENCY DEPT VISIT HI MDM: CPT

## 2025-05-02 PROCEDURE — 83605 ASSAY OF LACTIC ACID: CPT | Performed by: EMERGENCY MEDICINE

## 2025-05-02 PROCEDURE — 96374 THER/PROPH/DIAG INJ IV PUSH: CPT

## 2025-05-02 PROCEDURE — 80053 COMPREHEN METABOLIC PANEL: CPT | Performed by: EMERGENCY MEDICINE

## 2025-05-02 PROCEDURE — 74176 CT ABD & PELVIS W/O CONTRAST: CPT

## 2025-05-02 PROCEDURE — 83735 ASSAY OF MAGNESIUM: CPT | Performed by: EMERGENCY MEDICINE

## 2025-05-02 PROCEDURE — 93005 ELECTROCARDIOGRAM TRACING: CPT

## 2025-05-02 PROCEDURE — 36415 COLL VENOUS BLD VENIPUNCTURE: CPT | Performed by: EMERGENCY MEDICINE

## 2025-05-02 PROCEDURE — 99284 EMERGENCY DEPT VISIT MOD MDM: CPT | Performed by: EMERGENCY MEDICINE

## 2025-05-02 PROCEDURE — 84484 ASSAY OF TROPONIN QUANT: CPT | Performed by: EMERGENCY MEDICINE

## 2025-05-02 PROCEDURE — 85025 COMPLETE CBC W/AUTO DIFF WBC: CPT | Performed by: EMERGENCY MEDICINE

## 2025-05-02 PROCEDURE — 83690 ASSAY OF LIPASE: CPT | Performed by: EMERGENCY MEDICINE

## 2025-05-02 RX ORDER — ONDANSETRON 2 MG/ML
4 INJECTION INTRAMUSCULAR; INTRAVENOUS ONCE
Status: COMPLETED | OUTPATIENT
Start: 2025-05-02 | End: 2025-05-02

## 2025-05-02 RX ORDER — FAMOTIDINE 20 MG/1
20 TABLET, FILM COATED ORAL 2 TIMES DAILY
Qty: 60 TABLET | Refills: 0 | Status: SHIPPED | OUTPATIENT
Start: 2025-05-02 | End: 2025-06-01

## 2025-05-02 RX ADMIN — ONDANSETRON 4 MG: 2 INJECTION INTRAMUSCULAR; INTRAVENOUS at 14:17

## 2025-05-02 RX ADMIN — MORPHINE SULFATE 2 MG: 2 INJECTION, SOLUTION INTRAMUSCULAR; INTRAVENOUS at 14:17

## 2025-05-02 NOTE — ED PROVIDER NOTES
Time reflects when diagnosis was documented in both MDM as applicable and the Disposition within this note       Time User Action Codes Description Comment    5/2/2025  3:47 PM DemetrisMarioy Add [R10.9] Abdominal pain           ED Disposition       ED Disposition   Discharge    Condition   Stable    Date/Time   Fri May 2, 2025  3:48 PM    Comment   Stalin Almonte discharge to home/self care.                   Assessment & Plan       Medical Decision Making  Patient presented to the emergency department and a MSE was performed. The patient was evaluated for complaint related to acute abdominal pain in a male patient.  Patient is potentially at risk for, but not limited to, acute gastritis, pancreatitis, urinary infection, kidney stone, appendicitis, diverticulitis, diverticulosis, ulcerative colitis, Crohn's disease, enteritis, viral gastroenteritis, constipation, or other disease process unrelated to the abdomen which may cause this symptomatology is also considered. Several of these diagnoses have been evaluated and ruled out by history and physical.  As needed, patient will be further evaluated with laboratory and imaging studies.  Higher level diagnostics, such as CT imaging or ultrasound, may also be required.  Please see work-up portion of the note for further evaluation of patient's risk.  Socioeconomic factors were also considered as part of the decision-making process.  Unless otherwise stated in the chart or patient is admitted as elsewhere documented, any previously prescribed medications will be maintained.      Problems Addressed:  Abdominal pain: chronic illness or injury     Details: No acute findings to account for patient's symptomatology.  CT negative.  Will change GI medications and refer to outpatient follow-up with gastroenterology.    Amount and/or Complexity of Data Reviewed  Independent Historian: spouse  Labs: ordered. Decision-making details documented in ED Course.  Radiology: ordered.  Decision-making details documented in ED Course.  ECG/medicine tests: ordered and independent interpretation performed. Decision-making details documented in ED Course.    Risk  Prescription drug management.        ED Course as of 05/02/25 1551   Fri May 02, 2025   1539 Chronic renal insufficiency       Medications   ondansetron (ZOFRAN) injection 4 mg (4 mg Intravenous Given 5/2/25 1417)   morphine injection 2 mg (2 mg Intravenous Given 5/2/25 1417)       ED Risk Strat Scores                    No data recorded                            History of Present Illness       Chief Complaint   Patient presents with    Chest Pain     Pt reports pain that starts in epigastric area and radiates into left chest and back- intermittent for weeks and worsening        Past Medical History:   Diagnosis Date    Chronic kidney disease 2010    Chronic kidney failure, stage 4     COVID-19     12/2019    Diabetes mellitus (HCC)     GERD (gastroesophageal reflux disease)     Gout     Hypertension       Past Surgical History:   Procedure Laterality Date    ANKLE FRACTURE SURGERY Right     BONE BIOPSY Right 11/30/2022    Procedure: Right 3rd metatarsal head bone biopsy;  Surgeon: Traci Messer DPM;  Location: [facility];  Service: Podiatry    CHOLECYSTECTOMY      VA AMPUTATION METATARSAL W/TOE SINGLE Right 12/16/2022    Procedure: Partial 3rd RAY excision FOOT, plantar wound excision;  Surgeon: Traci Messer DPM;  Location: [facility];  Service: Podiatry    TOE AMPUTATION Right 11/2/2022    Procedure: RIGHT 2ND TOE PARTIAL AMPUTATION and right foot wound debridement;  Surgeon: Traci Messer DPM;  Location: [facility];  Service: Podiatry      Family History   Problem Relation Age of Onset    Gout Mother     Diabetes Father     Heart disease Father       Social History     Tobacco Use    Smoking status: Former     Current packs/day: 0.00     Average packs/day: 1 pack/day for 15.3 years (15.3 ttl pk-yrs)     Types: Cigarettes     Start  date: 3/1/1975     Quit date: 1990     Years since quittin.9    Smokeless tobacco: Never   Vaping Use    Vaping status: Never Used   Substance Use Topics    Alcohol use: Not Currently    Drug use: Never      E-Cigarette/Vaping    E-Cigarette Use Never User       E-Cigarette/Vaping Substances      I have reviewed and agree with the history as documented.     65-year-old male with abdominal pain ongoing for about 3 to 4 weeks.  Now worsening.  Now feeling distended.  Radiation to the left chest and back.  Patient with history of diabetes, GERD, gout, hypertension, kidney disease.  Also had history of cholecystectomy.      History provided by:  Patient and spouse  Chest Pain  Pain location:  Epigastric  Pain quality: aching    Associated symptoms: abdominal pain and nausea    Associated symptoms: no fever and no shortness of breath        Review of Systems   Constitutional:  Negative for fever.   Respiratory:  Negative for shortness of breath and wheezing.    Cardiovascular:  Positive for chest pain.   Gastrointestinal:  Positive for abdominal pain and nausea. Negative for diarrhea.   Genitourinary: Negative.            Objective       ED Triage Vitals   Temperature Pulse Blood Pressure Respirations SpO2 Patient Position - Orthostatic VS   25 1400 25 1400 25 1400 25 1400 25 1400 25 1400   97.7 °F (36.5 °C) 69 124/89 18 100 % Sitting      Temp Source Heart Rate Source BP Location FiO2 (%) Pain Score    25 1400 25 1400 25 1400 -- 25 1417    Temporal Monitor Left arm  5      Vitals      Date and Time Temp Pulse SpO2 Resp BP Pain Score FACES Pain Rating User   25 1417 -- -- -- -- -- 5 -- AS   25 1415 -- 58 97 % 16 121/65 -- -- AS   25 1400 97.7 °F (36.5 °C) 69 100 % 18 124/89 -- -- SA            Physical Exam  Vitals (BMI 30-35) and nursing note reviewed.   Constitutional:       General: He is in acute distress.      Appearance: He is  normal weight. He is not ill-appearing or toxic-appearing.   HENT:      Head: Normocephalic and atraumatic.      Right Ear: External ear normal.      Left Ear: External ear normal.      Nose: Nose normal.      Mouth/Throat:      Mouth: Mucous membranes are moist.   Cardiovascular:      Rate and Rhythm: Normal rate.   Pulmonary:      Effort: Pulmonary effort is normal. No respiratory distress.      Breath sounds: No decreased breath sounds, wheezing, rhonchi or rales.   Abdominal:      General: Abdomen is flat. Bowel sounds are normal. There is no distension.      Palpations: There is no mass.      Tenderness: There is abdominal tenderness in the epigastric area, left upper quadrant and left lower quadrant.   Musculoskeletal:         General: No signs of injury.   Skin:     Coloration: Skin is not pale.   Neurological:      General: No focal deficit present.      Mental Status: He is alert.   Psychiatric:         Mood and Affect: Mood normal.         Thought Content: Thought content normal.         Judgment: Judgment normal.         Results Reviewed       Procedure Component Value Units Date/Time    HS Troponin 0hr (reflex protocol) [[id number]]  (Normal) Collected: 05/02/25 1416    Lab Status: Final result Specimen: Blood from Arm, Right Updated: 05/02/25 1453     hs TnI 0hr 5 ng/L     HS Troponin I 2hr [[id number]]     Lab Status: No result Specimen: Blood     Comprehensive metabolic panel [[id number]]  (Abnormal) Collected: 05/02/25 1416    Lab Status: Final result Specimen: Blood from Arm, Right Updated: 05/02/25 1450     Sodium 139 mmol/L      Potassium 4.7 mmol/L      Chloride 110 mmol/L      CO2 23 mmol/L      ANION GAP 6 mmol/L      BUN 34 mg/dL      Creatinine 2.58 mg/dL      Glucose 145 mg/dL      Calcium 9.0 mg/dL      AST 21 U/L      ALT 22 U/L      Alkaline Phosphatase 58 U/L      Total Protein 7.1 g/dL      Albumin 4.2 g/dL      Total Bilirubin 0.44 mg/dL      eGFR 24 ml/min/1.73sq m     Narrative:       National Kid[employer] Disease Foundation guidelines for Chronic Kidney Disease (CKD):     Stage 1 with normal or high GFR (GFR > 90 mL/min/1.73 square meters)    Stage 2 Mild CKD (GFR = 60-89 mL/min/1.73 square meters)    Stage 3A Moderate CKD (GFR = 45-59 mL/min/1.73 square meters)    Stage 3B Moderate CKD (GFR = 30-44 mL/min/1.73 square meters)    Stage 4 Severe CKD (GFR = 15-29 mL/min/1.73 square meters)    Stage 5 End Stage CKD (GFR <15 mL/min/1.73 square meters)  Note: GFR calculation is accurate only with a steady state creatinine    Lipase [[id number]]  (Normal) Collected: 05/02/25 1416    Lab Status: Final result Specimen: Blood from Arm, Right Updated: 05/02/25 1450     Lipase 24 u/L     Magnesium [[id number]]  (Normal) Collected: 05/02/25 1416    Lab Status: Final result Specimen: Blood from Arm, Right Updated: 05/02/25 1450     Magnesium 2.1 mg/dL     Lactic acid, plasma (w/reflex if result > 2.0) [[id number]]  (Normal) Collected: 05/02/25 1416    Lab Status: Final result Specimen: Blood from Arm, Right Updated: 05/02/25 1449     LACTIC ACID 0.5 mmol/L     Narrative:      Result may be elevated if tourniquet was used during collection.    CBC and differential [[id number]]  (Abnormal) Collected: 05/02/25 1416    Lab Status: Final result Specimen: Blood from Arm, Right Updated: 05/02/25 1425     WBC 7.11 Thousand/uL      RBC 3.22 Million/uL      Hemoglobin 11.3 g/dL      Hematocrit 33.5 %       fL      MCH 35.1 pg      MCHC 33.7 g/dL      RDW 12.6 %      MPV 10.2 fL      Platelets 191 Thousands/uL      nRBC 0 /100 WBCs      Segmented % 49 %      Immature Grans % 0 %      Lymphocytes % 37 %      Monocytes % 10 %      Eosinophils Relative 4 %      Basophils Relative 0 %      Absolute Neutrophils 3.45 Thousands/µL      Absolute Immature Grans 0.02 Thousand/uL      Absolute Lymphocytes 2.65 Thousands/µL      Absolute Monocytes 0.71 Thousand/µL      Eosinophils Absolute 0.25 Thousand/µL      Basophils Absolute 0.03  Thousands/µL             CT abdomen pelvis wo contrast   Final Interpretation by Nery Martinez MD (05/02 7245)      No acute findings in the abdomen or pelvis within the limits of unenhanced technique. Bilateral fat-containing inguinal hernias.      Mild liver surface nodularity which may be due to chronic liver disease.      Workstation performed: [id number]             ECG 12 Lead Documentation Only    Date/Time: 5/2/2025 2:28 PM    Performed by: Kash Willson DO  Authorized by: Kash Willson DO    Indications / Diagnosis:  Chest pain  ECG reviewed by me, the ED Provider: yes    Patient location:  ED  Previous ECG:     Previous ECG:  Unavailable  Interpretation:     Interpretation: normal    Rate:     ECG rate assessment: normal    Rhythm:     Rhythm: sinus rhythm    Ectopy:     Ectopy: none    QRS:     QRS axis:  Normal  Conduction:     Conduction: normal    ST segments:     ST segments:  Normal  T waves:     T waves: normal        ED Medication and Procedure Management   Prior to Admission Medications   Prescriptions Last Dose Informant Patient Reported? Taking?   BD Pen Needle Micro U/F 32G X 6 MM MISC   Yes No   Sig: USE AS DIRECTED WITH INSULIN DAILY   BERBERINE CHLORIDE PO   Yes No   Sig: Take 1200 mg twice daily   Continuous Glucose Sensor (FreeStyle Edgardo 3 Sensor) MISC   Yes No   Sig: USE AS DIRECTED. USE TO MONITOR BLOOD GLUCOSE E11.9   Empagliflozin (JARDIANCE) 10 MG TABS tablet   Yes No   Sig: Take 10 mg by mouth daily   Insulin Glargine Solostar 100 UNIT/ML SOPN   Yes No   Sig: Inject 30 Units as directed 2 (two) times a day   Multiple Vitamins-Minerals (multivitamin with minerals) tablet   Yes No   Sig: Take 1 tablet by mouth daily   Omega-3 Fatty Acids (Fish Oil) 1200 MG CAPS   Yes No   Sig: Take 1 capsule by mouth daily   OneTouch Ultra test strip   Yes No   Sig: USE ONCE A DAY AS NEEDED FOR HYPERGLYCEMIA (HIGH SUGAR)   allopurinol (ZYLOPRIM) 100 mg tablet  Self Yes No   Sig: Take 100 mg by  mouth daily   ammonium lactate (LAC-HYDRIN) 12 % cream   No No   Sig: Apply topically 2 (two) times a day To dry skin of feet and calluses   aspirin (ECOTRIN LOW STRENGTH) 81 mg EC tablet  Self Yes No   Sig: Take 81 mg by mouth daily   atorvastatin (LIPITOR) 20 mg tablet  Self Yes No   Sig: Take 20 mg by mouth see administration instructions 3x a week, Monday, Wednesday, and Friday   b complex vitamins capsule   Yes No   Sig: Take 1 tablet by mouth daily   bisacodyl (DULCOLAX) 5 mg EC tablet   No No   Sig: Take 1 tablet (5 mg total) by mouth daily as needed for constipation   co-enzyme Q-10 100 mg capsule   Yes No   Sig: Take 100 mg by mouth daily   gabapentin (NEURONTIN) 300 mg capsule   No No   Sig: Take 1 capsule (300 mg total) by mouth 3 (three) times a day   levothyroxine 150 mcg tablet   Yes No   Sig: TAKE 1 TABLET DAILY AT LEAST 30 MINUTES PRIOR TO BREAKFAST OR OTHER MEDICATIONS (REPLACES 137 MCG)   losartan (COZAAR) 25 mg tablet   No No   Sig: Take 1 tablet (25 mg total) by mouth daily   metoprolol succinate (TOPROL-XL) 100 mg 24 hr tablet   Yes No   Sig: Take 100 mg by mouth daily   omeprazole (PriLOSEC) 20 mg delayed release capsule  Self Yes No   Si mg daily      Facility-Administered Medications: None     Patient's Medications   Discharge Prescriptions    FAMOTIDINE (PEPCID) 20 MG TABLET    Take 1 tablet (20 mg total) by mouth 2 (two) times a day       Start Date: 2025  End Date: 2025       Order Dose: 20 mg       Quantity: 60 tablet    Refills: 0       ED SEPSIS DOCUMENTATION   Time reflects when diagnosis was documented in both MDM as applicable and the Disposition within this note       Time User Action Codes Description Comment    2025  3:47 PM Kash Willson Add [R10.9] Abdominal pain                  Kash Willson,   25 1928

## 2025-05-02 NOTE — DISCHARGE INSTRUCTIONS
Take medications as prescribed.  Return with any worsening.  Follow-up with gastroenterology.    Thank you for choosing the emergency department at [facility]. We appreciated the opportunity and privilege to address your healthcare needs. We remain available to you should you require additional evaluation or assistance. We value your feedback and would appreciate the opportunity to address anything you identified as an opportunity to improve or where we excelled. If there are colleagues who deserve special recognition, please let us know! We hope you are feeling better soon!

## 2025-05-08 ENCOUNTER — HOSPITAL ENCOUNTER (OUTPATIENT)
Dept: NON INVASIVE DIAGNOSTICS | Facility: HOSPITAL | Age: 65
Discharge: HOME | End: 2025-05-08
Payer: COMMERCIAL

## 2025-05-08 VITALS
SYSTOLIC BLOOD PRESSURE: 130 MMHG | HEIGHT: 70 IN | DIASTOLIC BLOOD PRESSURE: 70 MMHG | WEIGHT: 241 LBS | BODY MASS INDEX: 34.5 KG/M2 | HEART RATE: 68 BPM

## 2025-05-08 LAB
AORTIC ROOT: 3.2 CM
ASCENDING AORTA: 3.4 CM
BSA FOR ECHO PROCEDURE: 2.26 M2
E WAVE DECELERATION TIME: 258 MS
E/A RATIO: 0.82
FRACTIONAL SHORTENING: 33 (ref 28–44)
INTERVENTRICULAR SEPTUM IN DIASTOLE (PARASTERNAL SHORT AXIS VIEW): 1.1 CM
INTERVENTRICULAR SEPTUM: 1.1 CM (ref 0.6–1.1)
LAAS-AP2: 17.5 CM2
LAAS-AP4: 17.8 CM2
LEFT ATRIUM SIZE: 4.1 CM
LEFT ATRIUM VOLUME (MOD BIPLANE): 48 ML
LEFT ATRIUM VOLUME INDEX (MOD BIPLANE): 21.2 ML/M2
LEFT INTERNAL DIMENSION IN SYSTOLE: 3.5 CM (ref 2.1–4)
LEFT VENTRICLE DIASTOLIC VOLUME (MOD BIPLANE): 83 ML
LEFT VENTRICLE DIASTOLIC VOLUME INDEX (MOD BIPLANE): 36.7 ML/M2
LEFT VENTRICLE SYSTOLIC VOLUME (MOD BIPLANE): 31 ML
LEFT VENTRICLE SYSTOLIC VOLUME INDEX (MOD BIPLANE): 13.7 ML/M2
LEFT VENTRICULAR INTERNAL DIMENSION IN DIASTOLE: 5.2 CM (ref 3.5–6)
LEFT VENTRICULAR POSTERIOR WALL IN END DIASTOLE: 1.2 CM
LEFT VENTRICULAR STROKE VOLUME: 78 ML
LV EF BIPLANE MOD: 63 %
LV EF US.2D.A4C+ESTIMATED: 63 %
LVSV (TEICH): 78 ML
MV E'TISSUE VEL-LAT: 8 CM/S
MV E'TISSUE VEL-SEP: 7 CM/S
MV PEAK A VEL: 0.73 M/S
MV PEAK E VEL: 60 CM/S
MV STENOSIS PRESSURE HALF TIME: 75 MS
MV VALVE AREA P 1/2 METHOD: 2.93
RIGHT ATRIUM AREA SYSTOLE A4C: 12.1 CM2
RIGHT VENTRICLE ID DIMENSION: 4 CM
SL CV LEFT ATRIUM LENGTH A2C: 5.3 CM
SL CV LV EF: 55
SL CV PED ECHO LEFT VENTRICLE DIASTOLIC VOLUME (MOD BIPLANE) 2D: 129 ML
SL CV PED ECHO LEFT VENTRICLE SYSTOLIC VOLUME (MOD BIPLANE) 2D: 51 ML
TR MAX PG: 26 MMHG
TR PEAK VELOCITY: 2.6 M/S
TRICUSPID ANNULAR PLANE SYSTOLIC EXCURSION: 2.2 CM
TRICUSPID VALVE PEAK REGURGITATION VELOCITY: 2.55 M/S

## 2025-05-08 PROCEDURE — 93306 TTE W/DOPPLER COMPLETE: CPT

## 2025-05-08 PROCEDURE — 93306 TTE W/DOPPLER COMPLETE: CPT | Performed by: INTERNAL MEDICINE

## 2025-05-09 ENCOUNTER — HOSPITAL ENCOUNTER (OUTPATIENT)
Dept: GASTROENTEROLOGY | Facility: HOSPITAL | Age: 65
Setting detail: OUTPATIENT SURGERY
End: 2025-05-09
Attending: HOSPITALIST
Payer: COMMERCIAL

## 2025-05-09 ENCOUNTER — ANESTHESIA (OUTPATIENT)
Dept: GASTROENTEROLOGY | Facility: HOSPITAL | Age: 65
End: 2025-05-09
Payer: COMMERCIAL

## 2025-05-09 ENCOUNTER — ANESTHESIA EVENT (OUTPATIENT)
Dept: GASTROENTEROLOGY | Facility: HOSPITAL | Age: 65
End: 2025-05-09
Payer: COMMERCIAL

## 2025-05-09 VITALS
TEMPERATURE: 97.8 F | HEART RATE: 51 BPM | OXYGEN SATURATION: 95 % | BODY MASS INDEX: 34.5 KG/M2 | RESPIRATION RATE: 22 BRPM | DIASTOLIC BLOOD PRESSURE: 76 MMHG | WEIGHT: 241 LBS | SYSTOLIC BLOOD PRESSURE: 125 MMHG | HEIGHT: 70 IN

## 2025-05-09 DIAGNOSIS — K21.9 GASTRO-ESOPHAGEAL REFLUX DISEASE WITHOUT ESOPHAGITIS: ICD-10-CM

## 2025-05-09 DIAGNOSIS — R10.13 ABDOMINAL PAIN, EPIGASTRIC: ICD-10-CM

## 2025-05-09 LAB
GLUCOSE SERPL-MCNC: 123 MG/DL (ref 65–140)
GLUCOSE SERPL-MCNC: 130 MG/DL (ref 65–140)

## 2025-05-09 PROCEDURE — 82948 REAGENT STRIP/BLOOD GLUCOSE: CPT

## 2025-05-09 PROCEDURE — 88305 TISSUE EXAM BY PATHOLOGIST: CPT | Performed by: PATHOLOGY

## 2025-05-09 RX ORDER — METOPROLOL TARTRATE 1 MG/ML
INJECTION, SOLUTION INTRAVENOUS AS NEEDED
Status: DISCONTINUED | OUTPATIENT
Start: 2025-05-09 | End: 2025-05-09

## 2025-05-09 RX ORDER — LIDOCAINE HYDROCHLORIDE 20 MG/ML
INJECTION, SOLUTION EPIDURAL; INFILTRATION; INTRACAUDAL; PERINEURAL AS NEEDED
Status: DISCONTINUED | OUTPATIENT
Start: 2025-05-09 | End: 2025-05-09

## 2025-05-09 RX ORDER — SODIUM CHLORIDE, SODIUM LACTATE, POTASSIUM CHLORIDE, CALCIUM CHLORIDE 600; 310; 30; 20 MG/100ML; MG/100ML; MG/100ML; MG/100ML
INJECTION, SOLUTION INTRAVENOUS CONTINUOUS PRN
Status: DISCONTINUED | OUTPATIENT
Start: 2025-05-09 | End: 2025-05-09

## 2025-05-09 RX ORDER — PROPOFOL 10 MG/ML
INJECTION, EMULSION INTRAVENOUS AS NEEDED
Status: DISCONTINUED | OUTPATIENT
Start: 2025-05-09 | End: 2025-05-09

## 2025-05-09 RX ORDER — PANTOPRAZOLE SODIUM 40 MG/1
40 TABLET, DELAYED RELEASE ORAL DAILY
COMMUNITY
Start: 2025-05-06

## 2025-05-09 RX ADMIN — Medication 40 MG: at 07:44

## 2025-05-09 RX ADMIN — SODIUM CHLORIDE, SODIUM LACTATE, POTASSIUM CHLORIDE, AND CALCIUM CHLORIDE: .6; .31; .03; .02 INJECTION, SOLUTION INTRAVENOUS at 07:40

## 2025-05-09 RX ADMIN — METOROPROLOL TARTRATE 1 MG: 5 INJECTION, SOLUTION INTRAVENOUS at 07:43

## 2025-05-09 RX ADMIN — PROPOFOL 20 MG: 10 INJECTION, EMULSION INTRAVENOUS at 07:46

## 2025-05-09 RX ADMIN — PROPOFOL 20 MG: 10 INJECTION, EMULSION INTRAVENOUS at 07:45

## 2025-05-09 RX ADMIN — PROPOFOL 30 MG: 10 INJECTION, EMULSION INTRAVENOUS at 07:43

## 2025-05-09 RX ADMIN — PROPOFOL 120 MG: 10 INJECTION, EMULSION INTRAVENOUS at 07:40

## 2025-05-09 RX ADMIN — PROPOFOL 30 MG: 10 INJECTION, EMULSION INTRAVENOUS at 07:41

## 2025-05-09 RX ADMIN — LIDOCAINE HYDROCHLORIDE 100 MG: 20 INJECTION, SOLUTION EPIDURAL; INFILTRATION; INTRACAUDAL; PERINEURAL at 07:40

## 2025-05-09 NOTE — H&P
Problem: Potential for Falls  Goal: Patient will remain free of falls  Description: INTERVENTIONS:  - Assess patient frequently for physical needs  -  Identify cognitive and physical deficits and behaviors that affect risk of falls  -  [place] fall precautions as indicated by assessment   - Educate patient/family on patient safety including physical limitations  - Instruct patient to call for assistance with activity based on assessment  - Modify environment to reduce risk of injury  - Consider OT/PT consult to assist with strengthening/mobility  Outcome: Progressing     Problem: Prexisting or High Potential for Compromised Skin Integrity  Goal: Skin integrity is maintained or improved  Description: INTERVENTIONS:  - Identify patients at risk for skin breakdown  - Assess and monitor skin integrity  - Assess and monitor nutrition and hydration status  - Monitor labs   - Assess for incontinence   - Turn and reposition patient  - Assist with mobility/ambulation  - Relieve pressure over bony prominences  - Avoid friction and shearing  - Provide appropriate hygiene as needed including keeping skin clean and dry  - Evaluate need for skin moisturizer/barrier cream  - Collaborate with interdisciplinary team   - Patient/family teaching  - Consider wound care consult   Outcome: Progressing     Problem: SAFETY ADULT  Goal: Patient will remain free of falls  Description: INTERVENTIONS:  - Assess patient frequently for physical needs  -  Identify cognitive and physical deficits and behaviors that affect risk of falls    -  [place] fall precautions as indicated by assessment   - Educate patient/family on patient safety including physical limitations  - Instruct patient to call for assistance with activity based on assessment  - Modify environment to reduce risk of injury  - Consider OT/PT consult to assist with strengthening/mobility  Outcome: Progressing Procedure(s):    Esophagogastroduodenuoscopy with the indication(s) of  GERD symptoms and non cardiac chest pain    Vitals:    05/09/25 0638   BP: 139/66   Pulse: 57   Resp: 20   Temp: 97.5 °F (36.4 °C)   SpO2: 96%       Physical Exam:  Physical Exam  Vitals and nursing note reviewed.   Constitutional:       General: He is not in acute distress.     Appearance: He is well-developed.   HENT:      Head: Normocephalic and atraumatic.   Eyes:      Conjunctiva/sclera: Conjunctivae normal.   Cardiovascular:      Rate and Rhythm: Normal rate and regular rhythm.      Heart sounds: No murmur heard.  Pulmonary:      Effort: Pulmonary effort is normal. No respiratory distress.      Breath sounds: Normal breath sounds.   Abdominal:      Palpations: Abdomen is soft.      Tenderness: There is no abdominal tenderness.   Musculoskeletal:         General: No swelling.      Cervical back: Neck supple.   Skin:     General: Skin is warm and dry.      Capillary Refill: Capillary refill takes less than 2 seconds.   Neurological:      Mental Status: He is alert.   Psychiatric:         Mood and Affect: Mood normal.              Endoscopy Pre-Procedure Assessment:  Prior to the procedure, the patient is identified.  The patient's history, medications, and allergies have been reviewed.  The patient is competent.     Consent:    We have discussed the procedure in detail. We reviewed risks, benefits and alternative as well as potentional complications including and not limited to missed lesion or polyp,medication side effect , infection, bleeding, perforation and the potential need for surgery, ICU admission, CPR, as well as the need for blood product transfusion. Patient verbalized understanding and agreement. All patient questions were answered.     After reviewed the risks and benefits, the patient is deemed in satisfactory condition to undergo the procedure.  The anesthesia plan is to use monitored anesthesia care (MAC).      05/09/25

## 2025-05-09 NOTE — ANESTHESIA PREPROCEDURE EVALUATION
Procedure:  EGD    Relevant Problems   ANESTHESIA (within normal limits)      CARDIO   (+) Essential hypertension   (+) Type 2 diabetes mellitus with diabetic peripheral angiopathy and gangrene, without long-term current use of insulin (HCC)   (-) Angina at rest (HCC)   (-) Angina of effort (HCC)   (-) RANDOLPH (dyspnea on exertion)      ENDO   (+) Acquired hypothyroidism   (+) Type 2 diabetes mellitus with circulatory disorder, without long-term current use of insulin (HCC)   (+) Type 2 diabetes mellitus with diabetic peripheral angiopathy and gangrene, without long-term current use of insulin (HCC)      GI/HEPATIC   (+) GERD (gastroesophageal reflux disease)      /RENAL   (+) Benign hypertension with CKD (chronic kidney disease) stage III (HCC)   (+) Diabetic nephropathy associated with type 2 diabetes mellitus (HCC)   (+) Stage 4 chronic kidney disease (HCC)      HEMATOLOGY   (+) Chronic anemia      NEURO/PSYCH   (+) Diabetic peripheral neuropathy (HCC)      PULMONARY (within normal limits)   (-) URI (upper respiratory infection)      Other   (+) Osteomyelitis of foot, right, acute ([facility])      5/8/25 TTE  History    Chronic kidney disease,Htn,DM     Interpretation Summary         Left Ventricle: Left ventricular cavity size is normal. Wall thickness is normal. There is mild concentric remodeling. The left ventricular ejection fraction is 55% by visual estimation and 63% by biplane method. Systolic function is low normal. Wall motion is normal. Diastolic function is mildly abnormal, consistent with grade I (abnormal) relaxation.    Mitral Valve: There is mild regurgitation.    Tricuspid Valve: There is mild regurgitation.  RVSP is 30 mmHg.    Lab Results   Component Value Date    WBC 7.11 05/02/2025    HGB 11.3 (L) 05/02/2025    HCT 33.5 (L) 05/02/2025     (H) 05/02/2025     05/02/2025     Lab Results   Component Value Date    SODIUM 139 05/02/2025    K 4.7 05/02/2025     (H) 05/02/2025    CO2 23  05/02/2025    AGAP 6 05/02/2025    BUN 34 (H) 05/02/2025    CREATININE 2.58 (H) 05/02/2025    GLUC 145 (H) 05/02/2025    GLUF 132 (H) 01/11/2023    CALCIUM 9.0 05/02/2025    AST 21 05/02/2025    ALT 22 05/02/2025    ALKPHOS 58 05/02/2025    TP 7.1 05/02/2025    TBILI 0.44 05/02/2025    EGFR 24 05/02/2025     Encounter Date: 05/02/25   ECG 12 lead   Result Value    Ventricular Rate 59    Atrial Rate 59    MI Interval 152    QRSD Interval 92    QT Interval 416    QTC Interval 411    P Axis 11    QRS Axis 6    T Wave Axis 12    Narrative    Sinus bradycardia  Otherwise normal ECG  When compared with ECG of 01-Nov-2022 08:30,  No significant change was found  Confirmed by Arnaldo aG ([id number]) on 5/2/2025 2:40:00 PM         Physical Exam    Airway    Mallampati score: III  TM Distance: >3 FB  Neck ROM: full     Dental   Comment: Poor dentition     Cardiovascular  Rhythm: regular, Rate: normal, No murmur, No peripheral edema    Pulmonary   Breath sounds clear to auscultation    Other Findings        Anesthesia Plan  ASA Score- 3     Anesthesia Type- IV sedation with anesthesia with ASA Monitors.         Additional Monitors:     Airway Plan:            Plan Factors-Exercise tolerance (METS): >4 METS.    Chart reviewed. EKG reviewed. Imaging results reviewed. Existing labs reviewed. Patient summary reviewed.    Patient is not a current smoker.      Obstructive sleep apnea risk education given perioperatively.        Induction- intravenous.    Postoperative Plan-     Perioperative Resuscitation Plan - Level 1 - Full Code.       Informed Consent- Anesthetic plan and risks discussed with patient.  I personally reviewed this patient with the CRNA. Discussed and agreed on the Anesthesia Plan with the CRNA..      NPO Status:  Vitals Value Taken Time   Date of last liquid 05/08/25 05/09/25 0640   Time of last liquid 1900 05/09/25 0640   Date of last solid 05/08/25 05/09/25 0640   Time of last solid 1900 05/09/25 0640

## 2025-05-09 NOTE — ANESTHESIA POSTPROCEDURE EVALUATION
Post-Op Assessment Note    CV Status:  Stable    Pain management: adequate       Mental Status:  Sleepy   Hydration Status:  Euvolemic   PONV Controlled:  Controlled   Airway Patency:  Patent  Two or more mitigation strategies used for obstructive sleep apnea   Post Op Vitals Reviewed: Yes    No anethesia notable event occurred.    Staff: CRNA           Last Filed PACU Vitals:  Vitals Value Taken Time   Temp 97.5    Pulse 55 05/09/25 0752   BP 93/53    Resp 11 05/09/25 0752   SpO2 99 % 05/09/25 0752   Vitals shown include unfiled device data.

## 2025-05-13 PROCEDURE — 88305 TISSUE EXAM BY PATHOLOGIST: CPT | Performed by: PATHOLOGY

## 2025-05-23 ENCOUNTER — PROCEDURE VISIT (OUTPATIENT)
Dept: PODIATRY | Age: 65
End: 2025-05-23

## 2025-05-23 VITALS — WEIGHT: 238.6 LBS | HEIGHT: 70 IN | BODY MASS INDEX: 34.16 KG/M2

## 2025-05-23 DIAGNOSIS — E11.52 TYPE 2 DIABETES MELLITUS WITH DIABETIC PERIPHERAL ANGIOPATHY AND GANGRENE, WITHOUT LONG-TERM CURRENT USE OF INSULIN (HCC): ICD-10-CM

## 2025-05-23 DIAGNOSIS — L84 CALLUS: Primary | ICD-10-CM

## 2025-05-23 DIAGNOSIS — B35.1 ONYCHOMYCOSIS: ICD-10-CM

## 2025-05-23 RX ORDER — UREA 40 %
CREAM (GRAM) TOPICAL DAILY
Qty: 227 G | Refills: 3 | Status: SHIPPED | OUTPATIENT
Start: 2025-05-23

## 2025-05-23 NOTE — PROGRESS NOTES
Stalin SQUIRES Almonte  1960  AT RISK FOOT CARE    1. Callus  urea (CARMOL) 40 %      2. Onychomycosis        3. Type 2 diabetes mellitus with diabetic peripheral angiopathy and gangrene, without long-term current use of insulin (Formerly [facility])                  Patient presents for at-risk foot care.  Patient has not acute concerns. Patient has significant lower extremity risk due to diminished pulses in the feet and trophic skin changes to the lower extremity including thick toenail, atrophic skin, and decreased hair growth.     Wearing his new DM shoe/inserts which feel good. Using aquaphor to feet.     On exam patient has thickened, hypertrophic, discolored, brittle toenails with subungual debris and tenderness x8   Callus: 2 (R submet 2, L submet 1)  Amputation: 2 (right 2nd toe partial amputation and partial amputation 3rd ray right foot)    Today's treatment includes:  Debridement of toenails x8. Using nail nipper, ld, and curette, nails were sharply debrided, reduced in thickness and length. Devitalized nail tissue and fungal debris excised and removed. Patient tolerated well.    Paring of callus x2. Using a #15 blade the hyperkeratotic tissue below the 2nd metatarsal head right foot was pared in thickness to normal epithelium. C/w daily achilles stretching. I rx'd urea cream today for daily use instead of amlactin      Discussed proper shoe gear, daily inspections of feet, and general foot health with patient.   Patient has Q7 findings and is recommended for at risk foot care every 9-10 weeks.     Patients most recent complete clinical exam was performed 3/14/25

## (undated) DEVICE — NEEDLE 18 G X 1 1/2

## (undated) DEVICE — NEEDLE 25G X 1 1/2

## (undated) DEVICE — SUT ETHILON 3-0 PS-1 18 IN 1663H

## (undated) DEVICE — CURITY STRETCH BANDAGE: Brand: CURITY

## (undated) DEVICE — CURITY NON-ADHERENT STRIPS: Brand: CURITY

## (undated) DEVICE — OCCLUSIVE GAUZE STRIP,3% BISMUTH TRIBROMOPHENATE IN PETROLATUM BLEND: Brand: XEROFORM

## (undated) DEVICE — IODOFORM PACKING STRIP: Brand: CURITY

## (undated) DEVICE — STRETCH BANDAGE: Brand: CURITY

## (undated) DEVICE — 10FR FRAZIER SUCTION HANDLE: Brand: CARDINAL HEALTH

## (undated) DEVICE — NEPTUNE E-SEP SMOKE EVACUATION PENCIL, COATED, 70MM BLADE, PUSH BUTTON SWITCH: Brand: NEPTUNE E-SEP

## (undated) DEVICE — GAUZE SPONGES,16 PLY: Brand: CURITY

## (undated) DEVICE — PADDING CAST 6IN COTTON STRL

## (undated) DEVICE — CYSTO TUBING SINGLE IRRIGATION

## (undated) DEVICE — PREMIUM DRY TRAY LF: Brand: MEDLINE INDUSTRIES, INC.

## (undated) DEVICE — ZIMMER® STERILE DISPOSABLE TOURNIQUET CUFF, DUAL PORT, SINGLE BLADDER, 18 IN. (46 CM)

## (undated) DEVICE — SYRINGE 10ML LL

## (undated) DEVICE — CAST PADDING 6 IN SYNTHETIC STRL

## (undated) DEVICE — DRAPE C-ARM X-RAY

## (undated) DEVICE — VAC CANISTER 500ML

## (undated) DEVICE — INTENDED FOR TISSUE SEPARATION, AND OTHER PROCEDURES THAT REQUIRE A SHARP SURGICAL BLADE TO PUNCTURE OR CUT.: Brand: BARD-PARKER SAFETY BLADES SIZE 15, STERILE

## (undated) DEVICE — GLOVE SRG BIOGEL 7

## (undated) DEVICE — CULTURE TUBE ANAEROBIC

## (undated) DEVICE — JAMSHIDI BONE MARROW BIOPSY/ASPIRATION NEEDLE, WITH LUER-LOCK ADAPTER 11GX4 ASP: Brand: JAMSHIDI

## (undated) DEVICE — WET SKIN PREP TRAY: Brand: MEDLINE INDUSTRIES, INC.

## (undated) DEVICE — GLOVE SRG BIOGEL 6.5

## (undated) DEVICE — DRAPE SHEET THREE QUARTER

## (undated) DEVICE — CHLORAPREP HI-LITE 26ML ORANGE

## (undated) DEVICE — CULTURE TUBE AEROBIC

## (undated) DEVICE — PAD GROUNDING ADULT

## (undated) DEVICE — FRAZIER SUCTION INSTRUMENT 18 FR W/OBTURATOR, NO CONTROL VENT: Brand: FRAZIER

## (undated) DEVICE — ACE WRAP 4 IN STERILE

## (undated) DEVICE — COBAN 4 IN STERILE

## (undated) DEVICE — INTENDED FOR TISSUE SEPARATION, AND OTHER PROCEDURES THAT REQUIRE A SHARP SURGICAL BLADE TO PUNCTURE OR CUT.: Brand: BARD-PARKER ® CARBON RIB-BACK BLADES

## (undated) DEVICE — POV-IOD SOLUTION 4OZ BT

## (undated) DEVICE — KERLIX BANDAGE ROLL: Brand: KERLIX

## (undated) DEVICE — BETHLEHEM UNIVERSAL  MIONR EXT: Brand: CARDINAL HEALTH

## (undated) DEVICE — SCD SEQUENTIAL COMPRESSION COMFORT SLEEVE MEDIUM KNEE LENGTH: Brand: KENDALL SCD

## (undated) DEVICE — CRADLE EXTREMITY UNIVERSAL CONTOURED

## (undated) DEVICE — FLUFF UNDERPAD,MODERATE: Brand: WINGS

## (undated) DEVICE — GLOVE INDICATOR PI UNDERGLOVE SZ 7 BLUE

## (undated) DEVICE — HALF SHEET: Brand: CONVERTORS

## (undated) DEVICE — POSITION CUSHION INSERT LARGE PRONEVIEW

## (undated) DEVICE — ACE WRAP 4 IN UNSTERILE

## (undated) DEVICE — ACE WRAP 6 IN UNSTERILE

## (undated) DEVICE — IMPERVIOUS STOCKINETTE: Brand: DEROYAL

## (undated) DEVICE — BLADE SAGITTAL 25.6 X 9.5MM

## (undated) DEVICE — SINGLE PORT MANIFOLD: Brand: NEPTUNE 2